# Patient Record
Sex: FEMALE | Race: WHITE | Employment: OTHER | URBAN - METROPOLITAN AREA
[De-identification: names, ages, dates, MRNs, and addresses within clinical notes are randomized per-mention and may not be internally consistent; named-entity substitution may affect disease eponyms.]

---

## 2023-01-01 ENCOUNTER — APPOINTMENT (OUTPATIENT)
Dept: GENERAL RADIOLOGY | Age: 63
End: 2023-01-01
Payer: COMMERCIAL

## 2023-01-01 ENCOUNTER — HOSPITAL ENCOUNTER (INPATIENT)
Age: 63
LOS: 6 days | Discharge: STILL ADMITTED OR ADMINISTRATIVE | End: 2023-01-20
Attending: STUDENT IN AN ORGANIZED HEALTH CARE EDUCATION/TRAINING PROGRAM | Admitting: HOSPITALIST
Payer: COMMERCIAL

## 2023-01-01 ENCOUNTER — APPOINTMENT (OUTPATIENT)
Dept: CT IMAGING | Age: 63
End: 2023-01-01
Payer: COMMERCIAL

## 2023-01-01 ENCOUNTER — HOSPITAL ENCOUNTER (INPATIENT)
Age: 63
LOS: 1 days | DRG: 951 | End: 2023-01-20
Attending: INTERNAL MEDICINE | Admitting: INTERNAL MEDICINE
Payer: COMMERCIAL

## 2023-01-01 ENCOUNTER — APPOINTMENT (OUTPATIENT)
Dept: ULTRASOUND IMAGING | Age: 63
End: 2023-01-01
Payer: COMMERCIAL

## 2023-01-01 VITALS
TEMPERATURE: 98 F | HEART RATE: 144 BPM | BODY MASS INDEX: 41.41 KG/M2 | HEIGHT: 63 IN | OXYGEN SATURATION: 92 % | DIASTOLIC BLOOD PRESSURE: 26 MMHG | RESPIRATION RATE: 34 BRPM | WEIGHT: 233.69 LBS | SYSTOLIC BLOOD PRESSURE: 82 MMHG

## 2023-01-01 VITALS
OXYGEN SATURATION: 87 % | RESPIRATION RATE: 27 BRPM | DIASTOLIC BLOOD PRESSURE: 37 MMHG | SYSTOLIC BLOOD PRESSURE: 64 MMHG | HEART RATE: 109 BPM

## 2023-01-01 DIAGNOSIS — R11.2 NAUSEA AND VOMITING, UNSPECIFIED VOMITING TYPE: Primary | ICD-10-CM

## 2023-01-01 DIAGNOSIS — C56.9 MALIGNANT NEOPLASM OF OVARY, UNSPECIFIED LATERALITY (HCC): ICD-10-CM

## 2023-01-01 LAB
ALBUMIN SERPL-MCNC: 2.2 G/DL (ref 3.4–5)
ALBUMIN SERPL-MCNC: 2.6 G/DL (ref 3.4–5)
ALP BLD-CCNC: 111 U/L (ref 40–129)
ALT SERPL-CCNC: 9 U/L (ref 10–40)
ANION GAP SERPL CALCULATED.3IONS-SCNC: 12 MMOL/L (ref 3–16)
ANION GAP SERPL CALCULATED.3IONS-SCNC: 13 MMOL/L (ref 3–16)
ANION GAP SERPL CALCULATED.3IONS-SCNC: 15 MMOL/L (ref 3–16)
ANION GAP SERPL CALCULATED.3IONS-SCNC: 15 MMOL/L (ref 3–16)
ANION GAP SERPL CALCULATED.3IONS-SCNC: 16 MMOL/L (ref 3–16)
ANISOCYTOSIS: ABNORMAL
ANISOCYTOSIS: ABNORMAL
AST SERPL-CCNC: 15 U/L (ref 15–37)
ATYPICAL LYMPHOCYTE RELATIVE PERCENT: 1 % (ref 0–6)
BACTERIA: ABNORMAL /HPF
BANDED NEUTROPHILS RELATIVE PERCENT: 10 % (ref 0–7)
BANDED NEUTROPHILS RELATIVE PERCENT: 5 % (ref 0–7)
BANDED NEUTROPHILS RELATIVE PERCENT: 7 % (ref 0–7)
BASOPHILS ABSOLUTE: 0 K/UL (ref 0–0.2)
BASOPHILS ABSOLUTE: 0.1 K/UL (ref 0–0.2)
BASOPHILS ABSOLUTE: 0.1 K/UL (ref 0–0.2)
BASOPHILS RELATIVE PERCENT: 0 %
BASOPHILS RELATIVE PERCENT: 0.1 %
BASOPHILS RELATIVE PERCENT: 0.2 %
BASOPHILS RELATIVE PERCENT: 0.3 %
BASOPHILS RELATIVE PERCENT: 0.7 %
BILIRUB SERPL-MCNC: <0.2 MG/DL (ref 0–1)
BILIRUBIN DIRECT: <0.2 MG/DL (ref 0–0.3)
BILIRUBIN URINE: ABNORMAL
BILIRUBIN, INDIRECT: ABNORMAL MG/DL (ref 0–1)
BLOOD CULTURE, ROUTINE: NORMAL
BLOOD, URINE: ABNORMAL
BUN BLDV-MCNC: 44 MG/DL (ref 7–20)
BUN BLDV-MCNC: 45 MG/DL (ref 7–20)
BUN BLDV-MCNC: 55 MG/DL (ref 7–20)
BUN BLDV-MCNC: 61 MG/DL (ref 7–20)
BUN BLDV-MCNC: 62 MG/DL (ref 7–20)
BUN BLDV-MCNC: 65 MG/DL (ref 7–20)
BUN BLDV-MCNC: 73 MG/DL (ref 7–20)
BUN BLDV-MCNC: 81 MG/DL (ref 7–20)
CALCIUM SERPL-MCNC: 6.4 MG/DL (ref 8.3–10.6)
CALCIUM SERPL-MCNC: 6.9 MG/DL (ref 8.3–10.6)
CALCIUM SERPL-MCNC: 7.3 MG/DL (ref 8.3–10.6)
CALCIUM SERPL-MCNC: 8.4 MG/DL (ref 8.3–10.6)
CALCIUM SERPL-MCNC: 8.5 MG/DL (ref 8.3–10.6)
CALCIUM SERPL-MCNC: 8.7 MG/DL (ref 8.3–10.6)
CALCIUM SERPL-MCNC: 9.2 MG/DL (ref 8.3–10.6)
CALCIUM SERPL-MCNC: 9.6 MG/DL (ref 8.3–10.6)
CHLORIDE BLD-SCNC: 108 MMOL/L (ref 99–110)
CHLORIDE BLD-SCNC: 112 MMOL/L (ref 99–110)
CHLORIDE BLD-SCNC: 113 MMOL/L (ref 99–110)
CHLORIDE BLD-SCNC: 114 MMOL/L (ref 99–110)
CHLORIDE BLD-SCNC: 91 MMOL/L (ref 99–110)
CHLORIDE BLD-SCNC: 93 MMOL/L (ref 99–110)
CHLORIDE BLD-SCNC: 99 MMOL/L (ref 99–110)
CHLORIDE BLD-SCNC: 99 MMOL/L (ref 99–110)
CLARITY: CLEAR
CO2: 16 MMOL/L (ref 21–32)
CO2: 17 MMOL/L (ref 21–32)
CO2: 19 MMOL/L (ref 21–32)
CO2: 24 MMOL/L (ref 21–32)
CO2: 27 MMOL/L (ref 21–32)
CO2: 28 MMOL/L (ref 21–32)
CO2: 29 MMOL/L (ref 21–32)
CO2: 30 MMOL/L (ref 21–32)
COLOR: YELLOW
CREAT SERPL-MCNC: 1.3 MG/DL (ref 0.6–1.2)
CREAT SERPL-MCNC: 1.4 MG/DL (ref 0.6–1.2)
CREAT SERPL-MCNC: 1.6 MG/DL (ref 0.6–1.2)
CREAT SERPL-MCNC: 2.1 MG/DL (ref 0.6–1.2)
CREAT SERPL-MCNC: 2.2 MG/DL (ref 0.6–1.2)
CREAT SERPL-MCNC: 2.2 MG/DL (ref 0.6–1.2)
CREATININE URINE: 157.2 MG/DL (ref 28–259)
CULTURE, BLOOD 2: NORMAL
EKG ATRIAL RATE: 105 BPM
EKG ATRIAL RATE: 110 BPM
EKG ATRIAL RATE: 116 BPM
EKG ATRIAL RATE: 138 BPM
EKG DIAGNOSIS: NORMAL
EKG P AXIS: 12 DEGREES
EKG P AXIS: 2 DEGREES
EKG P-R INTERVAL: 124 MS
EKG P-R INTERVAL: 126 MS
EKG Q-T INTERVAL: 284 MS
EKG Q-T INTERVAL: 288 MS
EKG Q-T INTERVAL: 334 MS
EKG Q-T INTERVAL: 362 MS
EKG QRS DURATION: 68 MS
EKG QRS DURATION: 74 MS
EKG QRS DURATION: 74 MS
EKG QRS DURATION: 84 MS
EKG QTC CALCULATION (BAZETT): 442 MS
EKG QTC CALCULATION (BAZETT): 443 MS
EKG QTC CALCULATION (BAZETT): 464 MS
EKG QTC CALCULATION (BAZETT): 478 MS
EKG R AXIS: -36 DEGREES
EKG R AXIS: -46 DEGREES
EKG R AXIS: -51 DEGREES
EKG R AXIS: 135 DEGREES
EKG T AXIS: -10 DEGREES
EKG T AXIS: 11 DEGREES
EKG T AXIS: 36 DEGREES
EKG T AXIS: 54 DEGREES
EKG VENTRICULAR RATE: 105 BPM
EKG VENTRICULAR RATE: 116 BPM
EKG VENTRICULAR RATE: 142 BPM
EKG VENTRICULAR RATE: 146 BPM
EOSINOPHILS ABSOLUTE: 0 K/UL (ref 0–0.6)
EOSINOPHILS ABSOLUTE: 0.1 K/UL (ref 0–0.6)
EOSINOPHILS ABSOLUTE: 0.2 K/UL (ref 0–0.6)
EOSINOPHILS RELATIVE PERCENT: 0 %
EOSINOPHILS RELATIVE PERCENT: 0.5 %
EOSINOPHILS RELATIVE PERCENT: 0.7 %
EOSINOPHILS RELATIVE PERCENT: 1 %
EOSINOPHILS RELATIVE PERCENT: 1.1 %
EPITHELIAL CELLS, UA: ABNORMAL /HPF (ref 0–5)
GFR SERPL CREATININE-BSD FRML MDRD: 24 ML/MIN/{1.73_M2}
GFR SERPL CREATININE-BSD FRML MDRD: 24 ML/MIN/{1.73_M2}
GFR SERPL CREATININE-BSD FRML MDRD: 26 ML/MIN/{1.73_M2}
GFR SERPL CREATININE-BSD FRML MDRD: 36 ML/MIN/{1.73_M2}
GFR SERPL CREATININE-BSD FRML MDRD: 42 ML/MIN/{1.73_M2}
GFR SERPL CREATININE-BSD FRML MDRD: 46 ML/MIN/{1.73_M2}
GLUCOSE BLD-MCNC: 100 MG/DL (ref 70–99)
GLUCOSE BLD-MCNC: 101 MG/DL (ref 70–99)
GLUCOSE BLD-MCNC: 104 MG/DL (ref 70–99)
GLUCOSE BLD-MCNC: 104 MG/DL (ref 70–99)
GLUCOSE BLD-MCNC: 111 MG/DL (ref 70–99)
GLUCOSE BLD-MCNC: 111 MG/DL (ref 70–99)
GLUCOSE BLD-MCNC: 112 MG/DL (ref 70–99)
GLUCOSE BLD-MCNC: 113 MG/DL (ref 70–99)
GLUCOSE BLD-MCNC: 122 MG/DL (ref 70–99)
GLUCOSE BLD-MCNC: 126 MG/DL (ref 70–99)
GLUCOSE BLD-MCNC: 127 MG/DL (ref 70–99)
GLUCOSE BLD-MCNC: 129 MG/DL (ref 70–99)
GLUCOSE BLD-MCNC: 142 MG/DL (ref 70–99)
GLUCOSE BLD-MCNC: 157 MG/DL (ref 70–99)
GLUCOSE BLD-MCNC: 212 MG/DL (ref 70–99)
GLUCOSE BLD-MCNC: 92 MG/DL (ref 70–99)
GLUCOSE BLD-MCNC: 93 MG/DL (ref 70–99)
GLUCOSE URINE: NEGATIVE MG/DL
HCT VFR BLD CALC: 35.4 % (ref 36–48)
HCT VFR BLD CALC: 35.7 % (ref 36–48)
HCT VFR BLD CALC: 36.1 % (ref 36–48)
HCT VFR BLD CALC: 36.6 % (ref 36–48)
HCT VFR BLD CALC: 38 % (ref 36–48)
HCT VFR BLD CALC: 38.2 % (ref 36–48)
HCT VFR BLD CALC: 40.8 % (ref 36–48)
HEMOGLOBIN: 10.2 G/DL (ref 12–16)
HEMOGLOBIN: 10.6 G/DL (ref 12–16)
HEMOGLOBIN: 10.7 G/DL (ref 12–16)
HEMOGLOBIN: 10.9 G/DL (ref 12–16)
HEMOGLOBIN: 11 G/DL (ref 12–16)
HEMOGLOBIN: 11.1 G/DL (ref 12–16)
HEMOGLOBIN: 12 G/DL (ref 12–16)
HEMOGLOBIN: 9.9 G/DL (ref 12–16)
HYALINE CASTS: ABNORMAL /LPF (ref 0–2)
INR BLD: 1.2 (ref 0.87–1.14)
KETONES, URINE: ABNORMAL MG/DL
LACTATE DEHYDROGENASE: 334 U/L (ref 100–190)
LACTIC ACID: 1.2 MMOL/L (ref 0.4–2)
LEUKOCYTE ESTERASE, URINE: ABNORMAL
LV EF: 58 %
LVEF MODALITY: NORMAL
LYMPHOCYTES ABSOLUTE: 0.6 K/UL (ref 1–5.1)
LYMPHOCYTES ABSOLUTE: 1 K/UL (ref 1–5.1)
LYMPHOCYTES ABSOLUTE: 1.1 K/UL (ref 1–5.1)
LYMPHOCYTES ABSOLUTE: 1.1 K/UL (ref 1–5.1)
LYMPHOCYTES ABSOLUTE: 1.2 K/UL (ref 1–5.1)
LYMPHOCYTES ABSOLUTE: 1.2 K/UL (ref 1–5.1)
LYMPHOCYTES ABSOLUTE: 2.1 K/UL (ref 1–5.1)
LYMPHOCYTES RELATIVE PERCENT: 12 %
LYMPHOCYTES RELATIVE PERCENT: 2.8 %
LYMPHOCYTES RELATIVE PERCENT: 4 %
LYMPHOCYTES RELATIVE PERCENT: 5 %
LYMPHOCYTES RELATIVE PERCENT: 5.5 %
LYMPHOCYTES RELATIVE PERCENT: 7.3 %
LYMPHOCYTES RELATIVE PERCENT: 9.1 %
MACROCYTES: ABNORMAL
MAGNESIUM: 1.9 MG/DL (ref 1.8–2.4)
MAGNESIUM: 2.2 MG/DL (ref 1.8–2.4)
MAGNESIUM: 2.2 MG/DL (ref 1.8–2.4)
MCH RBC QN AUTO: 20.8 PG (ref 26–34)
MCH RBC QN AUTO: 21.2 PG (ref 26–34)
MCH RBC QN AUTO: 21.2 PG (ref 26–34)
MCH RBC QN AUTO: 21.4 PG (ref 26–34)
MCH RBC QN AUTO: 21.5 PG (ref 26–34)
MCH RBC QN AUTO: 21.7 PG (ref 26–34)
MCH RBC QN AUTO: 21.8 PG (ref 26–34)
MCHC RBC AUTO-ENTMCNC: 28.1 G/DL (ref 31–36)
MCHC RBC AUTO-ENTMCNC: 28.6 G/DL (ref 31–36)
MCHC RBC AUTO-ENTMCNC: 28.9 G/DL (ref 31–36)
MCHC RBC AUTO-ENTMCNC: 29.1 G/DL (ref 31–36)
MCHC RBC AUTO-ENTMCNC: 29.5 G/DL (ref 31–36)
MCHC RBC AUTO-ENTMCNC: 29.8 G/DL (ref 31–36)
MCHC RBC AUTO-ENTMCNC: 29.8 G/DL (ref 31–36)
MCV RBC AUTO: 71.2 FL (ref 80–100)
MCV RBC AUTO: 71.8 FL (ref 80–100)
MCV RBC AUTO: 72.2 FL (ref 80–100)
MCV RBC AUTO: 72.8 FL (ref 80–100)
MCV RBC AUTO: 73.5 FL (ref 80–100)
MCV RBC AUTO: 76.3 FL (ref 80–100)
MCV RBC AUTO: 76.7 FL (ref 80–100)
METAMYELOCYTES RELATIVE PERCENT: 1 %
METAMYELOCYTES RELATIVE PERCENT: 3 %
MICROCYTES: ABNORMAL
MICROSCOPIC EXAMINATION: YES
MONOCYTES ABSOLUTE: 0.5 K/UL (ref 0–1.3)
MONOCYTES ABSOLUTE: 0.5 K/UL (ref 0–1.3)
MONOCYTES ABSOLUTE: 0.7 K/UL (ref 0–1.3)
MONOCYTES ABSOLUTE: 1.1 K/UL (ref 0–1.3)
MONOCYTES ABSOLUTE: 2.7 K/UL (ref 0–1.3)
MONOCYTES RELATIVE PERCENT: 14 %
MONOCYTES RELATIVE PERCENT: 2 %
MONOCYTES RELATIVE PERCENT: 2.4 %
MONOCYTES RELATIVE PERCENT: 4 %
MONOCYTES RELATIVE PERCENT: 5.8 %
MONOCYTES RELATIVE PERCENT: 6.5 %
MONOCYTES RELATIVE PERCENT: 8.2 %
MYELOCYTE PERCENT: 1 %
MYELOCYTE PERCENT: 2 %
NEUTROPHILS ABSOLUTE: 11.1 K/UL (ref 1.7–7.7)
NEUTROPHILS ABSOLUTE: 13.8 K/UL (ref 1.7–7.7)
NEUTROPHILS ABSOLUTE: 14.9 K/UL (ref 1.7–7.7)
NEUTROPHILS ABSOLUTE: 15 K/UL (ref 1.7–7.7)
NEUTROPHILS ABSOLUTE: 17.2 K/UL (ref 1.7–7.7)
NEUTROPHILS ABSOLUTE: 21.7 K/UL (ref 1.7–7.7)
NEUTROPHILS ABSOLUTE: 23.9 K/UL (ref 1.7–7.7)
NEUTROPHILS RELATIVE PERCENT: 70 %
NEUTROPHILS RELATIVE PERCENT: 74 %
NEUTROPHILS RELATIVE PERCENT: 81.5 %
NEUTROPHILS RELATIVE PERCENT: 84 %
NEUTROPHILS RELATIVE PERCENT: 85.2 %
NEUTROPHILS RELATIVE PERCENT: 87.5 %
NEUTROPHILS RELATIVE PERCENT: 94.6 %
NITRITE, URINE: NEGATIVE
OVALOCYTES: ABNORMAL
OVALOCYTES: ABNORMAL
PDW BLD-RTO: 28.8 % (ref 12.4–15.4)
PDW BLD-RTO: 29.3 % (ref 12.4–15.4)
PDW BLD-RTO: 30.3 % (ref 12.4–15.4)
PDW BLD-RTO: 30.3 % (ref 12.4–15.4)
PDW BLD-RTO: 30.4 % (ref 12.4–15.4)
PDW BLD-RTO: 30.6 % (ref 12.4–15.4)
PDW BLD-RTO: 31.3 % (ref 12.4–15.4)
PERFORMED ON: ABNORMAL
PERFORMED ON: NORMAL
PH UA: 5.5 (ref 5–8)
PHOSPHORUS: 3.6 MG/DL (ref 2.5–4.9)
PHOSPHORUS: 4 MG/DL (ref 2.5–4.9)
PHOSPHORUS: 4.5 MG/DL (ref 2.5–4.9)
PHOSPHORUS: 4.8 MG/DL (ref 2.5–4.9)
PLATELET # BLD: 448 K/UL (ref 135–450)
PLATELET # BLD: 452 K/UL (ref 135–450)
PLATELET # BLD: 638 K/UL (ref 135–450)
PLATELET # BLD: 666 K/UL (ref 135–450)
PLATELET # BLD: 704 K/UL (ref 135–450)
PLATELET # BLD: 739 K/UL (ref 135–450)
PLATELET # BLD: 919 K/UL (ref 135–450)
PLATELET SLIDE REVIEW: ABNORMAL
PMV BLD AUTO: 7.3 FL (ref 5–10.5)
PMV BLD AUTO: 7.6 FL (ref 5–10.5)
PMV BLD AUTO: 7.9 FL (ref 5–10.5)
PMV BLD AUTO: 8 FL (ref 5–10.5)
PMV BLD AUTO: 8.3 FL (ref 5–10.5)
POLYCHROMASIA: ABNORMAL
POTASSIUM REFLEX MAGNESIUM: 4.7 MMOL/L (ref 3.5–5.1)
POTASSIUM REFLEX MAGNESIUM: 4.8 MMOL/L (ref 3.5–5.1)
POTASSIUM SERPL-SCNC: 4.1 MMOL/L (ref 3.5–5.1)
POTASSIUM SERPL-SCNC: 4.3 MMOL/L (ref 3.5–5.1)
POTASSIUM SERPL-SCNC: 4.5 MMOL/L (ref 3.5–5.1)
POTASSIUM SERPL-SCNC: 4.6 MMOL/L (ref 3.5–5.1)
POTASSIUM SERPL-SCNC: 4.8 MMOL/L (ref 3.5–5.1)
POTASSIUM SERPL-SCNC: 6.1 MMOL/L (ref 3.5–5.1)
PRO-BNP: 238 PG/ML (ref 0–124)
PROCALCITONIN: 1.1 NG/ML (ref 0–0.15)
PROTEIN UA: 30 MG/DL
PROTHROMBIN TIME: 15.2 SEC (ref 11.7–14.5)
RBC # BLD: 4.61 M/UL (ref 4–5.2)
RBC # BLD: 4.68 M/UL (ref 4–5.2)
RBC # BLD: 4.96 M/UL (ref 4–5.2)
RBC # BLD: 5.14 M/UL (ref 4–5.2)
RBC # BLD: 5.2 M/UL (ref 4–5.2)
RBC # BLD: 5.27 M/UL (ref 4–5.2)
RBC # BLD: 5.68 M/UL (ref 4–5.2)
RBC UA: ABNORMAL /HPF (ref 0–4)
SARS-COV-2, NAAT: NOT DETECTED
SCHISTOCYTES: ABNORMAL
SLIDE REVIEW: ABNORMAL
SODIUM BLD-SCNC: 135 MMOL/L (ref 136–145)
SODIUM BLD-SCNC: 138 MMOL/L (ref 136–145)
SODIUM BLD-SCNC: 139 MMOL/L (ref 136–145)
SODIUM BLD-SCNC: 140 MMOL/L (ref 136–145)
SODIUM BLD-SCNC: 144 MMOL/L (ref 136–145)
SODIUM BLD-SCNC: 145 MMOL/L (ref 136–145)
SODIUM URINE: <20 MMOL/L
SOLUBLE TRANSFERRIN RECEPT: 3.9 MG/L (ref 1.9–4.4)
SPECIFIC GRAVITY UA: >=1.03 (ref 1–1.03)
STOMATOCYTES: ABNORMAL
TOTAL PROTEIN: 5.8 G/DL (ref 6.4–8.2)
TRIGL SERPL-MCNC: 294 MG/DL (ref 0–150)
TROPONIN: <0.01 NG/ML
URINE REFLEX TO CULTURE: ABNORMAL
URINE TYPE: ABNORMAL
UROBILINOGEN, URINE: 0.2 E.U./DL
WBC # BLD: 13.6 K/UL (ref 4–11)
WBC # BLD: 16.2 K/UL (ref 4–11)
WBC # BLD: 17.7 K/UL (ref 4–11)
WBC # BLD: 19 K/UL (ref 4–11)
WBC # BLD: 19.7 K/UL (ref 4–11)
WBC # BLD: 22.9 K/UL (ref 4–11)
WBC # BLD: 25.4 K/UL (ref 4–11)
WBC UA: ABNORMAL /HPF (ref 0–5)

## 2023-01-01 PROCEDURE — 2580000003 HC RX 258: Performed by: HOSPITALIST

## 2023-01-01 PROCEDURE — 85025 COMPLETE CBC W/AUTO DIFF WBC: CPT

## 2023-01-01 PROCEDURE — 84300 ASSAY OF URINE SODIUM: CPT

## 2023-01-01 PROCEDURE — 6360000002 HC RX W HCPCS: Performed by: NURSE PRACTITIONER

## 2023-01-01 PROCEDURE — 2580000003 HC RX 258: Performed by: STUDENT IN AN ORGANIZED HEALTH CARE EDUCATION/TRAINING PROGRAM

## 2023-01-01 PROCEDURE — 99223 1ST HOSP IP/OBS HIGH 75: CPT | Performed by: SURGERY

## 2023-01-01 PROCEDURE — 71045 X-RAY EXAM CHEST 1 VIEW: CPT

## 2023-01-01 PROCEDURE — 93010 ELECTROCARDIOGRAM REPORT: CPT | Performed by: INTERNAL MEDICINE

## 2023-01-01 PROCEDURE — 93005 ELECTROCARDIOGRAM TRACING: CPT | Performed by: INTERNAL MEDICINE

## 2023-01-01 PROCEDURE — 6360000002 HC RX W HCPCS: Performed by: HOSPITALIST

## 2023-01-01 PROCEDURE — 84484 ASSAY OF TROPONIN QUANT: CPT

## 2023-01-01 PROCEDURE — 80048 BASIC METABOLIC PNL TOTAL CA: CPT

## 2023-01-01 PROCEDURE — 83615 LACTATE (LD) (LDH) ENZYME: CPT

## 2023-01-01 PROCEDURE — 2580000003 HC RX 258

## 2023-01-01 PROCEDURE — 2500000003 HC RX 250 WO HCPCS: Performed by: INTERNAL MEDICINE

## 2023-01-01 PROCEDURE — 2580000003 HC RX 258: Performed by: INTERNAL MEDICINE

## 2023-01-01 PROCEDURE — 6360000002 HC RX W HCPCS: Performed by: INTERNAL MEDICINE

## 2023-01-01 PROCEDURE — 99223 1ST HOSP IP/OBS HIGH 75: CPT | Performed by: INTERNAL MEDICINE

## 2023-01-01 PROCEDURE — 83605 ASSAY OF LACTIC ACID: CPT

## 2023-01-01 PROCEDURE — 36415 COLL VENOUS BLD VENIPUNCTURE: CPT

## 2023-01-01 PROCEDURE — 84478 ASSAY OF TRIGLYCERIDES: CPT

## 2023-01-01 PROCEDURE — 84100 ASSAY OF PHOSPHORUS: CPT

## 2023-01-01 PROCEDURE — 2060000000 HC ICU INTERMEDIATE R&B

## 2023-01-01 PROCEDURE — 6370000000 HC RX 637 (ALT 250 FOR IP): Performed by: INTERNAL MEDICINE

## 2023-01-01 PROCEDURE — 85018 HEMOGLOBIN: CPT

## 2023-01-01 PROCEDURE — 2000000000 HC ICU R&B

## 2023-01-01 PROCEDURE — 85610 PROTHROMBIN TIME: CPT

## 2023-01-01 PROCEDURE — 74018 RADEX ABDOMEN 1 VIEW: CPT

## 2023-01-01 PROCEDURE — C9113 INJ PANTOPRAZOLE SODIUM, VIA: HCPCS | Performed by: NURSE PRACTITIONER

## 2023-01-01 PROCEDURE — 6370000000 HC RX 637 (ALT 250 FOR IP): Performed by: HOSPITALIST

## 2023-01-01 PROCEDURE — 2709999900 US GUIDED PARACENTESIS

## 2023-01-01 PROCEDURE — 96415 CHEMO IV INFUSION ADDL HR: CPT

## 2023-01-01 PROCEDURE — 74176 CT ABD & PELVIS W/O CONTRAST: CPT

## 2023-01-01 PROCEDURE — 51798 US URINE CAPACITY MEASURE: CPT

## 2023-01-01 PROCEDURE — 0W9G3ZZ DRAINAGE OF PERITONEAL CAVITY, PERCUTANEOUS APPROACH: ICD-10-PCS | Performed by: RADIOLOGY

## 2023-01-01 PROCEDURE — 6370000000 HC RX 637 (ALT 250 FOR IP): Performed by: STUDENT IN AN ORGANIZED HEALTH CARE EDUCATION/TRAINING PROGRAM

## 2023-01-01 PROCEDURE — 2500000003 HC RX 250 WO HCPCS: Performed by: STUDENT IN AN ORGANIZED HEALTH CARE EDUCATION/TRAINING PROGRAM

## 2023-01-01 PROCEDURE — 96374 THER/PROPH/DIAG INJ IV PUSH: CPT

## 2023-01-01 PROCEDURE — 96417 CHEMO IV INFUS EACH ADDL SEQ: CPT

## 2023-01-01 PROCEDURE — 6360000002 HC RX W HCPCS: Performed by: STUDENT IN AN ORGANIZED HEALTH CARE EDUCATION/TRAINING PROGRAM

## 2023-01-01 PROCEDURE — 83735 ASSAY OF MAGNESIUM: CPT

## 2023-01-01 PROCEDURE — C9113 INJ PANTOPRAZOLE SODIUM, VIA: HCPCS | Performed by: INTERNAL MEDICINE

## 2023-01-01 PROCEDURE — 84238 ASSAY NONENDOCRINE RECEPTOR: CPT

## 2023-01-01 PROCEDURE — 51702 INSERT TEMP BLADDER CATH: CPT

## 2023-01-01 PROCEDURE — 96375 TX/PRO/DX INJ NEW DRUG ADDON: CPT

## 2023-01-01 PROCEDURE — 87635 SARS-COV-2 COVID-19 AMP PRB: CPT

## 2023-01-01 PROCEDURE — 99233 SBSQ HOSP IP/OBS HIGH 50: CPT | Performed by: NURSE PRACTITIONER

## 2023-01-01 PROCEDURE — 99232 SBSQ HOSP IP/OBS MODERATE 35: CPT | Performed by: SURGERY

## 2023-01-01 PROCEDURE — 6360000004 HC RX CONTRAST MEDICATION: Performed by: HOSPITALIST

## 2023-01-01 PROCEDURE — 93306 TTE W/DOPPLER COMPLETE: CPT

## 2023-01-01 PROCEDURE — 99222 1ST HOSP IP/OBS MODERATE 55: CPT | Performed by: NURSE PRACTITIONER

## 2023-01-01 PROCEDURE — 1250000000 HC SEMI PRIVATE HOSPICE R&B

## 2023-01-01 PROCEDURE — 83880 ASSAY OF NATRIURETIC PEPTIDE: CPT

## 2023-01-01 PROCEDURE — 93005 ELECTROCARDIOGRAM TRACING: CPT

## 2023-01-01 PROCEDURE — 93005 ELECTROCARDIOGRAM TRACING: CPT | Performed by: STUDENT IN AN ORGANIZED HEALTH CARE EDUCATION/TRAINING PROGRAM

## 2023-01-01 PROCEDURE — 82570 ASSAY OF URINE CREATININE: CPT

## 2023-01-01 PROCEDURE — 87040 BLOOD CULTURE FOR BACTERIA: CPT

## 2023-01-01 PROCEDURE — 80069 RENAL FUNCTION PANEL: CPT

## 2023-01-01 PROCEDURE — 96361 HYDRATE IV INFUSION ADD-ON: CPT

## 2023-01-01 PROCEDURE — 6360000002 HC RX W HCPCS

## 2023-01-01 PROCEDURE — 84145 PROCALCITONIN (PCT): CPT

## 2023-01-01 PROCEDURE — 81001 URINALYSIS AUTO W/SCOPE: CPT

## 2023-01-01 PROCEDURE — C9113 INJ PANTOPRAZOLE SODIUM, VIA: HCPCS | Performed by: STUDENT IN AN ORGANIZED HEALTH CARE EDUCATION/TRAINING PROGRAM

## 2023-01-01 PROCEDURE — 99233 SBSQ HOSP IP/OBS HIGH 50: CPT | Performed by: INTERNAL MEDICINE

## 2023-01-01 PROCEDURE — 2580000003 HC RX 258: Performed by: NURSE PRACTITIONER

## 2023-01-01 PROCEDURE — 99285 EMERGENCY DEPT VISIT HI MDM: CPT

## 2023-01-01 PROCEDURE — 80076 HEPATIC FUNCTION PANEL: CPT

## 2023-01-01 RX ORDER — LORAZEPAM 2 MG/ML
0.5 INJECTION INTRAMUSCULAR EVERY 4 HOURS PRN
Status: DISCONTINUED | OUTPATIENT
Start: 2023-01-01 | End: 2023-01-01 | Stop reason: HOSPADM

## 2023-01-01 RX ORDER — PROMETHAZINE HYDROCHLORIDE 25 MG/ML
12.5 INJECTION, SOLUTION INTRAMUSCULAR; INTRAVENOUS EVERY 6 HOURS PRN
Status: DISCONTINUED | OUTPATIENT
Start: 2023-01-01 | End: 2023-01-01

## 2023-01-01 RX ORDER — SODIUM CHLORIDE, SODIUM LACTATE, POTASSIUM CHLORIDE, CALCIUM CHLORIDE 600; 310; 30; 20 MG/100ML; MG/100ML; MG/100ML; MG/100ML
INJECTION, SOLUTION INTRAVENOUS CONTINUOUS
Status: ACTIVE | OUTPATIENT
Start: 2023-01-01 | End: 2023-01-01

## 2023-01-01 RX ORDER — ONDANSETRON 4 MG/1
4 TABLET, ORALLY DISINTEGRATING ORAL EVERY 8 HOURS PRN
Status: ACTIVE | OUTPATIENT
Start: 2023-01-01 | End: 2023-01-01

## 2023-01-01 RX ORDER — THIAMINE HYDROCHLORIDE 100 MG/ML
100 INJECTION, SOLUTION INTRAMUSCULAR; INTRAVENOUS DAILY
Status: DISCONTINUED | OUTPATIENT
Start: 2023-01-01 | End: 2023-01-01 | Stop reason: HOSPADM

## 2023-01-01 RX ORDER — SODIUM CHLORIDE 0.9 % (FLUSH) 0.9 %
5-40 SYRINGE (ML) INJECTION EVERY 12 HOURS SCHEDULED
Status: DISCONTINUED | OUTPATIENT
Start: 2023-01-01 | End: 2023-01-21 | Stop reason: HOSPADM

## 2023-01-01 RX ORDER — SODIUM CHLORIDE, SODIUM LACTATE, POTASSIUM CHLORIDE, AND CALCIUM CHLORIDE .6; .31; .03; .02 G/100ML; G/100ML; G/100ML; G/100ML
1000 INJECTION, SOLUTION INTRAVENOUS ONCE
Status: COMPLETED | OUTPATIENT
Start: 2023-01-01 | End: 2023-01-01

## 2023-01-01 RX ORDER — POLYETHYLENE GLYCOL 3350 17 G/17G
17 POWDER, FOR SOLUTION ORAL DAILY
Status: DISCONTINUED | OUTPATIENT
Start: 2023-01-01 | End: 2023-01-01 | Stop reason: HOSPADM

## 2023-01-01 RX ORDER — 0.9 % SODIUM CHLORIDE 0.9 %
1000 INTRAVENOUS SOLUTION INTRAVENOUS ONCE
Status: COMPLETED | OUTPATIENT
Start: 2023-01-01 | End: 2023-01-01

## 2023-01-01 RX ORDER — ACETAMINOPHEN 325 MG/1
650 TABLET ORAL EVERY 4 HOURS PRN
OUTPATIENT
Start: 2023-01-01

## 2023-01-01 RX ORDER — CALCIUM CARBONATE 200(500)MG
500 TABLET,CHEWABLE ORAL ONCE
Status: DISCONTINUED | OUTPATIENT
Start: 2023-01-01 | End: 2023-01-01 | Stop reason: HOSPADM

## 2023-01-01 RX ORDER — MORPHINE SULFATE 2 MG/ML
2 INJECTION, SOLUTION INTRAMUSCULAR; INTRAVENOUS EVERY 4 HOURS PRN
Status: DISCONTINUED | OUTPATIENT
Start: 2023-01-01 | End: 2023-01-01

## 2023-01-01 RX ORDER — ACETAMINOPHEN 325 MG/1
650 TABLET ORAL EVERY 4 HOURS PRN
Status: DISCONTINUED | OUTPATIENT
Start: 2023-01-01 | End: 2023-01-21 | Stop reason: HOSPADM

## 2023-01-01 RX ORDER — LORAZEPAM 2 MG/ML
0.5 INJECTION INTRAMUSCULAR EVERY 4 HOURS PRN
OUTPATIENT
Start: 2023-01-01

## 2023-01-01 RX ORDER — MORPHINE SULFATE 20 MG/ML
7.5 SOLUTION ORAL EVERY 4 HOURS PRN
Status: DISCONTINUED | OUTPATIENT
Start: 2023-01-01 | End: 2023-01-21 | Stop reason: HOSPADM

## 2023-01-01 RX ORDER — ONDANSETRON 2 MG/ML
4 INJECTION INTRAMUSCULAR; INTRAVENOUS EVERY 6 HOURS PRN
Status: DISCONTINUED | OUTPATIENT
Start: 2023-01-01 | End: 2023-01-01

## 2023-01-01 RX ORDER — ONDANSETRON 2 MG/ML
4 INJECTION INTRAMUSCULAR; INTRAVENOUS ONCE
Status: COMPLETED | OUTPATIENT
Start: 2023-01-01 | End: 2023-01-01

## 2023-01-01 RX ORDER — MORPHINE SULFATE 20 MG/ML
15 SOLUTION ORAL EVERY 4 HOURS PRN
Status: DISCONTINUED | OUTPATIENT
Start: 2023-01-01 | End: 2023-01-21 | Stop reason: HOSPADM

## 2023-01-01 RX ORDER — SODIUM CHLORIDE 9 MG/ML
INJECTION, SOLUTION INTRAVENOUS PRN
Status: DISCONTINUED | OUTPATIENT
Start: 2023-01-01 | End: 2023-01-01 | Stop reason: HOSPADM

## 2023-01-01 RX ORDER — PROCHLORPERAZINE EDISYLATE 5 MG/ML
10 INJECTION INTRAMUSCULAR; INTRAVENOUS EVERY 6 HOURS PRN
Status: DISCONTINUED | OUTPATIENT
Start: 2023-01-01 | End: 2023-01-01 | Stop reason: HOSPADM

## 2023-01-01 RX ORDER — ONDANSETRON 4 MG/1
4 TABLET, FILM COATED ORAL EVERY 8 HOURS PRN
Status: DISCONTINUED | OUTPATIENT
Start: 2023-01-01 | End: 2023-01-01

## 2023-01-01 RX ORDER — SODIUM CHLORIDE 9 MG/ML
INJECTION, SOLUTION INTRAVENOUS CONTINUOUS
Status: DISCONTINUED | OUTPATIENT
Start: 2023-01-01 | End: 2023-01-01

## 2023-01-01 RX ORDER — METRONIDAZOLE 500 MG/100ML
500 INJECTION, SOLUTION INTRAVENOUS EVERY 8 HOURS
Status: DISCONTINUED | OUTPATIENT
Start: 2023-01-01 | End: 2023-01-01 | Stop reason: HOSPADM

## 2023-01-01 RX ORDER — ACETAMINOPHEN 500 MG
1000 TABLET ORAL ONCE
Status: COMPLETED | OUTPATIENT
Start: 2023-01-01 | End: 2023-01-01

## 2023-01-01 RX ORDER — PANTOPRAZOLE SODIUM 40 MG/10ML
40 INJECTION, POWDER, LYOPHILIZED, FOR SOLUTION INTRAVENOUS DAILY
Status: DISCONTINUED | OUTPATIENT
Start: 2023-01-01 | End: 2023-01-01

## 2023-01-01 RX ORDER — DIPHENHYDRAMINE HYDROCHLORIDE 50 MG/ML
50 INJECTION INTRAMUSCULAR; INTRAVENOUS ONCE
Status: COMPLETED | OUTPATIENT
Start: 2023-01-01 | End: 2023-01-01

## 2023-01-01 RX ORDER — SODIUM CHLORIDE 0.9 % (FLUSH) 0.9 %
5-40 SYRINGE (ML) INJECTION EVERY 12 HOURS SCHEDULED
Status: DISCONTINUED | OUTPATIENT
Start: 2023-01-01 | End: 2023-01-01 | Stop reason: HOSPADM

## 2023-01-01 RX ORDER — SODIUM CHLORIDE 9 MG/ML
INJECTION, SOLUTION INTRAVENOUS PRN
OUTPATIENT
Start: 2023-01-01

## 2023-01-01 RX ORDER — ACETAMINOPHEN 500 MG
1000 TABLET ORAL ONCE
Status: DISCONTINUED | OUTPATIENT
Start: 2023-01-01 | End: 2023-01-01 | Stop reason: HOSPADM

## 2023-01-01 RX ORDER — DEXTROSE MONOHYDRATE 100 MG/ML
INJECTION, SOLUTION INTRAVENOUS CONTINUOUS PRN
Status: DISCONTINUED | OUTPATIENT
Start: 2023-01-01 | End: 2023-01-01 | Stop reason: HOSPADM

## 2023-01-01 RX ORDER — ACETAMINOPHEN 325 MG/1
650 TABLET ORAL EVERY 6 HOURS PRN
OUTPATIENT
Start: 2023-01-01

## 2023-01-01 RX ORDER — OXYCODONE HYDROCHLORIDE 5 MG/1
5 TABLET ORAL EVERY 4 HOURS PRN
Status: DISCONTINUED | OUTPATIENT
Start: 2023-01-01 | End: 2023-01-01 | Stop reason: HOSPADM

## 2023-01-01 RX ORDER — LORAZEPAM 1 MG/1
1 TABLET ORAL EVERY 4 HOURS PRN
Status: DISCONTINUED | OUTPATIENT
Start: 2023-01-01 | End: 2023-01-21 | Stop reason: HOSPADM

## 2023-01-01 RX ORDER — ENOXAPARIN SODIUM 100 MG/ML
40 INJECTION SUBCUTANEOUS DAILY
Status: DISCONTINUED | OUTPATIENT
Start: 2023-01-01 | End: 2023-01-01 | Stop reason: HOSPADM

## 2023-01-01 RX ORDER — GLUCAGON 1 MG/ML
1 KIT INJECTION PRN
Status: DISCONTINUED | OUTPATIENT
Start: 2023-01-01 | End: 2023-01-01 | Stop reason: HOSPADM

## 2023-01-01 RX ORDER — SODIUM CHLORIDE 0.9 % (FLUSH) 0.9 %
5-40 SYRINGE (ML) INJECTION EVERY 12 HOURS SCHEDULED
OUTPATIENT
Start: 2023-01-01

## 2023-01-01 RX ORDER — SODIUM CHLORIDE 0.9 % (FLUSH) 0.9 %
5-40 SYRINGE (ML) INJECTION PRN
Status: DISCONTINUED | OUTPATIENT
Start: 2023-01-01 | End: 2023-01-01 | Stop reason: HOSPADM

## 2023-01-01 RX ORDER — ACETAMINOPHEN 650 MG/1
650 SUPPOSITORY RECTAL EVERY 6 HOURS PRN
OUTPATIENT
Start: 2023-01-01

## 2023-01-01 RX ORDER — SODIUM CHLORIDE 0.9 % (FLUSH) 0.9 %
5-40 SYRINGE (ML) INJECTION PRN
Status: DISCONTINUED | OUTPATIENT
Start: 2023-01-01 | End: 2023-01-21 | Stop reason: HOSPADM

## 2023-01-01 RX ORDER — 0.9 % SODIUM CHLORIDE 0.9 %
500 INTRAVENOUS SOLUTION INTRAVENOUS ONCE
Status: CANCELLED | OUTPATIENT
Start: 2023-01-01 | End: 2023-01-01

## 2023-01-01 RX ORDER — MORPHINE SULFATE 10 MG/5ML
4 SOLUTION ORAL
OUTPATIENT
Start: 2023-01-01

## 2023-01-01 RX ORDER — POLYVINYL ALCOHOL 14 MG/ML
1 SOLUTION/ DROPS OPHTHALMIC
Status: DISCONTINUED | OUTPATIENT
Start: 2023-01-01 | End: 2023-01-21 | Stop reason: HOSPADM

## 2023-01-01 RX ORDER — POLYETHYLENE GLYCOL 3350 17 G/17G
17 POWDER, FOR SOLUTION ORAL DAILY PRN
OUTPATIENT
Start: 2023-01-01

## 2023-01-01 RX ORDER — POLYETHYLENE GLYCOL 3350 17 G/17G
17 POWDER, FOR SOLUTION ORAL DAILY PRN
Status: DISCONTINUED | OUTPATIENT
Start: 2023-01-01 | End: 2023-01-01 | Stop reason: HOSPADM

## 2023-01-01 RX ORDER — LEVOFLOXACIN 5 MG/ML
500 INJECTION, SOLUTION INTRAVENOUS
Status: COMPLETED | OUTPATIENT
Start: 2023-01-01 | End: 2023-01-01

## 2023-01-01 RX ORDER — SCOLOPAMINE TRANSDERMAL SYSTEM 1 MG/1
1 PATCH, EXTENDED RELEASE TRANSDERMAL
OUTPATIENT
Start: 2023-01-01

## 2023-01-01 RX ORDER — SODIUM CHLORIDE 0.9 % (FLUSH) 0.9 %
5-40 SYRINGE (ML) INJECTION PRN
OUTPATIENT
Start: 2023-01-01

## 2023-01-01 RX ORDER — ACETAMINOPHEN 325 MG/1
650 TABLET ORAL EVERY 6 HOURS PRN
Status: DISCONTINUED | OUTPATIENT
Start: 2023-01-01 | End: 2023-01-01 | Stop reason: HOSPADM

## 2023-01-01 RX ORDER — PANTOPRAZOLE SODIUM 40 MG/10ML
40 INJECTION, POWDER, LYOPHILIZED, FOR SOLUTION INTRAVENOUS DAILY
Status: DISCONTINUED | OUTPATIENT
Start: 2023-01-01 | End: 2023-01-01 | Stop reason: HOSPADM

## 2023-01-01 RX ORDER — ONDANSETRON HYDROCHLORIDE 8 MG/1
24 TABLET, FILM COATED ORAL ONCE
Status: COMPLETED | OUTPATIENT
Start: 2023-01-01 | End: 2023-01-01

## 2023-01-01 RX ORDER — ONDANSETRON 2 MG/ML
4 INJECTION INTRAMUSCULAR; INTRAVENOUS EVERY 6 HOURS PRN
Status: DISPENSED | OUTPATIENT
Start: 2023-01-01 | End: 2023-01-01

## 2023-01-01 RX ORDER — ACETAMINOPHEN 650 MG/1
650 SUPPOSITORY RECTAL EVERY 6 HOURS PRN
Status: DISCONTINUED | OUTPATIENT
Start: 2023-01-01 | End: 2023-01-01 | Stop reason: HOSPADM

## 2023-01-01 RX ORDER — CALCIUM GLUCONATE 20 MG/ML
2000 INJECTION, SOLUTION INTRAVENOUS ONCE
Status: COMPLETED | OUTPATIENT
Start: 2023-01-01 | End: 2023-01-01

## 2023-01-01 RX ORDER — SODIUM CHLORIDE 9 MG/ML
INJECTION, SOLUTION INTRAVENOUS PRN
Status: DISCONTINUED | OUTPATIENT
Start: 2023-01-01 | End: 2023-01-21 | Stop reason: HOSPADM

## 2023-01-01 RX ADMIN — ONDANSETRON 4 MG: 2 INJECTION INTRAMUSCULAR; INTRAVENOUS at 12:07

## 2023-01-01 RX ADMIN — BISACODYL 5 MG: 5 TABLET, COATED ORAL at 15:18

## 2023-01-01 RX ADMIN — PANTOPRAZOLE SODIUM 40 MG: 40 INJECTION, POWDER, LYOPHILIZED, FOR SOLUTION INTRAVENOUS at 08:35

## 2023-01-01 RX ADMIN — PROMETHAZINE HYDROCHLORIDE 12.5 MG: 25 INJECTION INTRAMUSCULAR; INTRAVENOUS at 07:58

## 2023-01-01 RX ADMIN — ONDANSETRON 4 MG: 2 INJECTION INTRAMUSCULAR; INTRAVENOUS at 14:24

## 2023-01-01 RX ADMIN — SODIUM CHLORIDE, PRESERVATIVE FREE 5 ML: 5 INJECTION INTRAVENOUS at 22:05

## 2023-01-01 RX ADMIN — METRONIDAZOLE 500 MG: 500 INJECTION, SOLUTION INTRAVENOUS at 23:44

## 2023-01-01 RX ADMIN — ONDANSETRON 4 MG: 2 INJECTION INTRAMUSCULAR; INTRAVENOUS at 17:55

## 2023-01-01 RX ADMIN — SODIUM CHLORIDE: 9 INJECTION, SOLUTION INTRAVENOUS at 05:21

## 2023-01-01 RX ADMIN — SODIUM CHLORIDE, POTASSIUM CHLORIDE, SODIUM LACTATE AND CALCIUM CHLORIDE: 600; 310; 30; 20 INJECTION, SOLUTION INTRAVENOUS at 11:11

## 2023-01-01 RX ADMIN — ONDANSETRON 4 MG: 2 INJECTION INTRAMUSCULAR; INTRAVENOUS at 23:46

## 2023-01-01 RX ADMIN — SODIUM CHLORIDE 360 MG: 9 INJECTION, SOLUTION INTRAVENOUS at 16:24

## 2023-01-01 RX ADMIN — SODIUM CHLORIDE: 9 INJECTION, SOLUTION INTRAVENOUS at 16:12

## 2023-01-01 RX ADMIN — SODIUM CHLORIDE, PRESERVATIVE FREE 10 ML: 5 INJECTION INTRAVENOUS at 21:04

## 2023-01-01 RX ADMIN — SODIUM CHLORIDE: 9 INJECTION, SOLUTION INTRAVENOUS at 23:50

## 2023-01-01 RX ADMIN — NOREPINEPHRINE BITARTRATE 5 MCG/MIN: 1 SOLUTION INTRAVENOUS at 05:58

## 2023-01-01 RX ADMIN — PANTOPRAZOLE SODIUM 40 MG: 40 INJECTION, POWDER, LYOPHILIZED, FOR SOLUTION INTRAVENOUS at 17:48

## 2023-01-01 RX ADMIN — METRONIDAZOLE 500 MG: 500 INJECTION, SOLUTION INTRAVENOUS at 17:40

## 2023-01-01 RX ADMIN — PANTOPRAZOLE SODIUM 8 MG/HR: 40 INJECTION, POWDER, LYOPHILIZED, FOR SOLUTION INTRAVENOUS at 09:05

## 2023-01-01 RX ADMIN — SODIUM CHLORIDE: 9 INJECTION, SOLUTION INTRAVENOUS at 20:59

## 2023-01-01 RX ADMIN — HYDROMORPHONE HYDROCHLORIDE 1 MG: 1 INJECTION, SOLUTION INTRAMUSCULAR; INTRAVENOUS; SUBCUTANEOUS at 13:20

## 2023-01-01 RX ADMIN — ENOXAPARIN SODIUM 40 MG: 100 INJECTION SUBCUTANEOUS at 17:59

## 2023-01-01 RX ADMIN — ONDANSETRON 4 MG: 2 INJECTION INTRAMUSCULAR; INTRAVENOUS at 03:49

## 2023-01-01 RX ADMIN — SODIUM CHLORIDE, PRESERVATIVE FREE 10 ML: 5 INJECTION INTRAVENOUS at 08:40

## 2023-01-01 RX ADMIN — PANTOPRAZOLE SODIUM 40 MG: 40 INJECTION, POWDER, LYOPHILIZED, FOR SOLUTION INTRAVENOUS at 08:55

## 2023-01-01 RX ADMIN — SODIUM CHLORIDE, PRESERVATIVE FREE 10 ML: 5 INJECTION INTRAVENOUS at 09:10

## 2023-01-01 RX ADMIN — PROMETHAZINE HYDROCHLORIDE 12.5 MG: 25 INJECTION INTRAMUSCULAR; INTRAVENOUS at 00:58

## 2023-01-01 RX ADMIN — HYDROMORPHONE HYDROCHLORIDE 1 MG: 1 INJECTION, SOLUTION INTRAMUSCULAR; INTRAVENOUS; SUBCUTANEOUS at 21:41

## 2023-01-01 RX ADMIN — LEVOFLOXACIN 500 MG: 5 INJECTION, SOLUTION INTRAVENOUS at 18:00

## 2023-01-01 RX ADMIN — HYDROMORPHONE HYDROCHLORIDE 1 MG: 1 INJECTION, SOLUTION INTRAMUSCULAR; INTRAVENOUS; SUBCUTANEOUS at 06:50

## 2023-01-01 RX ADMIN — ACETAMINOPHEN 1000 MG: 500 TABLET ORAL at 21:38

## 2023-01-01 RX ADMIN — IRON SUCROSE 300 MG: 20 INJECTION, SOLUTION INTRAVENOUS at 10:00

## 2023-01-01 RX ADMIN — CARBOPLATIN 400 MG: 10 INJECTION, SOLUTION INTRAVENOUS at 20:55

## 2023-01-01 RX ADMIN — MEROPENEM 1000 MG: 1 INJECTION INTRAVENOUS at 03:12

## 2023-01-01 RX ADMIN — ONDANSETRON 4 MG: 2 INJECTION INTRAMUSCULAR; INTRAVENOUS at 21:30

## 2023-01-01 RX ADMIN — ONDANSETRON 4 MG: 2 INJECTION INTRAMUSCULAR; INTRAVENOUS at 20:24

## 2023-01-01 RX ADMIN — ENOXAPARIN SODIUM 40 MG: 100 INJECTION SUBCUTANEOUS at 17:26

## 2023-01-01 RX ADMIN — HYDROMORPHONE HYDROCHLORIDE 0.5 MG: 1 INJECTION, SOLUTION INTRAMUSCULAR; INTRAVENOUS; SUBCUTANEOUS at 12:07

## 2023-01-01 RX ADMIN — HYDROMORPHONE HYDROCHLORIDE 1 MG: 1 INJECTION, SOLUTION INTRAMUSCULAR; INTRAVENOUS; SUBCUTANEOUS at 20:04

## 2023-01-01 RX ADMIN — METRONIDAZOLE 500 MG: 500 INJECTION, SOLUTION INTRAVENOUS at 08:34

## 2023-01-01 RX ADMIN — MORPHINE SULFATE 2 MG: 2 INJECTION, SOLUTION INTRAMUSCULAR; INTRAVENOUS at 00:34

## 2023-01-01 RX ADMIN — PROMETHAZINE HYDROCHLORIDE 12.5 MG: 25 INJECTION INTRAMUSCULAR; INTRAVENOUS at 10:14

## 2023-01-01 RX ADMIN — THIAMINE HYDROCHLORIDE 100 MG: 100 INJECTION, SOLUTION INTRAMUSCULAR; INTRAVENOUS at 08:41

## 2023-01-01 RX ADMIN — SODIUM CHLORIDE: 9 INJECTION, SOLUTION INTRAVENOUS at 08:08

## 2023-01-01 RX ADMIN — HYDROMORPHONE HYDROCHLORIDE 1 MG: 1 INJECTION, SOLUTION INTRAMUSCULAR; INTRAVENOUS; SUBCUTANEOUS at 03:28

## 2023-01-01 RX ADMIN — SODIUM CHLORIDE: 9 INJECTION, SOLUTION INTRAVENOUS at 17:59

## 2023-01-01 RX ADMIN — PROMETHAZINE HYDROCHLORIDE 12.5 MG: 25 INJECTION INTRAMUSCULAR; INTRAVENOUS at 14:40

## 2023-01-01 RX ADMIN — SODIUM CHLORIDE: 9 INJECTION, SOLUTION INTRAVENOUS at 08:43

## 2023-01-01 RX ADMIN — MORPHINE SULFATE 2 MG: 2 INJECTION, SOLUTION INTRAMUSCULAR; INTRAVENOUS at 12:38

## 2023-01-01 RX ADMIN — THIAMINE HYDROCHLORIDE 100 MG: 100 INJECTION, SOLUTION INTRAMUSCULAR; INTRAVENOUS at 10:22

## 2023-01-01 RX ADMIN — ONDANSETRON 4 MG: 2 INJECTION INTRAMUSCULAR; INTRAVENOUS at 10:38

## 2023-01-01 RX ADMIN — SODIUM CHLORIDE, PRESERVATIVE FREE 10 ML: 5 INJECTION INTRAVENOUS at 20:05

## 2023-01-01 RX ADMIN — DEXTROSE MONOHYDRATE 250 ML: 100 INJECTION, SOLUTION INTRAVENOUS at 10:19

## 2023-01-01 RX ADMIN — SODIUM CHLORIDE, PRESERVATIVE FREE 20 MG: 5 INJECTION INTRAVENOUS at 15:18

## 2023-01-01 RX ADMIN — SODIUM CHLORIDE, PRESERVATIVE FREE 10 ML: 5 INJECTION INTRAVENOUS at 08:56

## 2023-01-01 RX ADMIN — SODIUM BICARBONATE: 84 INJECTION, SOLUTION INTRAVENOUS at 22:16

## 2023-01-01 RX ADMIN — THIAMINE HYDROCHLORIDE 100 MG: 100 INJECTION, SOLUTION INTRAMUSCULAR; INTRAVENOUS at 11:29

## 2023-01-01 RX ADMIN — PROMETHAZINE HYDROCHLORIDE 12.5 MG: 25 INJECTION INTRAMUSCULAR; INTRAVENOUS at 17:30

## 2023-01-01 RX ADMIN — MORPHINE SULFATE 2 MG: 2 INJECTION, SOLUTION INTRAMUSCULAR; INTRAVENOUS at 07:57

## 2023-01-01 RX ADMIN — MORPHINE SULFATE 2 MG: 2 INJECTION, SOLUTION INTRAMUSCULAR; INTRAVENOUS at 08:40

## 2023-01-01 RX ADMIN — ONDANSETRON 4 MG: 2 INJECTION INTRAMUSCULAR; INTRAVENOUS at 15:29

## 2023-01-01 RX ADMIN — SODIUM BICARBONATE: 84 INJECTION, SOLUTION INTRAVENOUS at 13:07

## 2023-01-01 RX ADMIN — POLYETHYLENE GLYCOL 3350 17 G: 17 POWDER, FOR SOLUTION ORAL at 08:55

## 2023-01-01 RX ADMIN — HYDROMORPHONE HYDROCHLORIDE 1 MG: 1 INJECTION, SOLUTION INTRAMUSCULAR; INTRAVENOUS; SUBCUTANEOUS at 09:55

## 2023-01-01 RX ADMIN — MORPHINE SULFATE 2 MG: 2 INJECTION, SOLUTION INTRAMUSCULAR; INTRAVENOUS at 17:04

## 2023-01-01 RX ADMIN — MORPHINE SULFATE 2 MG: 2 INJECTION, SOLUTION INTRAMUSCULAR; INTRAVENOUS at 03:49

## 2023-01-01 RX ADMIN — PROMETHAZINE HYDROCHLORIDE 12.5 MG: 25 INJECTION INTRAMUSCULAR; INTRAVENOUS at 12:36

## 2023-01-01 RX ADMIN — PROMETHAZINE HYDROCHLORIDE 12.5 MG: 25 INJECTION INTRAMUSCULAR; INTRAVENOUS at 21:42

## 2023-01-01 RX ADMIN — DEXAMETHASONE SODIUM PHOSPHATE 12 MG: 4 INJECTION, SOLUTION INTRAMUSCULAR; INTRAVENOUS at 15:29

## 2023-01-01 RX ADMIN — FOSAPREPITANT DIMEGLUMINE 150 MG: 150 INJECTION, POWDER, LYOPHILIZED, FOR SOLUTION INTRAVENOUS at 15:26

## 2023-01-01 RX ADMIN — ACETAMINOPHEN 650 MG: 650 SUPPOSITORY RECTAL at 00:10

## 2023-01-01 RX ADMIN — HYDROMORPHONE HYDROCHLORIDE 1 MG: 1 INJECTION, SOLUTION INTRAMUSCULAR; INTRAVENOUS; SUBCUTANEOUS at 03:09

## 2023-01-01 RX ADMIN — OXYCODONE HYDROCHLORIDE 5 MG: 5 TABLET ORAL at 11:28

## 2023-01-01 RX ADMIN — PROMETHAZINE HYDROCHLORIDE 12.5 MG: 25 INJECTION INTRAMUSCULAR; INTRAVENOUS at 16:32

## 2023-01-01 RX ADMIN — ONDANSETRON HYDROCHLORIDE 24 MG: 8 TABLET, FILM COATED ORAL at 15:18

## 2023-01-01 RX ADMIN — ENOXAPARIN SODIUM 40 MG: 100 INJECTION SUBCUTANEOUS at 17:58

## 2023-01-01 RX ADMIN — SODIUM CHLORIDE 1000 ML: 0.9 INJECTION, SOLUTION INTRAVENOUS at 04:57

## 2023-01-01 RX ADMIN — MORPHINE SULFATE 2 MG: 2 INJECTION, SOLUTION INTRAMUSCULAR; INTRAVENOUS at 08:39

## 2023-01-01 RX ADMIN — ONDANSETRON 4 MG: 2 INJECTION INTRAMUSCULAR; INTRAVENOUS at 08:33

## 2023-01-01 RX ADMIN — PANTOPRAZOLE SODIUM 8 MG/HR: 40 INJECTION, POWDER, LYOPHILIZED, FOR SOLUTION INTRAVENOUS at 23:18

## 2023-01-01 RX ADMIN — HYDROMORPHONE HYDROCHLORIDE 1 MG: 1 INJECTION, SOLUTION INTRAMUSCULAR; INTRAVENOUS; SUBCUTANEOUS at 05:54

## 2023-01-01 RX ADMIN — MORPHINE SULFATE 2 MG: 2 INJECTION, SOLUTION INTRAMUSCULAR; INTRAVENOUS at 20:24

## 2023-01-01 RX ADMIN — MORPHINE SULFATE 2 MG: 2 INJECTION, SOLUTION INTRAMUSCULAR; INTRAVENOUS at 14:01

## 2023-01-01 RX ADMIN — MEROPENEM 1000 MG: 1 INJECTION INTRAVENOUS at 17:44

## 2023-01-01 RX ADMIN — CALCIUM GLUCONATE 2000 MG: 20 INJECTION, SOLUTION INTRAVENOUS at 05:31

## 2023-01-01 RX ADMIN — ENOXAPARIN SODIUM 40 MG: 100 INJECTION SUBCUTANEOUS at 17:34

## 2023-01-01 RX ADMIN — ENOXAPARIN SODIUM 40 MG: 100 INJECTION SUBCUTANEOUS at 16:15

## 2023-01-01 RX ADMIN — SODIUM CHLORIDE: 9 INJECTION, SOLUTION INTRAVENOUS at 01:20

## 2023-01-01 RX ADMIN — SODIUM CHLORIDE, PRESERVATIVE FREE 10 ML: 5 INJECTION INTRAVENOUS at 08:04

## 2023-01-01 RX ADMIN — LORAZEPAM 0.5 MG: 2 INJECTION INTRAMUSCULAR at 15:43

## 2023-01-01 RX ADMIN — ONDANSETRON 4 MG: 2 INJECTION INTRAMUSCULAR; INTRAVENOUS at 08:30

## 2023-01-01 RX ADMIN — HYDROMORPHONE HYDROCHLORIDE 1 MG: 1 INJECTION, SOLUTION INTRAMUSCULAR; INTRAVENOUS; SUBCUTANEOUS at 18:18

## 2023-01-01 RX ADMIN — MORPHINE SULFATE 2 MG: 2 INJECTION, SOLUTION INTRAMUSCULAR; INTRAVENOUS at 03:45

## 2023-01-01 RX ADMIN — INSULIN HUMAN 10 UNITS: 100 INJECTION, SOLUTION PARENTERAL at 10:24

## 2023-01-01 RX ADMIN — MORPHINE SULFATE 2 MG: 2 INJECTION, SOLUTION INTRAMUSCULAR; INTRAVENOUS at 21:04

## 2023-01-01 RX ADMIN — SODIUM CHLORIDE: 9 INJECTION, SOLUTION INTRAVENOUS at 16:02

## 2023-01-01 RX ADMIN — IOHEXOL 50 ML: 240 INJECTION, SOLUTION INTRATHECAL; INTRAVASCULAR; INTRAVENOUS; ORAL at 16:36

## 2023-01-01 RX ADMIN — HYDROMORPHONE HYDROCHLORIDE 1 MG: 1 INJECTION, SOLUTION INTRAMUSCULAR; INTRAVENOUS; SUBCUTANEOUS at 13:46

## 2023-01-01 RX ADMIN — SODIUM CHLORIDE, PRESERVATIVE FREE 10 ML: 5 INJECTION INTRAVENOUS at 08:42

## 2023-01-01 RX ADMIN — SODIUM CHLORIDE, PRESERVATIVE FREE 10 ML: 5 INJECTION INTRAVENOUS at 08:44

## 2023-01-01 RX ADMIN — ACETAMINOPHEN 650 MG: 650 SUPPOSITORY RECTAL at 08:43

## 2023-01-01 RX ADMIN — MORPHINE SULFATE 2 MG: 2 INJECTION, SOLUTION INTRAMUSCULAR; INTRAVENOUS at 21:42

## 2023-01-01 RX ADMIN — PROCHLORPERAZINE EDISYLATE 10 MG: 5 INJECTION, SOLUTION INTRAMUSCULAR; INTRAVENOUS at 10:21

## 2023-01-01 RX ADMIN — LEVOFLOXACIN 500 MG: 5 INJECTION, SOLUTION INTRAVENOUS at 16:14

## 2023-01-01 RX ADMIN — FILGRASTIM-AAFI 480 MCG: 480 INJECTION, SOLUTION SUBCUTANEOUS at 08:44

## 2023-01-01 RX ADMIN — SODIUM BICARBONATE: 84 INJECTION, SOLUTION INTRAVENOUS at 06:54

## 2023-01-01 RX ADMIN — HYDROMORPHONE HYDROCHLORIDE 1 MG: 1 INJECTION, SOLUTION INTRAMUSCULAR; INTRAVENOUS; SUBCUTANEOUS at 12:43

## 2023-01-01 RX ADMIN — PANTOPRAZOLE SODIUM 40 MG: 40 INJECTION, POWDER, LYOPHILIZED, FOR SOLUTION INTRAVENOUS at 10:42

## 2023-01-01 RX ADMIN — LEVOFLOXACIN 500 MG: 5 INJECTION, SOLUTION INTRAVENOUS at 18:05

## 2023-01-01 RX ADMIN — SODIUM CHLORIDE: 9 INJECTION, SOLUTION INTRAVENOUS at 01:00

## 2023-01-01 RX ADMIN — SODIUM CHLORIDE, PRESERVATIVE FREE 10 ML: 5 INJECTION INTRAVENOUS at 20:25

## 2023-01-01 RX ADMIN — DIPHENHYDRAMINE HYDROCHLORIDE 50 MG: 50 INJECTION, SOLUTION INTRAMUSCULAR; INTRAVENOUS at 15:18

## 2023-01-01 RX ADMIN — HYDROMORPHONE HYDROCHLORIDE 1 MG: 1 INJECTION, SOLUTION INTRAMUSCULAR; INTRAVENOUS; SUBCUTANEOUS at 23:35

## 2023-01-01 RX ADMIN — SODIUM CHLORIDE: 9 INJECTION, SOLUTION INTRAVENOUS at 10:14

## 2023-01-01 RX ADMIN — SODIUM CHLORIDE, PRESERVATIVE FREE 10 ML: 5 INJECTION INTRAVENOUS at 21:44

## 2023-01-01 RX ADMIN — SODIUM CHLORIDE, POTASSIUM CHLORIDE, SODIUM LACTATE AND CALCIUM CHLORIDE 1000 ML: 600; 310; 30; 20 INJECTION, SOLUTION INTRAVENOUS at 12:08

## 2023-01-01 ASSESSMENT — PAIN DESCRIPTION - ORIENTATION
ORIENTATION: ANTERIOR
ORIENTATION: INNER
ORIENTATION: INNER
ORIENTATION: MID;INNER
ORIENTATION: ANTERIOR
ORIENTATION: MID
ORIENTATION: INNER
ORIENTATION: MID
ORIENTATION: INNER
ORIENTATION: INNER
ORIENTATION: ANTERIOR
ORIENTATION: MID
ORIENTATION: INNER
ORIENTATION: INNER
ORIENTATION: MID;INNER
ORIENTATION: MID
ORIENTATION: LOWER
ORIENTATION: MID
ORIENTATION: INNER
ORIENTATION: MID

## 2023-01-01 ASSESSMENT — PAIN DESCRIPTION - LOCATION
LOCATION: ABDOMEN
LOCATION: GENERALIZED;ABDOMEN
LOCATION: ABDOMEN;CHEST
LOCATION: ABDOMEN
LOCATION: ABDOMEN

## 2023-01-01 ASSESSMENT — PAIN DESCRIPTION - ONSET
ONSET: ON-GOING
ONSET: PROGRESSIVE
ONSET: ON-GOING
ONSET: ON-GOING
ONSET: PROGRESSIVE
ONSET: ON-GOING

## 2023-01-01 ASSESSMENT — PAIN DESCRIPTION - DESCRIPTORS
DESCRIPTORS: ACHING;SORE;SHARP
DESCRIPTORS: ACHING;SORE
DESCRIPTORS: SORE
DESCRIPTORS: ACHING
DESCRIPTORS: ACHING;SORE;TIGHTNESS
DESCRIPTORS: ACHING
DESCRIPTORS: ACHING;OTHER (COMMENT)
DESCRIPTORS: TIGHTNESS
DESCRIPTORS: PRESSURE;BURNING;CRAMPING
DESCRIPTORS: PRESSURE;OTHER (COMMENT)
DESCRIPTORS: ACHING;SORE
DESCRIPTORS: ACHING;SORE
DESCRIPTORS: ACHING;CRAMPING
DESCRIPTORS: ACHING
DESCRIPTORS: ACHING;DISCOMFORT;TIGHTNESS
DESCRIPTORS: ACHING;SORE
DESCRIPTORS: ACHING;SORE
DESCRIPTORS: ACHING
DESCRIPTORS: ACHING;SORE
DESCRIPTORS: TIGHTNESS
DESCRIPTORS: CRAMPING;DISCOMFORT;TIGHTNESS
DESCRIPTORS: TIGHTNESS
DESCRIPTORS: ACHING;SORE
DESCRIPTORS: ACHING
DESCRIPTORS: PRESSURE;SQUEEZING
DESCRIPTORS: TIGHTNESS
DESCRIPTORS: ACHING;CRAMPING
DESCRIPTORS: DISCOMFORT;TIGHTNESS
DESCRIPTORS: TIGHTNESS
DESCRIPTORS: ACHING
DESCRIPTORS: ACHING;SORE;TIGHTNESS
DESCRIPTORS: ACHING;BURNING
DESCRIPTORS: TIGHTNESS

## 2023-01-01 ASSESSMENT — PAIN SCALES - GENERAL
PAINLEVEL_OUTOF10: 4
PAINLEVEL_OUTOF10: 5
PAINLEVEL_OUTOF10: 3
PAINLEVEL_OUTOF10: 7
PAINLEVEL_OUTOF10: 7
PAINLEVEL_OUTOF10: 6
PAINLEVEL_OUTOF10: 10
PAINLEVEL_OUTOF10: 4
PAINLEVEL_OUTOF10: 8
PAINLEVEL_OUTOF10: 5
PAINLEVEL_OUTOF10: 7
PAINLEVEL_OUTOF10: 4
PAINLEVEL_OUTOF10: 6
PAINLEVEL_OUTOF10: 9
PAINLEVEL_OUTOF10: 8
PAINLEVEL_OUTOF10: 7
PAINLEVEL_OUTOF10: 2
PAINLEVEL_OUTOF10: 8
PAINLEVEL_OUTOF10: 6
PAINLEVEL_OUTOF10: 2
PAINLEVEL_OUTOF10: 2
PAINLEVEL_OUTOF10: 7
PAINLEVEL_OUTOF10: 7
PAINLEVEL_OUTOF10: 2
PAINLEVEL_OUTOF10: 9
PAINLEVEL_OUTOF10: 7
PAINLEVEL_OUTOF10: 8
PAINLEVEL_OUTOF10: 1
PAINLEVEL_OUTOF10: 8
PAINLEVEL_OUTOF10: 7
PAINLEVEL_OUTOF10: 10
PAINLEVEL_OUTOF10: 6
PAINLEVEL_OUTOF10: 7
PAINLEVEL_OUTOF10: 2
PAINLEVEL_OUTOF10: 6
PAINLEVEL_OUTOF10: 3
PAINLEVEL_OUTOF10: 2
PAINLEVEL_OUTOF10: 8
PAINLEVEL_OUTOF10: 7
PAINLEVEL_OUTOF10: 0
PAINLEVEL_OUTOF10: 7
PAINLEVEL_OUTOF10: 0
PAINLEVEL_OUTOF10: 2
PAINLEVEL_OUTOF10: 8
PAINLEVEL_OUTOF10: 6
PAINLEVEL_OUTOF10: 6

## 2023-01-01 ASSESSMENT — PAIN DESCRIPTION - FREQUENCY
FREQUENCY: CONTINUOUS
FREQUENCY: INTERMITTENT
FREQUENCY: CONTINUOUS

## 2023-01-01 ASSESSMENT — PAIN - FUNCTIONAL ASSESSMENT
PAIN_FUNCTIONAL_ASSESSMENT: ACTIVITIES ARE NOT PREVENTED
PAIN_FUNCTIONAL_ASSESSMENT: PREVENTS OR INTERFERES SOME ACTIVE ACTIVITIES AND ADLS
PAIN_FUNCTIONAL_ASSESSMENT: ACTIVITIES ARE NOT PREVENTED
PAIN_FUNCTIONAL_ASSESSMENT: ACTIVITIES ARE NOT PREVENTED
PAIN_FUNCTIONAL_ASSESSMENT: PREVENTS OR INTERFERES SOME ACTIVE ACTIVITIES AND ADLS
PAIN_FUNCTIONAL_ASSESSMENT: ACTIVITIES ARE NOT PREVENTED
PAIN_FUNCTIONAL_ASSESSMENT: PREVENTS OR INTERFERES SOME ACTIVE ACTIVITIES AND ADLS
PAIN_FUNCTIONAL_ASSESSMENT: ACTIVITIES ARE NOT PREVENTED
PAIN_FUNCTIONAL_ASSESSMENT: PREVENTS OR INTERFERES SOME ACTIVE ACTIVITIES AND ADLS
PAIN_FUNCTIONAL_ASSESSMENT: ACTIVITIES ARE NOT PREVENTED
PAIN_FUNCTIONAL_ASSESSMENT: PREVENTS OR INTERFERES SOME ACTIVE ACTIVITIES AND ADLS
PAIN_FUNCTIONAL_ASSESSMENT: 0-10
PAIN_FUNCTIONAL_ASSESSMENT: PREVENTS OR INTERFERES WITH MANY ACTIVE NOT PASSIVE ACTIVITIES
PAIN_FUNCTIONAL_ASSESSMENT: PREVENTS OR INTERFERES SOME ACTIVE ACTIVITIES AND ADLS

## 2023-01-01 ASSESSMENT — PAIN DESCRIPTION - PAIN TYPE
TYPE: ACUTE PAIN

## 2023-01-01 ASSESSMENT — ENCOUNTER SYMPTOMS
RESPIRATORY NEGATIVE: 1
NAUSEA: 1
ABDOMINAL PAIN: 1

## 2023-01-01 ASSESSMENT — PAIN SCALES - WONG BAKER
WONGBAKER_NUMERICALRESPONSE: 2
WONGBAKER_NUMERICALRESPONSE: 2
WONGBAKER_NUMERICALRESPONSE: 0
WONGBAKER_NUMERICALRESPONSE: 2
WONGBAKER_NUMERICALRESPONSE: 0

## 2023-01-10 ENCOUNTER — HOSPITAL ENCOUNTER (INPATIENT)
Age: 63
LOS: 2 days | Discharge: HOME | DRG: 357 | End: 2023-01-12
Attending: INTERNAL MEDICINE | Admitting: INTERNAL MEDICINE
Payer: COMMERCIAL

## 2023-01-10 DIAGNOSIS — C78.6 PERITONEAL CARCINOMATOSIS (HCC): Primary | ICD-10-CM

## 2023-01-10 PROBLEM — R62.7 FAILURE TO THRIVE IN ADULT: Status: ACTIVE | Noted: 2023-01-01

## 2023-01-10 PROBLEM — R10.9 ABDOMINAL PAIN: Status: ACTIVE | Noted: 2023-01-01

## 2023-01-10 LAB
A/G RATIO: 0.8 (ref 1.1–2.2)
ALBUMIN SERPL-MCNC: 3 G/DL (ref 3.4–5)
ALP BLD-CCNC: 153 U/L (ref 40–129)
ALT SERPL-CCNC: 6 U/L (ref 10–40)
ANION GAP SERPL CALCULATED.3IONS-SCNC: 13 MMOL/L (ref 3–16)
AST SERPL-CCNC: 12 U/L (ref 15–37)
BILIRUB SERPL-MCNC: <0.2 MG/DL (ref 0–1)
BUN BLDV-MCNC: 31 MG/DL (ref 7–20)
CALCIUM SERPL-MCNC: 9.2 MG/DL (ref 8.3–10.6)
CHLORIDE BLD-SCNC: 100 MMOL/L (ref 99–110)
CO2: 27 MMOL/L (ref 21–32)
CREAT SERPL-MCNC: 1.2 MG/DL (ref 0.6–1.2)
GFR SERPL CREATININE-BSD FRML MDRD: 50 ML/MIN/{1.73_M2}
GLUCOSE BLD-MCNC: 130 MG/DL (ref 70–99)
POTASSIUM REFLEX MAGNESIUM: 4.7 MMOL/L (ref 3.5–5.1)
SODIUM BLD-SCNC: 140 MMOL/L (ref 136–145)
TOTAL PROTEIN: 6.6 G/DL (ref 6.4–8.2)

## 2023-01-10 PROCEDURE — 85025 COMPLETE CBC W/AUTO DIFF WBC: CPT

## 2023-01-10 PROCEDURE — 6370000000 HC RX 637 (ALT 250 FOR IP): Performed by: INTERNAL MEDICINE

## 2023-01-10 PROCEDURE — 2580000003 HC RX 258: Performed by: INTERNAL MEDICINE

## 2023-01-10 PROCEDURE — 80053 COMPREHEN METABOLIC PANEL: CPT

## 2023-01-10 PROCEDURE — 2060000000 HC ICU INTERMEDIATE R&B

## 2023-01-10 RX ORDER — SODIUM CHLORIDE 0.9 % (FLUSH) 0.9 %
5-40 SYRINGE (ML) INJECTION PRN
Status: DISCONTINUED | OUTPATIENT
Start: 2023-01-10 | End: 2023-01-12 | Stop reason: HOSPADM

## 2023-01-10 RX ORDER — POLYETHYLENE GLYCOL 3350 17 G/17G
17 POWDER, FOR SOLUTION ORAL DAILY PRN
Status: DISCONTINUED | OUTPATIENT
Start: 2023-01-10 | End: 2023-01-12 | Stop reason: HOSPADM

## 2023-01-10 RX ORDER — SODIUM CHLORIDE 0.9 % (FLUSH) 0.9 %
5-40 SYRINGE (ML) INJECTION EVERY 12 HOURS SCHEDULED
Status: DISCONTINUED | OUTPATIENT
Start: 2023-01-10 | End: 2023-01-12 | Stop reason: HOSPADM

## 2023-01-10 RX ORDER — TRAMADOL HYDROCHLORIDE 50 MG/1
100 TABLET ORAL EVERY 6 HOURS PRN
Status: DISCONTINUED | OUTPATIENT
Start: 2023-01-10 | End: 2023-01-12 | Stop reason: HOSPADM

## 2023-01-10 RX ORDER — ACETAMINOPHEN 325 MG/1
650 TABLET ORAL EVERY 6 HOURS PRN
Status: DISCONTINUED | OUTPATIENT
Start: 2023-01-10 | End: 2023-01-12 | Stop reason: HOSPADM

## 2023-01-10 RX ORDER — TRAMADOL HYDROCHLORIDE 50 MG/1
50 TABLET ORAL EVERY 6 HOURS PRN
Status: DISCONTINUED | OUTPATIENT
Start: 2023-01-10 | End: 2023-01-12 | Stop reason: HOSPADM

## 2023-01-10 RX ORDER — SODIUM CHLORIDE 9 MG/ML
INJECTION, SOLUTION INTRAVENOUS PRN
Status: DISCONTINUED | OUTPATIENT
Start: 2023-01-10 | End: 2023-01-12 | Stop reason: HOSPADM

## 2023-01-10 RX ORDER — POTASSIUM CHLORIDE 20 MEQ/1
40 TABLET, EXTENDED RELEASE ORAL PRN
Status: DISCONTINUED | OUTPATIENT
Start: 2023-01-10 | End: 2023-01-12 | Stop reason: HOSPADM

## 2023-01-10 RX ORDER — POTASSIUM CHLORIDE 7.45 MG/ML
10 INJECTION INTRAVENOUS PRN
Status: DISCONTINUED | OUTPATIENT
Start: 2023-01-10 | End: 2023-01-12 | Stop reason: HOSPADM

## 2023-01-10 RX ORDER — ONDANSETRON 2 MG/ML
4 INJECTION INTRAMUSCULAR; INTRAVENOUS EVERY 6 HOURS PRN
Status: DISCONTINUED | OUTPATIENT
Start: 2023-01-10 | End: 2023-01-12 | Stop reason: HOSPADM

## 2023-01-10 RX ORDER — MAGNESIUM SULFATE IN WATER 40 MG/ML
2000 INJECTION, SOLUTION INTRAVENOUS PRN
Status: DISCONTINUED | OUTPATIENT
Start: 2023-01-10 | End: 2023-01-12 | Stop reason: HOSPADM

## 2023-01-10 RX ORDER — ACETAMINOPHEN 325 MG/1
650 TABLET ORAL EVERY 6 HOURS
Status: DISCONTINUED | OUTPATIENT
Start: 2023-01-10 | End: 2023-01-12 | Stop reason: HOSPADM

## 2023-01-10 RX ORDER — ACETAMINOPHEN 650 MG/1
650 SUPPOSITORY RECTAL EVERY 6 HOURS PRN
Status: DISCONTINUED | OUTPATIENT
Start: 2023-01-10 | End: 2023-01-12 | Stop reason: HOSPADM

## 2023-01-10 RX ORDER — KETOROLAC TROMETHAMINE 15 MG/ML
15 INJECTION, SOLUTION INTRAMUSCULAR; INTRAVENOUS EVERY 6 HOURS PRN
Status: DISCONTINUED | OUTPATIENT
Start: 2023-01-10 | End: 2023-01-12 | Stop reason: HOSPADM

## 2023-01-10 RX ORDER — ONDANSETRON 4 MG/1
4 TABLET, ORALLY DISINTEGRATING ORAL EVERY 8 HOURS PRN
Status: DISCONTINUED | OUTPATIENT
Start: 2023-01-10 | End: 2023-01-12 | Stop reason: HOSPADM

## 2023-01-10 RX ORDER — ENOXAPARIN SODIUM 100 MG/ML
40 INJECTION SUBCUTANEOUS DAILY
Status: DISCONTINUED | OUTPATIENT
Start: 2023-01-11 | End: 2023-01-12 | Stop reason: HOSPADM

## 2023-01-10 RX ORDER — MAGNESIUM HYDROXIDE/ALUMINUM HYDROXICE/SIMETHICONE 120; 1200; 1200 MG/30ML; MG/30ML; MG/30ML
30 SUSPENSION ORAL EVERY 6 HOURS PRN
Status: DISCONTINUED | OUTPATIENT
Start: 2023-01-10 | End: 2023-01-12 | Stop reason: HOSPADM

## 2023-01-10 RX ADMIN — ACETAMINOPHEN 650 MG: 325 TABLET ORAL at 23:55

## 2023-01-10 RX ADMIN — SODIUM CHLORIDE, PRESERVATIVE FREE 10 ML: 5 INJECTION INTRAVENOUS at 23:56

## 2023-01-11 ENCOUNTER — APPOINTMENT (OUTPATIENT)
Dept: CT IMAGING | Age: 63
DRG: 357 | End: 2023-01-11
Attending: INTERNAL MEDICINE
Payer: COMMERCIAL

## 2023-01-11 PROBLEM — D50.9 MICROCYTIC ANEMIA: Status: ACTIVE | Noted: 2023-01-11

## 2023-01-11 PROBLEM — R18.8 ASCITES: Status: ACTIVE | Noted: 2023-01-01

## 2023-01-11 LAB
ANISOCYTOSIS: ABNORMAL
ATYPICAL LYMPHOCYTE RELATIVE PERCENT: 1 % (ref 0–6)
BASOPHILS ABSOLUTE: 0 K/UL (ref 0–0.2)
BASOPHILS RELATIVE PERCENT: 0 %
EOSINOPHILS ABSOLUTE: 0.2 K/UL (ref 0–0.6)
EOSINOPHILS RELATIVE PERCENT: 1 %
HCT VFR BLD CALC: 37 % (ref 36–48)
HEMOGLOBIN: 10.9 G/DL (ref 12–16)
HYPERCHROMASIA: ABNORMAL
HYPOCHROMIA: ABNORMAL
LYMPHOCYTES ABSOLUTE: 1.7 K/UL (ref 1–5.1)
LYMPHOCYTES RELATIVE PERCENT: 8 %
MACROCYTES: ABNORMAL
MCH RBC QN AUTO: 20.6 PG (ref 26–34)
MCHC RBC AUTO-ENTMCNC: 29.4 G/DL (ref 31–36)
MCV RBC AUTO: 70.1 FL (ref 80–100)
MICROCYTES: ABNORMAL
MONOCYTES ABSOLUTE: 0.6 K/UL (ref 0–1.3)
MONOCYTES RELATIVE PERCENT: 3 %
MYELOCYTE PERCENT: 1 %
NEUTROPHILS ABSOLUTE: 16.2 K/UL (ref 1.7–7.7)
NEUTROPHILS RELATIVE PERCENT: 86 %
OVALOCYTES: ABNORMAL
PDW BLD-RTO: 31.7 % (ref 12.4–15.4)
PLATELET # BLD: 657 K/UL (ref 135–450)
PMV BLD AUTO: 7.8 FL (ref 5–10.5)
POIKILOCYTES: ABNORMAL
POLYCHROMASIA: ABNORMAL
RBC # BLD: 5.29 M/UL (ref 4–5.2)
SCHISTOCYTES: ABNORMAL
SPHEROCYTES: ABNORMAL
STOMATOCYTES: ABNORMAL
WBC # BLD: 18.6 K/UL (ref 4–11)

## 2023-01-11 PROCEDURE — 70450 CT HEAD/BRAIN W/O DYE: CPT

## 2023-01-11 PROCEDURE — 97116 GAIT TRAINING THERAPY: CPT

## 2023-01-11 PROCEDURE — 97162 PT EVAL MOD COMPLEX 30 MIN: CPT

## 2023-01-11 PROCEDURE — 6360000002 HC RX W HCPCS: Performed by: INTERNAL MEDICINE

## 2023-01-11 PROCEDURE — 97535 SELF CARE MNGMENT TRAINING: CPT

## 2023-01-11 PROCEDURE — 2580000003 HC RX 258: Performed by: INTERNAL MEDICINE

## 2023-01-11 PROCEDURE — 07BC3ZX EXCISION OF PELVIS LYMPHATIC, PERCUTANEOUS APPROACH, DIAGNOSTIC: ICD-10-PCS | Performed by: RADIOLOGY

## 2023-01-11 PROCEDURE — 97166 OT EVAL MOD COMPLEX 45 MIN: CPT

## 2023-01-11 PROCEDURE — 6370000000 HC RX 637 (ALT 250 FOR IP): Performed by: INTERNAL MEDICINE

## 2023-01-11 PROCEDURE — 2060000000 HC ICU INTERMEDIATE R&B

## 2023-01-11 RX ORDER — PROMETHAZINE HYDROCHLORIDE 25 MG/ML
6.25 INJECTION, SOLUTION INTRAMUSCULAR; INTRAVENOUS EVERY 6 HOURS PRN
Status: DISCONTINUED | OUTPATIENT
Start: 2023-01-11 | End: 2023-01-11

## 2023-01-11 RX ORDER — SODIUM CHLORIDE, SODIUM LACTATE, POTASSIUM CHLORIDE, CALCIUM CHLORIDE 600; 310; 30; 20 MG/100ML; MG/100ML; MG/100ML; MG/100ML
INJECTION, SOLUTION INTRAVENOUS CONTINUOUS
Status: DISCONTINUED | OUTPATIENT
Start: 2023-01-11 | End: 2023-01-12 | Stop reason: HOSPADM

## 2023-01-11 RX ORDER — PROCHLORPERAZINE EDISYLATE 5 MG/ML
10 INJECTION INTRAMUSCULAR; INTRAVENOUS EVERY 6 HOURS PRN
Status: DISCONTINUED | OUTPATIENT
Start: 2023-01-11 | End: 2023-01-12 | Stop reason: HOSPADM

## 2023-01-11 RX ADMIN — ONDANSETRON 4 MG: 2 INJECTION INTRAMUSCULAR; INTRAVENOUS at 18:03

## 2023-01-11 RX ADMIN — HYDROMORPHONE HYDROCHLORIDE 0.25 MG: 1 INJECTION, SOLUTION INTRAMUSCULAR; INTRAVENOUS; SUBCUTANEOUS at 12:23

## 2023-01-11 RX ADMIN — SODIUM CHLORIDE, POTASSIUM CHLORIDE, SODIUM LACTATE AND CALCIUM CHLORIDE: 600; 310; 30; 20 INJECTION, SOLUTION INTRAVENOUS at 22:58

## 2023-01-11 RX ADMIN — SODIUM CHLORIDE, PRESERVATIVE FREE 10 ML: 5 INJECTION INTRAVENOUS at 19:50

## 2023-01-11 RX ADMIN — HYDROMORPHONE HYDROCHLORIDE 0.25 MG: 1 INJECTION, SOLUTION INTRAMUSCULAR; INTRAVENOUS; SUBCUTANEOUS at 22:17

## 2023-01-11 RX ADMIN — ACETAMINOPHEN 650 MG: 325 TABLET ORAL at 12:22

## 2023-01-11 RX ADMIN — SODIUM CHLORIDE, PRESERVATIVE FREE 10 ML: 5 INJECTION INTRAVENOUS at 09:05

## 2023-01-11 RX ADMIN — ACETAMINOPHEN 650 MG: 325 TABLET ORAL at 06:19

## 2023-01-11 RX ADMIN — ALUMINUM HYDROXIDE, MAGNESIUM HYDROXIDE, AND SIMETHICONE 30 ML: 200; 200; 20 SUSPENSION ORAL at 15:45

## 2023-01-11 RX ADMIN — ENOXAPARIN SODIUM 40 MG: 100 INJECTION SUBCUTANEOUS at 09:06

## 2023-01-11 RX ADMIN — HYDROMORPHONE HYDROCHLORIDE 0.25 MG: 1 INJECTION, SOLUTION INTRAMUSCULAR; INTRAVENOUS; SUBCUTANEOUS at 15:45

## 2023-01-11 RX ADMIN — PROCHLORPERAZINE EDISYLATE 10 MG: 5 INJECTION, SOLUTION INTRAMUSCULAR; INTRAVENOUS at 23:48

## 2023-01-11 ASSESSMENT — PAIN - FUNCTIONAL ASSESSMENT
PAIN_FUNCTIONAL_ASSESSMENT: PREVENTS OR INTERFERES SOME ACTIVE ACTIVITIES AND ADLS
PAIN_FUNCTIONAL_ASSESSMENT: ACTIVITIES ARE NOT PREVENTED
PAIN_FUNCTIONAL_ASSESSMENT: ACTIVITIES ARE NOT PREVENTED

## 2023-01-11 ASSESSMENT — PAIN DESCRIPTION - FREQUENCY
FREQUENCY: CONTINUOUS

## 2023-01-11 ASSESSMENT — ENCOUNTER SYMPTOMS
RECTAL PAIN: 0
ABDOMINAL DISTENTION: 1
SHORTNESS OF BREATH: 1
EYES NEGATIVE: 1
ABDOMINAL PAIN: 1
VOMITING: 0

## 2023-01-11 ASSESSMENT — PAIN DESCRIPTION - PAIN TYPE
TYPE: ACUTE PAIN

## 2023-01-11 ASSESSMENT — PAIN SCALES - GENERAL
PAINLEVEL_OUTOF10: 0
PAINLEVEL_OUTOF10: 6
PAINLEVEL_OUTOF10: 6
PAINLEVEL_OUTOF10: 7
PAINLEVEL_OUTOF10: 0

## 2023-01-11 ASSESSMENT — PAIN DESCRIPTION - ORIENTATION
ORIENTATION: MID

## 2023-01-11 ASSESSMENT — PAIN DESCRIPTION - DESCRIPTORS
DESCRIPTORS: ACHING;PRESSURE

## 2023-01-11 ASSESSMENT — PAIN DESCRIPTION - LOCATION
LOCATION: ABDOMEN

## 2023-01-11 ASSESSMENT — PAIN DESCRIPTION - ONSET
ONSET: ON-GOING

## 2023-01-11 NOTE — H&P
Hospital Medicine History & Physical      PCP: Pcp No    Date of Admission: 1/10/2023    Date of Service: Pt seen/examined on 1/10/2023 and Admitted to Inpatient with expected LOS greater than two midnights due to medical therapy. Chief Complaint: Abdominal pain after paracentesis      History Of Present Illness:    57 y.o. female with newly diagnosed ovarian neoplasm and associated ascites status post paracentesis the day before presentation who presented to [facility] with increased generalized abdominal pain. Patient describes the pain is crampy, constant worse with movement, without any fevers, chills, nausea, vomiting, diarrhea, melena, hematochezia or hematemesis, dysuria, or frequency, or urgency or hematuria. She also reports right lower extremity weakness for the past week. Denies any tingling or numbness. Past Medical History:          Diagnosis Date    Ascites     Ovarian neoplasm        Past Surgical History:      No past surgical history on file. Medications Prior to Admission:      Prior to Admission medications    Not on File       Allergies:  Azithromycin and Amoxicillin    Social History:      The patient currently lives alone. TOBACCO:   reports that she has never smoked. She has never used smokeless tobacco.  ETOH:   reports current alcohol use. E-cigarette/Vaping       Questions Responses    E-cigarette/Vaping Use     Start Date     Passive Exposure     Quit Date     Counseling Given     Comments               Family History:    Reviewed and negative in regards to presenting illness/complaint. Adopted: Yes   Family history unknown: Yes       REVIEW OF SYSTEMS COMPLETED:   Pertinent positives as noted in the HPI. All other systems reviewed and negative.     PHYSICAL EXAM PERFORMED:    BP (!) 146/103   Pulse 99   Temp 97.7 °F (36.5 °C) (Oral)   Resp 17   SpO2 96%     General appearance: Well-developed/obese, nontoxic appearing in no apparent distress, appears stated age and cooperative. HEENT:  Normal cephalic, atraumatic without obvious deformity. Pupils equal, round, and reactive to light. Extra ocular muscles intact. Conjunctivae/corneas clear. Neck: Short/thick, with full range of motion. Lymphadenopathy, thyromegaly or nodules. . Trachea midline. Respiratory:  Normal respiratory effort. Clear to auscultation, bilaterally without Rales/Wheezes/Rhonchi. Cardiovascular:  Regular rate and rhythm with normal S1/S2 without murmurs, rubs or gallops. Abdomen: Soft, diffusely tender to palpation without peritoneal signs, non-distended with normal bowel sounds. Musculoskeletal:  No clubbing, cyanosis or edema bilaterally. Full range of motion without deformity. Skin: Skin color, texture, turgor normal.  No rashes or lesions. Neurologic:  AAO x 4 , CN II-XII grossly intact, soft touch intact, right lower extremity muscle strength 3/5, rest normal, no ataxia or tremors, fluent speech. Psychiatric: Normal mood, affect, thought content and insight  Capillary Refill: Brisk,3 seconds, normal  Peripheral Pulses: +2 palpable, equal bilaterally       Labs:     Recent Labs     01/10/23  2337   WBC 18.6*   HGB 10.9*   HCT 37.0   *     Recent Labs     01/10/23  2337      K 4.7      CO2 27   BUN 31*   CREATININE 1.2   CALCIUM 9.2     Recent Labs     01/10/23  2337   AST 12*   ALT 6*   BILITOT <0.2   ALKPHOS 153*     No results for input(s): INR in the last 72 hours. No results for input(s): Vita Earnest in the last 72 hours. Urinalysis:    No results found for: Rachael Divine, BACTERIA, RBCUA, BLOODU, [address], [address]    Radiology:     CXR: I have reviewed the CXR with the following interpretation: Not done. EKG:  I have reviewed the EKG with the following interpretation: Not done.     CT HEAD WO CONTRAST    (Results Pending)       Consults:    IP CONSULT TO DIETITIAN    ASSESSMENT:    Active Hospital Problems    Diagnosis Date Noted    Ascites [R18.8] 01/11/2023     Priority: Medium    Failure to thrive in adult [R62.7] 01/10/2023     Priority: Medium    Abdominal pain [R10.9] 01/10/2023     Priority: Medium    Microcytic anemia [D50.9] 01/11/2023     Priority: Low   Right lower extremity      PLAN:  -Pain management  -Dietary consult  -Check iron studies  -CT head without contrast and CTA head and neck    DVT Prophylaxis: Lovenox  Diet: ADULT DIET; Regular  Code Status: Full Code    PT/OT Eval Status: Pending  Dispo -to be determined       Arlet Smiley MD    Thank you Pcp No for the opportunity to be involved in this patient's care. If you have any questions or concerns please feel free to contact me at [phone].

## 2023-01-11 NOTE — CONSULTS
[facility]    Consult Note      Requesting Physician: Jaclyn Hartmann MD    CHIEF COMPLAINT:  Abdominal pain, distention, loss of appetite, generalized weakness and fatigue. HISTORY OF PRESENT ILLNESS:    Ms. Rita Kilgore  is a 57 y.o. female we are seeing in consultation for Abdominal pain, distention with recurrent ascites, recent paracentesis. Oncology history--Patient is well-known to the oncology practice. She had presented to Dr. Allegra Abraham with lower abdominal pain and pressure. Last summer, she had noted postmenopausal bleeding. Upon GYN evaluation, a large polypoid mass was noted protruding through the cervical os which was biopsied on 12/30/2022 and returned negative for malignancy. The CT CAP done on 12/29/2022 revealed large complex cystic mass in the lower abdomen and pelvis suspicious for malignant neoplasm of ovarian origin. There was evidence of moderate abdominal pelvic ascites with mesenteric and omental metastasis, right iliac lymphadenopathy and small hiatal hernia. The complex cystic mass measured approximately 10.1 x 12.4 x 13.6 cm in size and it was difficult to delineate the site of origin. The largest iliac lymph node noted was approximately 3 x 1.8 cm. CT scan of the chest was positive for moderate left-sided pleural effusion and small right-sided pleural effusion with no evidence of intrapulmonary or hilar/mediastinal lymphadenopathy. serum  on 1/6/2023 is significantly elevated -1141.2, serum ferritin of 171.5. On 1/10/2023, patient was reevaluated for intractable abdominal pain and underwent US guided paracentesis and approximately 3 L of fluid was removed. Patient complains of generalized weakness, fatigue, abdominal distention.     The clinical, radiological and biochemical findings are highly suggestive of Ca ovary, at least stage III. The oncology consult has been requested for evaluation of the same. Past Medical History:  Past Medical History:   Diagnosis Date    Ascites     Ovarian neoplasm        Past Surgical History:  No past surgical history on file.     Current Medications:  Current Facility-Administered Medications   Medication Dose Route Frequency Provider Last Rate Last Admin    sodium chloride flush 0.9 % injection 5-40 mL  5-40 mL IntraVENous 2 times per day Melissa Arce MD   10 mL at 01/11/23 0905    sodium chloride flush 0.9 % injection 5-40 mL  5-40 mL IntraVENous PRN Melissa Arce MD        0.9 % sodium chloride infusion   IntraVENous PRN Melissa Arce MD        [facility] by provider] enoxaparin (LOVENOX) injection 40 mg  40 mg SubCUTAneous Daily Melissa Arce MD   40 mg at 01/11/23 0906    ondansetron (ZOFRAN-ODT) disintegrating tablet 4 mg  4 mg Oral Q8H PRN Melissa Arce MD        Or    ondansetron [facility]) injection 4 mg  4 mg IntraVENous Q6H PRN Melissa Arce MD        polyethylene glycol (GLYCOLAX) packet 17 g  17 g Oral Daily PRN Melissa Arce MD        acetaminophen (TYLENOL) tablet 650 mg  650 mg Oral Q6H PRN John Garcia MD   650 mg at 01/11/23 9895    Or    acetaminophen (TYLENOL) suppository 650 mg  650 mg Rectal Q6H PRN Melissa Arce MD        magnesium sulfate 2000 mg in 50 mL IVPB premix  2,000 mg IntraVENous PRN Melissa Arce MD        potassium chloride (KLOR-CON M) extended release tablet 40 mEq  40 mEq Oral PRN Melissa Arce MD        Or    potassium bicarb-citric acid (EFFER-K) effervescent tablet 40 mEq  40 mEq Oral PRN Melissa Arce MD        Or    potassium chloride 10 mEq/100 mL IVPB (Peripheral Line)  10 mEq IntraVENous PRN Melissa Arce MD        aluminum & magnesium hydroxide-simethicone (MAALOX) 036-397-84 MG/5ML suspension 30 mL  30 mL Oral Q6H PRN John Garcia MD   30 mL at 01/11/23 [phone] acetaminophen (TYLENOL) tablet 650 mg  650 mg Oral Q6H Ruby Garcia MD   650 mg at 01/11/23 1222    ketorolac (TORADOL) injection 15 mg  15 mg IntraVENous Q6H PRN Dusty Galdamez MD        traMADol Chante Single) tablet 50 mg  50 mg Oral Q6H PRN Dusty Galdamez MD        Or    traMADol Chante Single) tablet 100 mg  100 mg Oral Q6H PRN Dusty Galdamez MD        HYDROmorphone (DILAUDID) injection 0.25 mg  0.25 mg IntraVENous Q3H PRN Dusty Gladamez MD   0.25 mg at 01/11/23 1545    Or    HYDROmorphone (DILAUDID) injection 0.5 mg  0.5 mg IntraVENous Q3H PRN Dusty Galdamez MD           Allergies: Allergies   Allergen Reactions    Azithromycin     Amoxicillin Rash       Social History:  Social History     Socioeconomic History    Marital status: Single     Spouse name: Not on file    Number of children: Not on file    Years of education: Not on file    Highest education level: Not on file   Occupational History    Not on file   Tobacco Use    Smoking status: Never    Smokeless tobacco: Never   Substance and Sexual Activity    Alcohol use: Yes     Comment: rarely    Drug use: Never    Sexual activity: Not on file   Other Topics Concern    Not on file   Social History Narrative    Not on file     Social Determinants of Health     Financial Resource Strain: Not on file   Food Insecurity: Not on file   Transportation Needs: Not on file   Physical Activity: Not on file   Stress: Not on file   Social Connections: Not on file   Intimate Partner Violence: Not on file   Housing Stability: Not on file          Family History:  Family History   Adopted: Yes   Family history unknown: Yes       REVIEW OF SYSTEMS:    Review of Systems   Constitutional:  Positive for appetite change and fatigue. Negative for fever. HENT:  Negative. Eyes: Negative. Respiratory:  Positive for shortness of breath. Cardiovascular: Negative. Gastrointestinal:  Positive for abdominal distention and abdominal pain.  Negative for rectal pain and vomiting. Endocrine: Negative. Genitourinary:  Positive for pelvic pain. Musculoskeletal: Negative. Skin: Negative. Neurological: Negative. Hematological: Negative. Psychiatric/Behavioral: Negative. PHYSICAL EXAM:      Vitals:  Patient Vitals for the past 24 hrs:   BP Temp Temp src Pulse Resp SpO2 Height Weight   01/11/23 1545 -- -- -- -- 18 -- -- --   01/11/23 1538 (!) 164/99 -- -- -- -- -- -- --   01/11/23 1531 (!) 158/102 97.9 °F (36.6 °C) Oral 93 -- 94 % -- --   01/11/23 1253 -- -- -- -- 16 -- -- --   01/11/23 1215 136/86 98.2 °F (36.8 °C) Oral 100 16 95 % -- --   01/11/23 0851 (!) 151/104 97.9 °F (36.6 °C) Oral 95 18 -- 5' 3\" (1.6 m) 196 lb (88.9 kg)   01/11/23 0503 (!) 141/100 97.6 °F (36.4 °C) Oral 96 16 96 % -- --   01/10/23 2352 (!) 146/103 97.7 °F (36.5 °C) Oral 99 17 96 % -- --   01/10/23 2116 (!) 142/86 97.5 °F (36.4 °C) Oral (!) 120 18 95 % -- --       Date 01/11/23 0000 - 01/11/23 2359   Shift 2994-3463 5858-0595 6225-3398 24 Hour Total   INTAKE   P.O. 120 360(0.5)  480   Shift Total(mL/kg) 120 360(4)  480(5.4)   OUTPUT   Urine  50(0.1)  50   Shift Total(mL/kg)  50(0.6)  50(0.6)   Weight (kg)  88.9 88.9 88.9       Physical Exam  Constitutional:       Appearance: Normal appearance. She is obese. HENT:      Head: Normocephalic and atraumatic. Nose: Nose normal.      Mouth/Throat:      Mouth: Mucous membranes are moist.   Eyes:      Extraocular Movements: Extraocular movements intact. Conjunctiva/sclera: Conjunctivae normal.   Cardiovascular:      Rate and Rhythm: Normal rate and regular rhythm. Pulmonary:      Effort: Pulmonary effort is normal.      Breath sounds: Normal breath sounds. Abdominal:      General: There is distension. Musculoskeletal:      Right lower leg: Edema present. Left lower leg: Edema present. Skin:     General: Skin is warm. Neurological:      General: No focal deficit present. Mental Status: She is alert. Psychiatric:         Mood and Affect: Mood normal.         Behavior: Behavior normal.          DATA:    PT/INR:    Recent Labs     01/10/23  2337   PROT 6.6     PTT:  No results for input(s): APTT in the last 720 hours. CMP:    Recent Labs     01/10/23  2337      K 4.7      CO2 27   BUN 31*   PROT 6.6     Mg:  No results for input(s): MG in the last 720 hours. Lab Results   Component Value Date    CALCIUM 9.2 01/10/2023       CBC:    Recent Labs     01/10/23  2337   WBC 18.6*   NEUTROABS 16.2*   LYMPHOPCT 8.0   RBC 5.29*   HGB 10.9*   HCT 37.0   MCV 70.1*   MCH 20.6*   MCHC 29.4*   RDW 31.7*   *        LDH:No results for input(s): LDH in the last 720 hours. Radiology Review:  CT HEAD WO CONTRAST  Narrative: CT HEAD WO CONTRAST    INDICATION: Right lower extremity weakness    COMPARISON: None. TECHNIQUE:  CT head was performed without intravenous contrast. Coronal and sagittal reformations were created. Up-to-date CT equipment and radiation dose reduction techniques were employed. FINDINGS:    No acute intracranial hemorrhage or extra-axial fluid collection. No mass effect or midline shift. Gray-white matter differentiation is maintained. Ventricles and sulci are appropriate for the patient's age. The basal cisterns are patent. The calvarium is intact. Visualized paranasal sinuses and mastoid air cells are clear. Impression: No acute intracranial hemorrhage or mass effect. Problem List  Patient Active Problem List   Diagnosis    Failure to thrive in adult    Abdominal pain    Ascites    Microcytic anemia       IMPRESSION/RECOMMENDATIONS:    1. Complex cystic mass measuring approximately 10.1 x 12.4 x 13.6 cm in the pelvis,likely ovarian.    -Reviewed the imaging studies which revealed multiple omental lesions, right pelvic lymphadenopathy. The largest iliac lymph node noted was approximately 3 x 1.8 cm.   CT scan of the chest was positive for moderate left-sided pleural effusion and small right-sided pleural effusion with no evidence of intrapulmonary or hilar/mediastinal lymphadenopathy.   -Serum  on 1/6/2023 was elevated to  -1141.2, serum ferritin of 171.5.  -The above clinical and neurological findings, biochemical findings are highly suspicious of ovarian malignancy.  -Tissue diagnosis is awaited. -IR consult placed for the biopsy of the omental mass. 2. Hypertension  The blood pressure has been noted to be elevated. -Management per primary team.    Patient can be discharged after the omental biopsy if okay with the primary team.  To follow-up as an outpatient at the [facility] office approximately 1 week after the biopsy to discuss the test results and further management plan. Thank you for asking me to see the patient.        Iglesia Ellington MD  Please Contact Through [employer]

## 2023-01-11 NOTE — PROGRESS NOTES
Occupational Therapy  Facility/Department: [facility]  Occupational Therapy Initial Assessment/Treatment    Name: Evens Root  : 1960  MRN: [id number]  Date of Service: 2023    Discharge Recommendations:   Evens Root scored a 20/24 on the AM-PAC ADL Inpatient form. Current research shows that an AM-PAC score of 18 or greater is typically associated with a discharge to the patient's home setting. Based on the patient's AM-PAC score, and their current ADL deficits, it is recommended that the patient have 2-3 sessions per week of Occupational Therapy at d/c to increase the patient's independence. At this time, this patient demonstrates the endurance and safety to discharge  home with home services and a follow up treatment frequency of 2-3x/wk. Please see assessment section for further patient specific details. If patient discharges prior to next session this note will serve as a discharge summary. Please see below for the latest assessment towards goals. OT Equipment Recommendations  Equipment Needed: No       Patient Diagnosis(es): There were no encounter diagnoses. Past Medical History:  has a past medical history of Ascites and Ovarian neoplasm. Past Surgical History:  has no past surgical history on file. Treatment Diagnosis: Impaired functional activity tolerance, ADL, functional mobility      Assessment   Performance deficits / Impairments: Decreased functional mobility ; Decreased ADL status; Decreased endurance  Assessment: Pt presents with a decline in functional independence. Pt is from home alone and independent. Pt with new diagnosis of ovarian neoplasm and associated ascites. Currently, pt req CG-SBA for mobility and ADL. Pt c/o fatigue which is limiting her functional independence. Feel pt will benefit from further OT services.   Treatment Diagnosis: Impaired functional activity tolerance, ADL, functional mobility  Prognosis: Good  Decision Making: Medium Complexity  REQUIRES OT FOLLOW-UP: Yes  Activity Tolerance  Activity Tolerance: Patient limited by fatigue  Activity Tolerance Comments: Pt required seated rests        Plan   Occupational Therapy Plan  Times Per Week: 2-5  Current Treatment Recommendations: Strengthening, Balance training, Functional mobility training, Endurance training, Self-Care / ADL     Restrictions  Position Activity Restriction  Other position/activity restrictions: up with assistance    Subjective   General  Chart Reviewed: Yes  Patient assessed for rehabilitation services?: Yes  Additional Pertinent Hx: Pt admitted 1/10/23 with generalized abdominal pain. Pt with newly diagnosed ovarian neoplasm and associated ascites status post paracentesis. CT Head=   No acute intracranial hemorrhage or mass effect  Family / Caregiver Present: No  Referring Practitioner: Dr. Ernestina Pollard  Diagnosis: Failure to thrive     Social/Functional History  Social/Functional History  Lives With: Alone  Type of Home: House  Home Layout: One level  Home Access: Stairs to enter with rails  Entrance Stairs - Number of Steps: 2  Bathroom Shower/Tub: Walk-in shower  Bathroom Toilet: Handicap height  Bathroom Equipment: Hand-held shower, Shower chair  Has the patient had two or more falls in the past year or any fall with injury in the past year?: No  ADL Assistance: Independent  Homemaking Assistance: Independent  Ambulation Assistance: Independent  Transfer Assistance: Independent  Active : No  Patient's  Info: friends bring groceries  Occupation: Retired  Type of Occupation: Teacher, 1st grade  [address]: Knitting, reading, book club, go to lunch with friends       Objective      Safety Devices  Type of Devices: Left in chair;Call light within reach; Chair alarm in place;Nurse notified  Balance  Sitting: Intact  Standing:  (CGA without AD)  Toilet Transfers  Toilet - Technique: Ambulating  Equipment Used: Standard toilet (grab bar)  Toilet Transfer: Contact guard assistance  AROM: Within functional limits  Strength: Within functional limits  Coordination: Within functional limits  ADL  Grooming: Stand by assistance (to wash hands and face, standing at sink)  LE Dressing: Stand by assistance  Toileting: Stand by assistance        Bed mobility  Supine to Sit: Stand by assistance (HOB elevated)  Transfers  Stand Step Transfers: Contact guard assistance  Sit to stand: Contact guard assistance  Stand to sit: Contact guard assistance  Vision  Vision: Impaired  Vision Exceptions: Wears glasses at all times  Hearing  Hearing: Within functional limits  Cognition  Overall Cognitive Status: WNL  Orientation  Overall Orientation Status: Within Normal Limits                  Education Given To: Patient  Education Provided: Role of Therapy;Plan of Care  Education Method: Verbal  Barriers to Learning: None  Education Outcome: Verbalized understanding           Hand Dominance  Hand Dominance: Right        Treatment included ADL and transfer training.       AM-PAC Score        AM-[facility] Inpatient Daily Activity Raw Score: 20 (01/11/23 1056)  AM-PAC Inpatient ADL T-Scale Score : 42.03 (01/11/23 1056)  ADL Inpatient CMS 0-100% Score: 38.32 (01/11/23 1056)  ADL Inpatient CMS G-Code Modifier : CJ (01/11/23 1056)      Goals                        No goals met  Short Term Goals  Time Frame for Short Term Goals: Discharge  Short Term Goal 1: Transfer to/from toilet with supervision  Short Term Goal 2: Stance with supervision x6 min while engaging in ADL/functional mobility  Short Term Goal 3: Lower body dressing with supervision  Patient Goals   Patient goals : Go home       Therapy Time   Individual Concurrent Group Co-treatment   Time In 1005         Time Out 1037         Minutes 32         Timed Code Treatment Minutes: 22 Minutes   Total Treatment [address], OTR/L [id number]

## 2023-01-11 NOTE — PROGRESS NOTES
Patient admitted to [address] via wheelchair from home}. Patient oriented to patient room including call light and bed controls. Admission assessment completed - see admission flowsheet documentation. Patient is a high fall risk. Safety measures instituted per policy. Patient oriented to unit policies and procedures including: pain management practices, unit safety precautions, family rapid response, q4h vital signs and assessments, daily 4am lab draws, weekly chest x-rays, daily chlorhexidine bathing, standing transfusion orders, and routine central line care. Also discussed use of call light and how to get in touch with nursing staff. Stressed the importance of calling out immediately for any changes in condition including but not limited to: pain, chills, fever, nausea, vomiting, diarrhea, chest pain, sob/polk, assistance with toileting, bleeding, or any other symptoms that are out of the ordinary for the patient. Patient verbalizes understanding of all instructions and will call for assistance as needed.

## 2023-01-11 NOTE — PLAN OF CARE
Problem: Safety - Adult  Goal: Free from fall injury  Outcome: Progressing  Note: Orthostatic vital signs obtained at start of shift - see flowsheet for details. Pt does not meet criteria for orthostasis. Pt is a Med fall risk. See Lucalexusia Samantha Fall Score and ABCDS Injury Risk assessments. - Screening for Orthostasis AND not a [place] Risk per JONES/ABCDS: Pt bed is in low position, side rails up, call light and belongings are in reach. Fall risk light is on outside pts room. Pt encouraged to call for assistance as needed. Will continue with hourly rounds for PO intake, pain needs, toileting and repositioning as needed. Problem: Skin/Tissue Integrity - Adult  Goal: Skin integrity remains intact  Outcome: Progressing  Note: Pt with no skin breakdown noted this shift. Problem: Hematologic - Adult  Goal: Maintains hematologic stability  Outcome: Progressing  Note: Patient's hemoglobin this AM:   Recent Labs     01/10/23  2337   HGB 10.9*     Patient's platelet count this AM:   Recent Labs     01/10/23  2337   *    Thrombocytopenia not present at this time. Patient showing no signs or symptoms of active bleeding. Transfusion not indicated at this time. Patient verbalizes understanding of all instructions. Will continue to assess and implement POC. Call light within reach and hourly rounding in place.

## 2023-01-11 NOTE — PROGRESS NOTES
Physical Therapy  Facility/Department: [address]  Physical Therapy Initial Assessment/Treatment    Name: Katey Lynn  : 1960  MRN: [id number]  Date of Service: 2023    Discharge Recommendations:    Katey Lynn scored a 20/24 on the AM-PAC short mobility form. Current research shows that an AM-PAC score of 18 or greater is typically associated with a discharge to the patient's home setting. Based on the patient's AM-PAC score and their current functional mobility deficits, it is recommended that the patient have 2-3 sessions per week of Physical Therapy at d/c to increase the patient's independence. At this time, this patient demonstrates the endurance and safety to discharge home with home services and a follow up treatment frequency of 2-3x/wk. Please see assessment section for further patient specific details. If patient discharges prior to next session this note will serve as a discharge summary. Please see below for the latest assessment towards goals. PT Equipment Recommendations  Equipment Needed: No  Other: continue to assess      Patient Diagnosis(es): There were no encounter diagnoses. Past Medical History:  has a past medical history of Ascites and Ovarian neoplasm. Past Surgical History:  has no past surgical history on file. Assessment   Body Structures, Functions, Activity Limitations Requiring Skilled Therapeutic Intervention: Decreased functional mobility   Assessment: pt presents from home with abdominal pain. She lives alone and is normally IND with mobility without AD. She has friends who have been assisting her for the last few weeks with grocery shopping and errands as needed. She is currently limited by abdominal pain and fatigue with mobility, requiring CGA for transfers without AD, CGA to ambulate up 60ft without AD. No SOB or dizziness noted. She does have lateral sway but no LOB.  PT rec return home with HHPT and will continue to follow in acute setting to progress mobility toward independence. Treatment Diagnosis: gait difficulty  Therapy Prognosis: Good  Decision Making: Medium Complexity  Requires PT Follow-Up: Yes  Activity Tolerance  Activity Tolerance: Patient tolerated evaluation without incident;Patient limited by endurance; Patient limited by fatigue     Plan   Physcial Therapy Plan  General Plan:  (2-5x/week)  Current Treatment Recommendations: Strengthening, Balance training, Endurance training, Therapeutic activities, Gait training, Stair training, Patient/Caregiver education & training, Transfer training  Safety Devices  Type of Devices: Left in chair, Call light within reach, Chair alarm in place, Nurse notified     Restrictions  Position Activity Restriction  Other position/activity restrictions: up with assistance     Subjective   General  Chart Reviewed: Yes  Patient assessed for rehabilitation services?: Yes  Additional Pertinent Hx: 57 y.o. female with newly diagnosed ovarian neoplasm and associated ascites status post paracentesis the day before presentation who presented to [facility] with increased generalized abdominal pain. Patient describes the pain is crampy, constant worse with movement, without any fevers, chills, nausea, vomiting, diarrhea, melena, hematochezia or hematemesis, dysuria, or frequency, or urgency or hematuria. She also reports right lower extremity weakness for the past week. Family / Caregiver Present: No  Referring Practitioner: Mk Medina MD  Referral Date : 01/10/23  Diagnosis: abdominal pain  General Comment  Comments: pt cleared to see for therapy by RN  Subjective  Subjective: pt semisupine in bed on arrival, agreeable to therapy. Pain 4/10 in abdomen, positioned for comfort at end session.          Social/Functional History  Social/Functional History  Lives With: Alone  Type of Home: House  Home Layout: One level  Home Access: Stairs to enter with rails  Entrance Stairs - Number of Steps: 2  Bathroom Shower/Tub: Walk-in shower  Bathroom Toilet: Handicap height  Bathroom Equipment: Hand-held shower, Shower chair  Has the patient had two or more falls in the past year or any fall with injury in the past year?: No  ADL Assistance: Independent  Homemaking Assistance: Independent  Ambulation Assistance: Independent  Transfer Assistance: Independent  Active : No  Patient's  Info: friends bring groceries  Occupation: Retired  Type of Occupation: Teacher, 1st grade  [address]: Knitting, reading, book club, go to lunch with friends  Vision/Hearing  Vision  Vision: Impaired  Vision Exceptions: Wears glasses at all times  Hearing  Hearing: Within functional limits    Cognition   Orientation  Overall Orientation Status: Within Normal Limits     Objective   Heart Rate: 95  Heart Rate Source: Monitor  BP: (!) 151/104  BP Location: Right upper arm  BP Method: Automatic  Patient Position: Semi fowlers  MAP (Calculated): 120  Resp: 18  SpO2: 96 %  O2 Device: None (Room air)        Gross Assessment  AROM: Within functional limits  Strength: Generally decreased, functional (BLE hip flexion 3/5, knee extension 4/4 BLE)  Coordination: Within functional limits  Tone: Normal  Sensation: Intact     Balance  Sitting: Intact  Standing:  (CGA without AD)  Bed mobility  Supine to Sit: Stand by assistance (HOB elevated)  Transfers  Sit to Stand: Contact guard assistance (no AD from bed and recliner)  Stand to Sit: Contact guard assistance  Ambulation  Surface: Level tile  Device: No Device  Assistance: Contact guard assistance  Quality of Gait: Lateral sway noted with WBOS, no LOB noted. Gait Deviations: Staggers; Slow Yandy; Increased FARA  Distance: 10ftx2, 60ft  Comments: pt fatigues quickly and requested to sit after above distances, denies CUMMINS or dizziness. \"Just tired. \"           AM-PAC Score  AM-PAC Inpatient Mobility Raw Score : 20 (01/11/23 1105)  AM-PAC Inpatient T-Scale Score : 47.67 (01/11/23 1105)  Mobility Inpatient CMS 0-100% Score: 35.83 (01/11/23 1105)  Mobility Inpatient CMS G-Code Modifier : CJ (01/11/23 1105)        Goals  Short Term Goals  Time Frame for Short Term Goals: discharge  Short Term Goal 1: pt will perform STS to no AD IND  Short Term Goal 2: pt will amb 150ft with no AD IND  Short Term Goal 3: pt will negotiate 2 stairs with rail mod I       Education  Patient Education  Education Given To: Patient  Education Provided: Role of Therapy;Plan of Care;Energy Conservation  Education Method: Verbal  Barriers to Learning: None  Education Outcome: Verbalized understanding      Therapy Time   Individual Concurrent Group Co-treatment   Time In 1005         Time Out 1035         Minutes 30             Timed Code Treatment Minutes:30    Total Treatment Minutes:30    If patient is discharged prior to next treatment, this note will serve as the discharge summary.     Fuentes Isbell PT, DPT, NCS, CSRS

## 2023-01-11 NOTE — PROGRESS NOTES
Pt's BP was 151/104. I rechecked and BP was 148/103. Dr. Meagan Jacob notified. No new orders at this time. Per MD, continue to observe. Will continue to monitor.

## 2023-01-11 NOTE — PROGRESS NOTES
4 Eyes Admission Assessment     I agree as the admission nurse that 2 RN's have performed a thorough Head to Toe Skin Assessment on the patient. ALL assessment sites listed below have been assessed on admission. Areas assessed by both nurses: Gregory November  [x]   Head, Face, and Ears   [x]   Shoulders, Back, and Chest  [x]   Arms, Elbows, and Hands   [x]   Coccyx, Sacrum, and Ischium  [x]   Legs, Feet, and Heels        Does the Patient have Skin Breakdown?   N/A        Jl Prevention initiated:  N/A  Wound Care Orders initiated:  N/A      WOC nurse consulted for Pressure Injury (Stage 3,4, Unstageable, DTI, NWPT, and Complex wounds) or Jl score 18 or lower:  N/A    Nurse 1 eSignature: Electronically signed by Terrell Myles RN on 1/10/23 at 9:57 PM EST    **SHARE this note so that the co-signing nurse is able to place an eSignature**    Nurse 2 eSignature: Electronically signed by Martine Howard RN on 1/11/23 at 2:00 AM EST

## 2023-01-11 NOTE — CARE COORDINATION
Patient with PT/OT evals today -both 20. PT/OT suggesting HHC. Patient from home alone and independent PTA. Came in with abdominal pain and swelling. Newly diagnosed with Ovarian CA and had paracentesis with relief. Oncology to see and plan to biopsy tomorrow. CM will continue to follow patient until discharge.  Electronically signed by Red Perez RN on 1/11/2023 at 5:14 PM

## 2023-01-11 NOTE — PROGRESS NOTES
Comprehensive Nutrition Assessment    RECOMMENDATIONS:  PO Diet: Continue regular diet  ONS: Start Ensure MAX  Nutrition Education: Education not indicated     NUTRITION ASSESSMENT:   Nutritional summary & status: Consult for poor appetite/intake x5d PTA. Pt w/ abd pain r/t ascites. Pt s/p paracentesis 1/10. No wt hx available. Pt reports improved abd pain following paracentesis and able to tolerate more food. Pt reports focusing on protein intake. RD rec'd smaller, more frequent meals as able. Pt agreeable. Will order ONS. Admission/PMH: Abdominal pain after paracentesis // ovarian neoplasm    MALNUTRITION ASSESSMENT  Context of Malnutrition: Acute Illness   Malnutrition Status: No malnutrition  Findings of the 6 clinical characteristics of malnutrition (Minimum of 2 out of 6 clinical characteristics is required to make the diagnosis of moderate or severe Protein Calorie Malnutrition based on AND/ASPEN Guidelines):  Energy Intake:  Mild decrease in energy intake (Comment)  Weight Loss:  No significant weight loss     Body Fat Loss:  No significant body fat loss     Muscle Mass Loss:  No significant muscle mass loss        NUTRITION DIAGNOSIS   Inadequate oral intake related to early satiety as evidenced by GI abnormality    Nutrition Monitoring and Evaluation:   Food/Nutrient Intake Outcomes:  Food and Nutrient Intake, Supplement Intake  Physical Signs/Symptoms Outcomes:  Biochemical Data, Nutrition Focused Physical Findings, Weight     OBJECTIVE DATA: Significant to nutrition assessment  Nutrition Related Findings: ; +1 BLE edema  Wounds: None  Nutrition Goals: PO intake 75% or greater, by next RD assessment     CURRENT NUTRITION THERAPIES  ADULT DIET;  Regular  Diet NPO  PO Intake: %, 26-50%   PO Supplement Intake:None Ordered  Additional Sources of Calories/IVF:n/a     ANTHROPOMETRICS  Current Height: 5' 3\" (160 cm)  Current Weight: 196 lb (88.9 kg)    Admission weight: 196 lb (88.9 kg)  Ideal Body Weight (IBW): 115 lbs  (52 kg)    BMI: 34.8    COMPARATIVE STANDARDS  Energy (kcal):  6073-4668 (15-18kcal/kg CBW)     Protein (g):  62-73 (1.2-1.4g/kg IBW)       Fluid (mL/day):  1ml/kcal or defer to MD    The patient will be monitored per nutrition standards of care. Consult dietitian if additional nutrition interventions are needed prior to RD reassessment.      Chaz Kruse [address], [address]  [place]:  [phone]  Office:  [phone]

## 2023-01-11 NOTE — PROGRESS NOTES
Hospitalist Progress Note      PCP: Pcp No    Date of Admission: 1/10/2023    Chief Complaint:     Hospital Course:     Subjective:   Abdominal pain following paracentesis has improved to her baseline discomfort. .  No nausea or vomiting. .  Has generalized fatigue and weakness but no focal deficits      Medications:  Reviewed    Infusion Medications    sodium chloride       Scheduled Medications    sodium chloride flush  5-40 mL IntraVENous 2 times per day    enoxaparin  40 mg SubCUTAneous Daily    acetaminophen  650 mg Oral Q6H     PRN Meds: sodium chloride flush, sodium chloride, ondansetron **OR** ondansetron, polyethylene glycol, acetaminophen **OR** acetaminophen, magnesium sulfate, potassium chloride **OR** potassium alternative oral replacement **OR** potassium chloride, aluminum & magnesium hydroxide-simethicone, ketorolac, traMADol **OR** traMADol, HYDROmorphone **OR** HYDROmorphone      Intake/Output Summary (Last 24 hours) at 1/11/2023 1322  Last data filed at 1/11/2023 1215  Gross per 24 hour   Intake 720 ml   Output 50 ml   Net 670 ml       Physical Exam Performed:    /86   Pulse 100   Temp 98.2 °F (36.8 °C) (Oral)   Resp 16   Ht 5' 3\" (1.6 m)   Wt 196 lb (88.9 kg)   SpO2 95%   BMI 34.72 kg/m²     General appearance: No apparent distress, appears stated age and cooperative. HEENT: Pupils equal, round, and reactive to light. Conjunctivae/corneas clear. Neck: Supple, with full range of motion. No jugular venous distention. Trachea midline. Respiratory:  Normal respiratory effort. Clear to auscultation, bilaterally without Rales/Wheezes/Rhonchi. Cardiovascular: Regular rate and rhythm with normal S1/S2 without murmurs, rubs or gallops. Abdomen: Soft, non-tender, non-distended with normal bowel sounds. Musculoskeletal: No clubbing, cyanosis or edema bilaterally. Full range of motion without deformity. Skin: Skin color, texture, turgor normal.  No rashes or lesions.   Neurologic: Neurovascularly intact without any focal sensory/motor deficits. Cranial nerves: II-XII intact, grossly non-focal.  Psychiatric: Alert and oriented, thought content appropriate, normal insight  Capillary Refill: Brisk, 3 seconds, normal   Peripheral Pulses: +2 palpable, equal bilaterally       Labs:   Recent Labs     01/10/23  2337   WBC 18.6*   HGB 10.9*   HCT 37.0   *     Recent Labs     01/10/23  2337      K 4.7      CO2 27   BUN 31*   CREATININE 1.2   CALCIUM 9.2     Recent Labs     01/10/23  2337   AST 12*   ALT 6*   BILITOT <0.2   ALKPHOS 153*     No results for input(s): INR in the last 72 hours. No results for input(s): Estle Carrow in the last 72 hours. Urinalysis:    No results found for: [place], BACTERIA, RBCUA, BLOODU, SPECGRAV, [address]    Radiology:  CT HEAD WO CONTRAST   Final Result      No acute intracranial hemorrhage or mass effect. IP CONSULT TO DIETITIAN    Assessment/Plan:      -Peritoneal carcinomatosis with ascites, likely metastatic ovarian cancer. .  Still needing a tissue diagnosis  Await cytology from paracentesis. .  Discussed with oncology, planning on biopsy tomorrow    -Acute on chronic abdominal pain/discomfort related to peritoneal carcinomatosis and paracentesis--currently improved to baseline--continue pain regimen    -Reactive leukocytosis and thrombocytosis--appears to be chronic without evidence of infection--follow-up with oncology    -Generalized weakness/fatigue due to malignancy--reportedly had mild right-sided weakness yesterday after paracentesis but resolved--no concern for stroke. .  CT head was negative  PT and OT recommending home health    -Obesity with BMI 34      DVT Prophylaxis: Lovenox  Diet: ADULT DIET;  Regular  Code Status: Full Code  PT/OT Eval Status:     Dispo -Home after oncology eval..  Planning on biopsy tomorrow          Ricardo Shipley MD

## 2023-01-12 ENCOUNTER — APPOINTMENT (OUTPATIENT)
Dept: CT IMAGING | Age: 63
DRG: 357 | End: 2023-01-12
Attending: INTERNAL MEDICINE
Payer: COMMERCIAL

## 2023-01-12 VITALS
DIASTOLIC BLOOD PRESSURE: 102 MMHG | SYSTOLIC BLOOD PRESSURE: 142 MMHG | TEMPERATURE: 96.4 F | HEART RATE: 88 BPM | WEIGHT: 196 LBS | RESPIRATION RATE: 18 BRPM | BODY MASS INDEX: 34.73 KG/M2 | HEIGHT: 63 IN | OXYGEN SATURATION: 91 %

## 2023-01-12 LAB
BACTERIA: ABNORMAL /HPF
BASOPHILS ABSOLUTE: 0.2 K/UL (ref 0–0.2)
BASOPHILS RELATIVE PERCENT: 0.7 %
BILIRUBIN URINE: ABNORMAL
BLOOD, URINE: ABNORMAL
CLARITY: CLEAR
COLOR: YELLOW
EOSINOPHILS ABSOLUTE: 0 K/UL (ref 0–0.6)
EOSINOPHILS RELATIVE PERCENT: 0.2 %
EPITHELIAL CELLS, UA: ABNORMAL /HPF (ref 0–5)
FERRITIN: 291.9 NG/ML (ref 15–150)
GLUCOSE URINE: NEGATIVE MG/DL
HCT VFR BLD CALC: 38.3 % (ref 36–48)
HEMOGLOBIN: 11.1 G/DL (ref 12–16)
HYALINE CASTS: >40 /LPF (ref 0–2)
KETONES, URINE: NEGATIVE MG/DL
LEUKOCYTE ESTERASE, URINE: ABNORMAL
LYMPHOCYTES ABSOLUTE: 1.4 K/UL (ref 1–5.1)
LYMPHOCYTES RELATIVE PERCENT: 5.9 %
MCH RBC QN AUTO: 20.8 PG (ref 26–34)
MCHC RBC AUTO-ENTMCNC: 28.9 G/DL (ref 31–36)
MCV RBC AUTO: 71.7 FL (ref 80–100)
MICROSCOPIC EXAMINATION: YES
MONOCYTES ABSOLUTE: 1.2 K/UL (ref 0–1.3)
MONOCYTES RELATIVE PERCENT: 5 %
MUCUS: ABNORMAL /LPF
NEUTROPHILS ABSOLUTE: 20.9 K/UL (ref 1.7–7.7)
NEUTROPHILS RELATIVE PERCENT: 88.2 %
NITRITE, URINE: NEGATIVE
PDW BLD-RTO: 31 % (ref 12.4–15.4)
PH UA: 5.5 (ref 5–8)
PLATELET # BLD: 787 K/UL (ref 135–450)
PMV BLD AUTO: 7.6 FL (ref 5–10.5)
PROTEIN UA: 30 MG/DL
RBC # BLD: 5.34 M/UL (ref 4–5.2)
RBC UA: ABNORMAL /HPF (ref 0–4)
SPECIFIC GRAVITY UA: >=1.03 (ref 1–1.03)
URINE REFLEX TO CULTURE: YES
URINE TYPE: ABNORMAL
UROBILINOGEN, URINE: 0.2 E.U./DL
WBC # BLD: 23.7 K/UL (ref 4–11)
WBC UA: ABNORMAL /HPF (ref 0–5)

## 2023-01-12 PROCEDURE — 83540 ASSAY OF IRON: CPT

## 2023-01-12 PROCEDURE — 87077 CULTURE AEROBIC IDENTIFY: CPT

## 2023-01-12 PROCEDURE — 87186 SC STD MICRODIL/AGAR DIL: CPT

## 2023-01-12 PROCEDURE — 38505 NEEDLE BIOPSY LYMPH NODES: CPT | Performed by: NURSE PRACTITIONER

## 2023-01-12 PROCEDURE — 88305 TISSUE EXAM BY PATHOLOGIST: CPT

## 2023-01-12 PROCEDURE — 83550 IRON BINDING TEST: CPT

## 2023-01-12 PROCEDURE — 81001 URINALYSIS AUTO W/SCOPE: CPT

## 2023-01-12 PROCEDURE — 2580000003 HC RX 258: Performed by: INTERNAL MEDICINE

## 2023-01-12 PROCEDURE — 6360000002 HC RX W HCPCS: Performed by: RADIOLOGY

## 2023-01-12 PROCEDURE — 2500000003 HC RX 250 WO HCPCS: Performed by: RADIOLOGY

## 2023-01-12 PROCEDURE — 6370000000 HC RX 637 (ALT 250 FOR IP): Performed by: INTERNAL MEDICINE

## 2023-01-12 PROCEDURE — 82728 ASSAY OF FERRITIN: CPT

## 2023-01-12 PROCEDURE — 2709999900 CT BIOPSY LYMPH NODES SUPERFICIAL

## 2023-01-12 PROCEDURE — 6360000002 HC RX W HCPCS: Performed by: INTERNAL MEDICINE

## 2023-01-12 PROCEDURE — 88342 IMHCHEM/IMCYTCHM 1ST ANTB: CPT

## 2023-01-12 PROCEDURE — 6360000002 HC RX W HCPCS: Performed by: HOSPITALIST

## 2023-01-12 PROCEDURE — 88341 IMHCHEM/IMCYTCHM EA ADD ANTB: CPT

## 2023-01-12 PROCEDURE — 87086 URINE CULTURE/COLONY COUNT: CPT

## 2023-01-12 PROCEDURE — 85025 COMPLETE CBC W/AUTO DIFF WBC: CPT

## 2023-01-12 PROCEDURE — 85610 PROTHROMBIN TIME: CPT

## 2023-01-12 RX ORDER — FENTANYL CITRATE 50 UG/ML
INJECTION, SOLUTION INTRAMUSCULAR; INTRAVENOUS
Status: COMPLETED | OUTPATIENT
Start: 2023-01-12 | End: 2023-01-12

## 2023-01-12 RX ORDER — MIDAZOLAM HYDROCHLORIDE 1 MG/ML
INJECTION INTRAMUSCULAR; INTRAVENOUS
Status: COMPLETED | OUTPATIENT
Start: 2023-01-12 | End: 2023-01-12

## 2023-01-12 RX ORDER — ONDANSETRON 4 MG/1
4 TABLET, ORALLY DISINTEGRATING ORAL 3 TIMES DAILY PRN
Qty: 21 TABLET | Refills: 0 | Status: SHIPPED | OUTPATIENT
Start: 2023-01-12

## 2023-01-12 RX ORDER — DOCUSATE SODIUM 100 MG/1
100 CAPSULE, LIQUID FILLED ORAL 2 TIMES DAILY PRN
Qty: 60 CAPSULE | Refills: 0 | Status: SHIPPED | OUTPATIENT
Start: 2023-01-12

## 2023-01-12 RX ORDER — HEPARIN SODIUM (PORCINE) LOCK FLUSH IV SOLN 100 UNIT/ML 100 UNIT/ML
100 SOLUTION INTRAVENOUS PRN
Status: DISCONTINUED | OUTPATIENT
Start: 2023-01-12 | End: 2023-01-12 | Stop reason: HOSPADM

## 2023-01-12 RX ORDER — LEVOFLOXACIN 500 MG/1
500 TABLET, FILM COATED ORAL DAILY
Qty: 7 TABLET | Refills: 0 | Status: SHIPPED | OUTPATIENT
Start: 2023-01-12 | End: 2023-01-19

## 2023-01-12 RX ORDER — LIDOCAINE HYDROCHLORIDE 10 MG/ML
INJECTION, SOLUTION EPIDURAL; INFILTRATION; INTRACAUDAL; PERINEURAL
Status: COMPLETED | OUTPATIENT
Start: 2023-01-12 | End: 2023-01-12

## 2023-01-12 RX ORDER — OXYCODONE HYDROCHLORIDE AND ACETAMINOPHEN 5; 325 MG/1; MG/1
1 TABLET ORAL EVERY 6 HOURS PRN
Qty: 28 TABLET | Refills: 0 | Status: SHIPPED | OUTPATIENT
Start: 2023-01-12 | End: 2023-01-19

## 2023-01-12 RX ADMIN — ONDANSETRON 4 MG: 2 INJECTION INTRAMUSCULAR; INTRAVENOUS at 03:15

## 2023-01-12 RX ADMIN — SODIUM CHLORIDE, POTASSIUM CHLORIDE, SODIUM LACTATE AND CALCIUM CHLORIDE: 600; 310; 30; 20 INJECTION, SOLUTION INTRAVENOUS at 11:48

## 2023-01-12 RX ADMIN — MIDAZOLAM HYDROCHLORIDE 0.5 MG: 2 INJECTION, SOLUTION INTRAMUSCULAR; INTRAVENOUS at 10:14

## 2023-01-12 RX ADMIN — SODIUM CHLORIDE, PRESERVATIVE FREE 10 ML: 5 INJECTION INTRAVENOUS at 08:54

## 2023-01-12 RX ADMIN — MIDAZOLAM HYDROCHLORIDE 0.5 MG: 2 INJECTION, SOLUTION INTRAMUSCULAR; INTRAVENOUS at 10:06

## 2023-01-12 RX ADMIN — HYDROMORPHONE HYDROCHLORIDE 0.5 MG: 1 INJECTION, SOLUTION INTRAMUSCULAR; INTRAVENOUS; SUBCUTANEOUS at 03:09

## 2023-01-12 RX ADMIN — FENTANYL CITRATE 25 MCG: 50 INJECTION, SOLUTION INTRAMUSCULAR; INTRAVENOUS at 10:06

## 2023-01-12 RX ADMIN — HYDROMORPHONE HYDROCHLORIDE 0.5 MG: 1 INJECTION, SOLUTION INTRAMUSCULAR; INTRAVENOUS; SUBCUTANEOUS at 08:48

## 2023-01-12 RX ADMIN — HYDROMORPHONE HYDROCHLORIDE 0.5 MG: 1 INJECTION, SOLUTION INTRAMUSCULAR; INTRAVENOUS; SUBCUTANEOUS at 11:53

## 2023-01-12 RX ADMIN — ALUMINUM HYDROXIDE, MAGNESIUM HYDROXIDE, AND SIMETHICONE 30 ML: 200; 200; 20 SUSPENSION ORAL at 11:59

## 2023-01-12 RX ADMIN — Medication 100 UNITS: at 16:44

## 2023-01-12 RX ADMIN — FENTANYL CITRATE 25 MCG: 50 INJECTION, SOLUTION INTRAMUSCULAR; INTRAVENOUS at 10:14

## 2023-01-12 RX ADMIN — LIDOCAINE HYDROCHLORIDE 10 ML: 10 INJECTION, SOLUTION EPIDURAL; INFILTRATION; INTRACAUDAL; PERINEURAL at 10:10

## 2023-01-12 RX ADMIN — KETOROLAC TROMETHAMINE 15 MG: 15 INJECTION, SOLUTION INTRAMUSCULAR; INTRAVENOUS at 13:21

## 2023-01-12 ASSESSMENT — PAIN DESCRIPTION - DESCRIPTORS
DESCRIPTORS: ACHING;PRESSURE
DESCRIPTORS: CRUSHING;DISCOMFORT

## 2023-01-12 ASSESSMENT — PAIN SCALES - GENERAL
PAINLEVEL_OUTOF10: 0
PAINLEVEL_OUTOF10: 7

## 2023-01-12 ASSESSMENT — PAIN DESCRIPTION - PAIN TYPE: TYPE: ACUTE PAIN

## 2023-01-12 ASSESSMENT — PAIN - FUNCTIONAL ASSESSMENT
PAIN_FUNCTIONAL_ASSESSMENT: NONE - DENIES PAIN
PAIN_FUNCTIONAL_ASSESSMENT: PREVENTS OR INTERFERES SOME ACTIVE ACTIVITIES AND ADLS

## 2023-01-12 ASSESSMENT — PAIN DESCRIPTION - LOCATION
LOCATION: ABDOMEN

## 2023-01-12 ASSESSMENT — PAIN DESCRIPTION - FREQUENCY
FREQUENCY: CONTINUOUS
FREQUENCY: CONTINUOUS

## 2023-01-12 ASSESSMENT — PAIN DESCRIPTION - ONSET: ONSET: ON-GOING

## 2023-01-12 ASSESSMENT — PAIN DESCRIPTION - ORIENTATION
ORIENTATION: MID
ORIENTATION: RIGHT;MID
ORIENTATION: MID

## 2023-01-12 NOTE — CARE COORDINATION
CM following:   CM met with pt at bedside. CM attempted to discuss discharge plans concerning [address] services. Pt reported being in too much pain currently to think about discharging and [address] services. Pt is open to [address], but would like to wait to revisit this conversation until closer to discharge. Critical access hospital has accepted the pt for [address] services per documentation. CM to follow up with pt at discharge.   Electronically signed by CHARLI Wilson on 1/12/2023 at 12:22 PM  [phone]

## 2023-01-12 NOTE — DISCHARGE SUMMARY
Hospital Discharge Summary    Patient's PCP: Pcp No  Admit Date: 1/10/2023   Discharge Date: 1/12/2023    Admitting Physician: Dr. Camille Olszewski, MD  Discharge Physician: Dr. Mima Benson MD   Consults: hematology/oncology    Brief HPI:   57 y.o. female with newly diagnosed ovarian neoplasm and associated ascites status post paracentesis the day before presentation who presented to [facility] with increased generalized abdominal pain. Patient describes the pain is crampy, constant worse with movement, without any fevers, chills, nausea, vomiting, diarrhea, melena, hematochezia or hematemesis, dysuria, or frequency, or urgency or hematuria. She also reports right lower extremity weakness for the past week  Brief hospital course:     Evaluated and treated for the following :    -large ovarian mass, Peritoneal carcinomatosis with ascites, pleural effusions likely metastatic ovarian cancer. Юлия Min Await cytology from paracentesis done at [facility]  S/p CT-guided biopsy of ovarian mass 1/12--follow-up with nephrology outpatient  . Юлия Min Patient to follow-up with oncology outpatient     -Acute on chronic abdominal pain/discomfort related to peritoneal carcinomatosis and paracentesis--currently improved to baseline--continue pain regimen     -Reactive leukocytosis and thrombocytosis/microcytic anemia--appears to be chronic without evidence of infection-iron studies [id number] with oncology    -suspected UTI-abnormal UA-culture pending-dc'd on levaquin     -Generalized weakness/fatigue due to malignancy--  PT and OT recommending home health at discharge     -Obesity with BMI 34       Invasive procedures:  See above    Discharge Diagnoses: Active Problems:    Failure to thrive in adult    Abdominal pain    Ascites    Microcytic anemia  Resolved Problems:    * No resolved hospital problems.  *      Physical Exam: BP (!) 142/102   Pulse 88   Temp (!) 96.4 °F (35.8 °C) (Oral)   Resp 18   Ht 5' 3\" (1.6 m)   Wt 196 lb (88.9 kg)   SpO2 91%   BMI 34.72 kg/m²   Gen/overall appearance: Not in acute distress. Alert. Head: Normocephalic, atraumatic  Eyes: EOMI, good acuity  ENT:- Oral mucosa moist  Neck: No JVD, thyromegaly  CVS: Nml S1S2, no MRG, RRR  Pulm: Clear bilaterally. No crackles/wheezes  Gastrointestinal: Soft, NT/ND, +BS  Musculoskeletal: No edema. Warm  Neuro: No focal deficit. Moves extremity spontaneously. Psychiatry: Appropriate affect. Not agitated. Skin: Warm, dry with normal turgor. No rash        Significant Diagnostic Studies:    See above      Treatments: As above. Discharge Medications:     Medication List        START taking these medications      docusate sodium 100 MG capsule  Commonly known as: COLACE  Take 1 capsule by mouth 2 times daily as needed for Constipation     levoFLOXacin 500 MG tablet  Commonly known as: LEVAQUIN  Take 1 tablet by mouth daily for 7 days     ondansetron 4 MG disintegrating tablet  Commonly known as: ZOFRAN-ODT  Take 1 tablet by mouth 3 times daily as needed for Nausea or Vomiting     oxyCODONE-acetaminophen 5-325 MG per tablet  Commonly known as: Percocet  Take 1 tablet by mouth every 6 hours as needed for Pain for up to 7 days. Intended supply: 3 days. Take lowest dose possible to manage pain Max Daily Amount: 4 tablets               Where to Get Your Medications        These medications were sent to [address], [address] [facility]  [address] [facility] [address]      Phone: [phone]   levoFLOXacin 500 MG tablet       These medications were sent to [place], [facility] [address] [facility] [address]. Clarissa Victoria [phone] - F [phone]  [address] [facility] [address]      Phone: [phone]   docusate sodium 100 MG capsule  ondansetron 4 MG disintegrating tablet  oxyCODONE-acetaminophen 5-325 MG per tablet         Activity: activity as tolerated  Diet: ADULT DIET;  Regular Disposition: home  Discharged Condition: Stable  Follow Up:   Nighat Parr MD  [address] [facility], [address] [place] [address]  [phone]    Schedule an appointment as soon as possible for a visit in 1 week(s)        Code status:  Full Code         Total time spent on discharge, finalizing medications, referrals and arranging outpatient follow up was more than 45 minutes      Thank you Dr. Ryan Traylor for the opportunity to be involved in this patients care.

## 2023-01-12 NOTE — PROGRESS NOTES
Pt with ongoing nausea this evening, and NPO at 0000. MD Kang notified, orders for IV phenergan and LR placed. See new orders, see eMAR.

## 2023-01-12 NOTE — PLAN OF CARE
Problem: Safety - Adult  Goal: Free from fall injury  Outcome: Progressing  Note: Pt is a High fall risk. See Terenceald Median Fall Score and ABCDS Injury Risk assessments.   + Screening for Orthostasis and/or + High Fall Risk per JONES/ABCDS: Explained fall risk precautions to pt and family and rationale behind their use to keep the patient safe. Pt bed is in low position, side rails up, call light and belongings are in reach. Fall wristband applied and present on pts wrist.  Bed alarm on. Pt encouraged to call for assistance. Will continue with hourly rounds for PO intake, pain needs, toileting and repositioning as needed. Problem: Skin/Tissue Integrity - Adult  Goal: Skin integrity remains intact  Outcome: Progressing  Note: Pt with no skin breakdown noted this shift. Problem: Pain  Goal: Verbalizes/displays adequate comfort level or baseline comfort level  Outcome: Progressing  Note: Pt with ongoing pain. PRN medications, pt satisfied. Pt continues with ongoing nausea, PRN medications given, see eMAR.

## 2023-01-12 NOTE — CARE COORDINATION
Case Management Assessment            Discharge Note                    Date / Time of Note: 1/12/2023 3:51 PM                  Discharge Note Completed by: CHARLI Vaughan    Patient Name: Akua Stephenson   YOB: 1960  Diagnosis: Failure to thrive in adult [R62.7]  Abdominal pain [R10.9]   Date / Time: 1/10/2023  9:13 PM    Current PCP: Pcp No  Clinic patient: No    Hospitalization in the last 30 days: No    Advance Directives:  Code Status: Full Code  [place] DNR form completed and on chart: Not Indicated    Financial:  Payor: [employer] / Plan: [employer] LEE - EXCHANGE / Product Type: *No Product type* /      Pharmacy:  No Pharmacies [address] medications?:    Assistance provided by Case Management: None at this time    Does patient want to participate in local refill/ meds to beds program?:      Meds To Beds General Rules:  1. Can ONLY be done Monday- Friday between 8:30am-5pm  2. Prescription(s) must be in pharmacy by 3pm to be filled same day  3. Copy of patient's insurance/ prescription drug card and patient face sheet must be sent along with the prescription(s)  4. Cost of Rx cannot be added to hospital bill. If financial assistance is needed, please contact unit  or ;  or  CANNOT provide pharmacy voucher for patients co-pays  5.  Patients can then  the prescription on their way out of the hospital at discharge, or pharmacy can deliver to the bedside if staff is available. (payment due at time of pick-up or delivery - cash, check, or card accepted)     Able to afford home medications/ co-pay costs: Yes    ADLS:  Current PT AM-PAC Score: 20 /24  Current OT AM-PAC Score: 20 /24      DISCHARGE Disposition: Home- No Services Needed    LOC at discharge: Not Applicable  BARON Completed: Not Indicated    Notification completed in HENS/PAS?:  Not Applicable    IMM Completed:   Not Indicated    Transportation:  Transportation PLAN for discharge: friend   Mode of Transport: Private Car  Reason for medical transport: Not Applicable  Name of [address] [place] [address],[address]: Not Applicable  Time of Transport: n/a    Transport form completed: Not Indicated    Home Care:  [address] ordered at discharge: No - pt declined  [address]: Not Applicable  Orders faxed: No    Referrals made at [facility] for outpatient continued care:  Not Applicable    Additional CM Notes: CM met with pt at bedside. Pt is from home independent pta and will return home at discharge. Pt has declined João  services at this time. Pt plans to follow up with a primary care provider in [facility]. Pt reports this provider is with [facility] through [address] notes that [facility]) has a primary provider office in [facility] and not [facility]. Pt reports her friend Olga Lentz will provide transportation home and she will follow up with appropriate providers outpatient. Pt reports no HHC, DME or CM needs at discharge. The Plan for Transition of Care is related to the following treatment goals of Failure to thrive in adult [R62.7]  Abdominal pain [R10.9]    The Patient and/or patient representative Judith and her family were provided with a choice of provider and agrees with the discharge plan Yes    Freedom of choice list was provided with basic dialogue that supports the patient's individualized plan of care/goals and shares the quality data associated with the providers.  Yes    Care Transitions patient: No    CHARLI Sanchez  [facility] [employer]  Case Management Department  Ph: [phone]  Fax: [phone]

## 2023-01-12 NOTE — PROGRESS NOTES
Physical Therapy    Attempted to see pt for PT. Pt found supine. Declines OOB at this time. Reports \"I'm trying to get my pain to settle down. \"  Declined any activity despite encouragement. Encouraged pt to get up and ambulate/sit up in chair later today if able. Pt verbalized understanding.   Will cont per [address], PT 4745

## 2023-01-12 NOTE — DISCHARGE INSTR - COC
Continuity of Care Form    Patient Name: Keith Saravia   :  1960  MRN:  [id number]    Admit date:  1/10/2023  Discharge date:  ***    Code Status Order: Full Code   Advance Directives:     Admitting Physician:  Sid Min MD  PCP: Pcp No    Discharging Nurse: [facility] Unit/Room#: 7528/1695-26  Discharging Unit Phone Number: ***    Emergency Contact:   Extended Emergency Contact Information  Primary Emergency Contact: paul martines  Home Phone: [phone]  Relation: Girlfriend    Past Surgical History:  No past surgical history on file. Immunization History:   Immunization History   Administered Date(s) Administered    Influenza Virus Vaccine 2011       Active Problems:  Patient Active Problem List   Diagnosis Code    Failure to thrive in adult R62.7    Abdominal pain R10.9    Ascites R18.8    Microcytic anemia D50.9       Isolation/Infection:   Isolation            No Isolation          Patient Infection Status       None to display            Nurse Assessment:  Last Vital Signs: BP (!) 155/94   Pulse (!) 103   Temp 98.2 °F (36.8 °C) (Oral)   Resp 16   Ht 5' 3\" (1.6 m)   Wt 196 lb (88.9 kg)   SpO2 95%   BMI 34.72 kg/m²     Last documented pain score (0-10 scale): Pain Level:  (Eyes closed, RR>10)  Last Weight:   Wt Readings from Last 1 Encounters:   23 196 lb (88.9 kg)     Mental Status:  {IP PT MENTAL STATUS:83274}    IV Access:  { BARON IV ACCESS:037366210}    Nursing Mobility/ADLs:  Walking   {CHP DME JDKP:026197984}  Transfer  {CHP DME PQGD:860929731}  Bathing  {CHP DME BTRH:768917025}  Dressing  {CHP DME ZXZD:931086123}  Toileting  {CHP DME DBTZ:780460638}  Feeding  {CHP DME NLRA:287808070}  Med Admin  {CHP DME MVYL:368242734}  Med Delivery   { BARON MED Delivery:129548718}    Wound Care Documentation and Therapy:        Elimination:  Continence:    Bowel: {YES / KH:64303}  Bladder: {YES / MT:77878}  Urinary Catheter: {Urinary Catheter:124233857} Colostomy/Ileostomy/Ileal Conduit: {YES / VK:20861}       Date of Last BM: ***    Intake/Output Summary (Last 24 hours) at 2023 1457  Last data filed at 2023 1332  Gross per 24 hour   Intake 600 ml   Output 650 ml   Net -50 ml     I/O last 3 completed shifts: In: 1080 [P.O.:1080]  Out: 700 [Urine:350; Emesis/NG output:350]    Safety Concerns:     [employer] Safety Concerns:793077581}    Impairments/Disabilities:      [address] [employer] Impairments/Disabilities:011032868}    Nutrition Therapy:  Current Nutrition Therapy:   [employer] Diet List:111261940}    Routes of Feeding: {CHP DME Other Feedings:152407340}  Liquids: {Slp liquid thickness:47202}  Daily Fluid Restriction: {CHP DME Yes amt example:908586473}  Last Modified Barium Swallow with Video (Video Swallowing Test): {Done Not Done JWK:810106019}    Treatments at the Time of Hospital Discharge:   Respiratory Treatments: ***  Oxygen Therapy:  {Therapy; copd oxygen:20619}  Ventilator:    { CC Vent KLXM:514537995}    Rehab Therapies: {THERAPEUTIC INTERVENTION:2297815119}  Weight Bearing Status/Restrictions: [employer]  Weight Bearin}  Other Medical Equipment (for information only, NOT a DME order):  {EQUIPMENT:886767209}  Other Treatments: ***    Patient's personal belongings (please select all that are sent with patient):  {[facility] DME Belongings:297053122}    RN SIGNATURE:  {Esignature:648369517}    CASE MANAGEMENT/SOCIAL WORK SECTION    Inpatient Status Date: ***    Readmission Risk Assessment Score:  Readmission Risk              Risk of Unplanned Readmission:  8           Discharging to Facility/ Agency   Name:   Address:  Phone:  Fax:    Dialysis Facility (if applicable)   Name:  Address:  Dialysis Schedule:  Phone:  Fax:    / signature: {Esignature:521730179}    PHYSICIAN SECTION    Prognosis: Guarded    Condition at Discharge: Stable    Rehab Potential (if transferring to Rehab):  Fair    Recommended Labs or Other Treatments After Discharge:     Physician Certification: I certify the above information and transfer of Rossy Redman  is necessary for the continuing treatment of the diagnosis listed and that she requires [address] for less 30 days.      Update Admission H&P: No change in H&P    PHYSICIAN SIGNATURE:  Electronically signed by Tata Conway MD on 1/12/23 at 2:58 PM EST

## 2023-01-12 NOTE — PLAN OF CARE
Problem: Safety - Adult  Goal: Free from fall injury  Outcome: Progressing  Flowsheets (Taken 1/11/2023 1955)  Free From Fall Injury:   Instruct family/caregiver on patient safety   Based on caregiver fall risk screen, instruct family/caregiver to ask for assistance with transferring infant if caregiver noted to have fall risk factors     Problem: ABCDS Injury Assessment  Goal: Absence of physical injury  Flowsheets (Taken 1/11/2023 1955)  Absence of Physical Injury: Implement safety measures based on patient assessment     Problem: Hematologic - Adult  Goal: Maintains hematologic stability  Flowsheets (Taken 1/11/2023 1955)  Maintains hematologic stability:   Assess for signs and symptoms of bleeding or hemorrhage   Monitor labs for bleeding or clotting disorders   Administer blood products/factors as ordered     Problem: Pain  Goal: Verbalizes/displays adequate comfort level or baseline comfort level  Flowsheets (Taken 1/11/2023 1955)  Verbalizes/displays adequate comfort level or baseline comfort level:   Encourage patient to monitor pain and request assistance   Administer analgesics based on type and severity of pain and evaluate response   Consider cultural and social influences on pain and pain management   Assess pain using appropriate pain scale   Implement non-pharmacological measures as appropriate and evaluate response   Notify Licensed Independent Practitioner if interventions unsuccessful or patient reports new pain

## 2023-01-12 NOTE — PROGRESS NOTES
ONCOLOGY HEMATOLOGY CARE PROGRESS NOTE      SUBJECTIVE:    Judith off the floor and to IR during my visit today         OBJECTIVE        Physical    VITALS:  Patient Vitals for the past 24 hrs:   BP Temp Temp src Pulse Resp SpO2 Height Weight   01/12/23 0309 (!) 149/95 97.6 °F (36.4 °C) Oral (!) 101 18 93 % -- --   01/11/23 2302 (!) 151/101 97.5 °F (36.4 °C) Oral 98 17 94 % -- --   01/11/23 1947 (!) 141/94 97.8 °F (36.6 °C) Oral 97 16 94 % -- --   01/11/23 1615 -- -- -- -- 16 -- -- --   01/11/23 1545 -- -- -- -- 18 -- -- --   01/11/23 1538 (!) 164/99 -- -- -- -- -- -- --   01/11/23 1531 (!) 158/102 97.9 °F (36.6 °C) Oral 93 -- 94 % -- --   01/11/23 1253 -- -- -- -- 16 -- -- --   01/11/23 1215 136/86 98.2 °F (36.8 °C) Oral 100 16 95 % -- --   01/11/23 0851 (!) 151/104 97.9 °F (36.6 °C) Oral 95 18 -- 5' 3\" (1.6 m) 196 lb (88.9 kg)       24HR INTAKE/OUTPUT:    Intake/Output Summary (Last 24 hours) at 1/12/2023 0715  Last data filed at 1/12/2023 0511  Gross per 24 hour   Intake 720 ml   Output 700 ml   Net 20 ml         DATA:  CBC:    Recent Labs     01/10/23  2337   WBC 18.6*   NEUTROABS 16.2*   LYMPHOPCT 8.0   RBC 5.29*   HGB 10.9*   HCT 37.0   MCV 70.1*   MCH 20.6*   MCHC 29.4*   RDW 31.7*   *       PT/INR:    Recent Labs     01/10/23  2337   PROT 6.6     PTT:  No results for input(s): APTT in the last 720 hours. CMP:    Recent Labs     01/10/23  2337      K 4.7      CO2 27   GLUCOSE 130*   BUN 31*   CREATININE 1.2   LABGLOM 50*   CALCIUM 9.2   PROT 6.6   LABALBU 3.0*   AGRATIO 0.8*   BILITOT <0.2   ALKPHOS 153*   ALT 6*   AST 12*       Lab Results   Component Value Date    CALCIUM 9.2 01/10/2023       LDH:No results for input(s): LDH in the last 720 hours. Radiology Review:  CT HEAD WO CONTRAST  Narrative: CT HEAD WO CONTRAST    INDICATION: Right lower extremity weakness    COMPARISON: None.     TECHNIQUE:  CT head was performed without intravenous contrast. Coronal and sagittal reformations were created. Up-to-date CT equipment and radiation dose reduction techniques were employed. FINDINGS:    No acute intracranial hemorrhage or extra-axial fluid collection. No mass effect or midline shift. Gray-white matter differentiation is maintained. Ventricles and sulci are appropriate for the patient's age. The basal cisterns are patent. The calvarium is intact. Visualized paranasal sinuses and mastoid air cells are clear. Impression: No acute intracranial hemorrhage or mass effect. Problem List  Patient Active Problem List   Diagnosis    Failure to thrive in adult    Abdominal pain    Ascites    Microcytic anemia       ASSESSMENT AND PLAN:    1. Complex cystic mass measuring approximately 10.1 x 12.4 x 13.6 cm in the pelvis,likely ovarian.     -Reviewed the imaging studies which revealed multiple omental lesions, right pelvic lymphadenopathy. The largest iliac lymph node noted was approximately 3 x 1.8 cm. CT scan of the chest was positive for moderate left-sided pleural effusion and small right-sided pleural effusion with no evidence of intrapulmonary or hilar/mediastinal lymphadenopathy.   -Serum  on 1/6/2023 was elevated to  -1141.2, serum ferritin of 171.5.  -The above clinical and neurological findings, biochemical findings are highly suspicious of ovarian malignancy. - IR biopsy  LN today 1/12/23  - we await tissue diagnosis      2. Hypertension  The blood pressure has been noted to be elevated. -Management per primary team.     3. Microcytic anemia  Repeat CBC today  Check iron studies     ONCOLOGIC DISPOSITION:    Patient can be discharged after the biopsy if okay with the primary team.  To follow-up as an outpatient at the [facility] office approximately 1 week after the biopsy to discuss the test results and further management plan.     EMILI Ramsay - CNP  Please contact through [facility]

## 2023-01-12 NOTE — SEDATION DOCUMENTATION
IMAGING SERVICES NURSING PROGRESS NOTE    Procedure:  Lymph node bx  January 12, 2023  Dylan Pineda      Allergies: Allergies   Allergen Reactions    Azithromycin     Amoxicillin Rash       Vitals:    01/12/23 1022   BP: 133/80   Pulse: (!) 103   Resp: 15   Temp:    SpO2: 95%       Recent lab work reviewed with MD: yes   Procedure explained to patient by MD: yes   Informed consent obtained:yes  Family with patient: Yes    Mental Status:  Normal  Readiness to learn:  Yes  Barriers to learning: No    Pain Assessment Pre-Procedure:  Pain Present:  no  Pain Score:  0  Pain Quality/Description:  none    Time out Procedure Verification with:  [x] RN  [x] Physician  [x] Patient  [x] Other: CT Technologist  Procedure site marked, if applicable:  Yes    Note: Patient arrived A & O x 4, denies pain, breathing easily on room air, Spoke to Dr. Patricia Robles prior to procedure. Procedural sedation:  Fentanyl:  50 mcg  Versed:    1 mg  Post Procedureal Note:  Patient tolerated procedure well. Breathing easily on room air. Report given to [address] RN. Patient transported in stable conditon to room 3520.     Pain Assessment Post-Procedure:  Pain Present:  no  Pain Score:  0  Pain Quality/Description:  none    Plan of Care Goals:  Safety measures met:  Yes  Patient understands explanation of procedure:  Yes    Time in:  1006  Time out:  [facility]  RKrisNKris 1/12/2023

## 2023-01-13 LAB
INR BLD: 1.6 (ref 0.88–1.12)
IRON SATURATION: 14 % (ref 15–50)
IRON: 24 UG/DL (ref 37–145)
TOTAL IRON BINDING CAPACITY: 174 UG/DL (ref 260–445)

## 2023-01-14 PROBLEM — R11.2 NAUSEA AND VOMITING: Status: ACTIVE | Noted: 2023-01-14

## 2023-01-14 LAB
ORGANISM: ABNORMAL
URINE CULTURE, ROUTINE: ABNORMAL

## 2023-01-14 NOTE — ED PROVIDER NOTES
[address] [facility]          EM RESIDENT NOTE       Date of evaluation: 1/14/2023    Chief Complaint     Emesis (Since Thursday night, has mass in abdomen that needs to be bx, admitted last week, c/o weakness)      History of Present Illness     Judith Conrad is a 57 y.o. female with recently diagnosed ovarian mass and peritoneal carcinomatosis, pending pathology, presenting for evaluation of nausea and vomiting. The patient was admitted to this hospital on 1/10 with increased generalized abdominal pain. Her pain improved with Percocet, but she subsequently struggled with nausea and vomiting. This finally improved enough that she was felt safe for discharge, and she was sent home with Zofran and Percocet, as well as Levaquin for UTI, on 1/12. Today, the patient reports that since discharge, she has been experiencing continued severe nausea and vomiting. She has been unable to keep any medications down, with resultant worsening of her abdominal pain. She is still passing gas, and reports passing stool two days ago, but feels that she \"has nothing in there to pass\" now. She denies hematemesis, fevers, chills, shortness of breath, and chest pain. MEDICAL DECISION MAKING / ASSESSMENT / PLAN     INITIAL VITALS: BP: (!) 141/97, Temp: 97.5 °F (36.4 °C), Heart Rate: (!) 120, Resp: 18, SpO2: 91 %    Judith Conrad is a 57 y.o. female with recently diagnosed ovarian mass and peritoneal carcinomatosis, now with persistent nausea and vomiting. She was admitted with these symptoms several days ago, and was discharged home with Zofran, but has failed outpatient management. I suspect that her nausea, vomiting, and abdominal pain are all secondary to her carcinomatosis. I did consider infectious cause, but she has no fevers or other systemic symptoms to suggest this. I also considered obstruction.  Fortunately, she is still passing gas, and her KUB does not appear consistent with this diagnosis. At this time, having failed outpatient management, I believe she would benefit from admission for IV fluids and pain control, and hopefully for initiation of some oncologic treatment, as I suspect these symptoms will persist until her cancer burden decreases. I spoke with the hospitalist, who graciously accepted her to his service. Medical Decision Making  Amount and/or Complexity of Data Reviewed  Labs: ordered. Radiology: ordered. Risk  Prescription drug management. Decision regarding hospitalization. This patient was also evaluated by the attending physician. All care plans werediscussed and agreed upon. Clinical Impression     1. Nausea and vomiting, unspecified vomiting type        Disposition     PATIENT REFERRED TO:  No follow-up provider specified. DISCHARGE MEDICATIONS:  New Prescriptions    No medications on file       DISPOSITION Admitted 01/14/2023 12:47:47 PM        Diagnostic Results and Other Data     RADIOLOGY:  XR ABDOMEN (KUB) (SINGLE AP VIEW)   Final Result   1. Nonspecific bowel gas pattern suggestive for mild degree of ileus   2.  At least moderate ostial arthritis right hip          LABS:   Results for orders placed or performed during the hospital encounter of 01/14/23   COVID-19, Rapid    Specimen: Nasopharyngeal Swab   Result Value Ref Range    SARS-CoV-2, NAAT Not Detected Not Detected   BMP w/ Reflex to MG   Result Value Ref Range    Sodium 135 (L) 136 - 145 mmol/L    Potassium reflex Magnesium 4.8 3.5 - 5.1 mmol/L    Chloride 91 (L) 99 - 110 mmol/L    CO2 29 21 - 32 mmol/L    Anion Gap 15 3 - 16    Glucose 129 (H) 70 - 99 mg/dL    BUN 61 (H) 7 - 20 mg/dL    Creatinine 1.4 (H) 0.6 - 1.2 mg/dL    Est, Glom Filt Rate 42 (A) >60    Calcium 9.6 8.3 - 10.6 mg/dL   CBC with Auto Differential   Result Value Ref Range    WBC 17.7 (H) 4.0 - 11.0 K/uL    RBC 5.68 (H) 4.00 - 5.20 M/uL    Hemoglobin 12.0 12.0 - 16.0 g/dL    Hematocrit 40.8 36.0 - 48.0 %    MCV 71.8 (L) 80.0 - 100.0 fL    MCH 21.2 (L) 26.0 - 34.0 pg    MCHC 29.5 (L) 31.0 - 36.0 g/dL    RDW 31.3 (H) 12.4 - 15.4 %    Platelets 919 (H) 934 - 450 K/uL    MPV 7.9 5.0 - 10.5 fL    Neutrophils % 74.0 %    Lymphocytes % 12.0 %    Monocytes % 4.0 %    Eosinophils % 0.0 %    Basophils % 0.0 %    Neutrophils Absolute 14.9 (H) 1.7 - 7.7 K/uL    Lymphocytes Absolute 2.1 1.0 - 5.1 K/uL    Monocytes Absolute 0.7 0.0 - 1.3 K/uL    Eosinophils Absolute 0.0 0.0 - 0.6 K/uL    Basophils Absolute 0.0 0.0 - 0.2 K/uL    Bands Relative 10 (H) 0 - 7 %    Macrocytes 1+ (A)     Microcytes 1+ (A)    Procalcitonin   Result Value Ref Range    Procalcitonin 1.10 (H) 0.00 - 0.15 ng/mL     ED BEDSIDE ULTRASOUND:  No results found. RECENT VITALS:  BP: 127/83, Temp: 97.5 °F (36.4 °C), Heart Rate: (!) 101,Resp: 18, SpO2: 91 %     ED Course     Nursing Notes, Past Medical Hx, Past Surgical Hx, Social Hx, Allergies, and Family Hx were reviewed. The patient was given the following medications:  Orders Placed This Encounter   Medications    lactated ringers bolus    ondansetron (ZOFRAN) injection 4 mg    HYDROmorphone (DILAUDID) injection 0.5 mg       CONSULTS:  IP CONSULT TO HOSPITALIST  IP CONSULT TO ONCOLOGY    Review of Systems     A 10-point Review of Systems was completed, and, unless otherwise noted in HPI, was negative. Past Medical, Surgical, Family, and Social History     She has a past medical history of Ascites and Ovarian neoplasm. She has a past surgical history that includes CT BIOPSY LYMPH NODES SUPERFICIAL (1/12/2023). Her She was adopted. Family history is unknown by patient. She reports that she has never smoked. She has never used smokeless tobacco. She reports current alcohol use. She reports that she does not use drugs.     Medications     Previous Medications    DOCUSATE SODIUM (COLACE) 100 MG CAPSULE    Take 1 capsule by mouth 2 times daily as needed for Constipation    LEVOFLOXACIN (LEVAQUIN) 500 MG TABLET Take 1 tablet by mouth daily for 7 days    ONDANSETRON (ZOFRAN-ODT) 4 MG DISINTEGRATING TABLET    Take 1 tablet by mouth 3 times daily as needed for Nausea or Vomiting    OXYCODONE-ACETAMINOPHEN (PERCOCET) 5-325 MG PER TABLET    Take 1 tablet by mouth every 6 hours as needed for Pain for up to 7 days. Intended supply: 3 days. Take lowest dose possible to manage pain Max Daily Amount: 4 tablets       Allergies     She is allergic to azithromycin and amoxicillin. Physical Exam     INITIAL VITALS: BP: (!) 141/97, Temp: 97.5 °F (36.4 °C), Heart Rate: (!) 120, Resp: 18, SpO2: 91 %   General: Appears unwell, but in no acute distress  Eyes: Pupils reactive. No eye discharge. HENT: Normocephalic and atraumatic. External ears normal. External nose normal. OP clear. MM tacky. Neck: Supple. Cardiac: Regular rate and rhythm. No murmurs, rubs, or gallops. Pulmonary: Non-labored respiration. Clear to auscultation bilaterally. Abdomen: Distended but soft. No tenderness to palpation. Musculoskeletal: No long bone deformity. Vascular: Normal pulses in all extremities. Skin: Warm, dry. No rashes. Extremities: No peripheral edema. Neuro: Alert. Moves all four extremities to command. No focal deficits.         Liu Tang MD  01/14/23 [address] Colette HENRIQUEZ MD  01/14/23 4199

## 2023-01-14 NOTE — CONSULTS
[facility]    Consult Note      Requesting Physician:  Dr Melva Quezada:  78-year-old female with abdominoperitoneal carcinomatosis      HISTORY OF PRESENT ILLNESS:    Ms. Ciro Pallas  is a 57 y.o. female we are seeing in consultation for abdominoperitoneal carcinomatosis. This patient is cared for by my partner, Dr. Porsche Vega. This patient presented abdominal pain. A CT of the abdomen/pelvis on 12/29/2022 showed a large cystic mass in the pelvis with abdominoperitoneal carcinomatosis and moderate ascites. A CT of the chest on 12/31/2022 showed a small to moderate left pleural effusion and a small right pleural effusion. A CA-125 was 1141.2 U/mL on 1/06/2023. The patient underwent a CT-guided biopsy of a left pelvic lymph node 1/12/2023. Pathology is pending. The patient was recently discharged from [place] [id number] on 1/12/2023. Following discharge, she contacted the GYN oncologist on-call on 1/14/2023 with complaints of ongoing abdominal fullness, nausea, vomiting, and abdominal pain. As a consequence, she has been readmitted. Past Medical History:  Past Medical History:   Diagnosis Date    Ascites     Ovarian neoplasm        Past Surgical History:  Past Surgical History:   Procedure Laterality Date    CT BIOPSY LYMPH NODES SUPERFICIAL  1/12/2023    CT BIOPSY LYMPH NODES SUPERFICIAL 1/12/2023 [facility] CT SCAN       Current Medications:  No current facility-administered medications for this encounter. Current Outpatient Medications   Medication Sig Dispense Refill    oxyCODONE-acetaminophen (PERCOCET) 5-325 MG per tablet Take 1 tablet by mouth every 6 hours as needed for Pain for up to 7 days. Intended supply: 3 days.  Take lowest dose possible to manage pain Max Daily Amount: 4 tablets 28 tablet 0    docusate sodium (COLACE) 100 MG capsule Take 1 capsule by mouth 2 times daily as needed for Constipation 60 capsule 0    ondansetron (ZOFRAN-ODT) 4 MG disintegrating tablet Take 1 tablet by mouth 3 times daily as needed for Nausea or Vomiting 21 tablet 0    levoFLOXacin (LEVAQUIN) 500 MG tablet Take 1 tablet by mouth daily for 7 days 7 tablet 0       Allergies: Allergies   Allergen Reactions    Azithromycin     Amoxicillin Rash       Social History:  Social History     Socioeconomic History    Marital status: Single     Spouse name: Not on file    Number of children: Not on file    Years of education: Not on file    Highest education level: Not on file   Occupational History    Not on file   Tobacco Use    Smoking status: Never    Smokeless tobacco: Never   Substance and Sexual Activity    Alcohol use: Yes     Comment: rarely    Drug use: Never    Sexual activity: Not on file   Other Topics Concern    Not on file   Social History Narrative    Not on file     Social Determinants of Health     Financial Resource Strain: Not on file   Food Insecurity: Not on file   Transportation Needs: Not on file   Physical Activity: Not on file   Stress: Not on file   Social Connections: Not on file   Intimate Partner Violence: Not on file   Housing Stability: Not on file          Family History:  Family History   Adopted: Yes   Family history unknown: Yes       REVIEW OF SYSTEMS:      Constitutional: Denies fever, sweats, weight loss  Eyes: No visual changes or diplopia. No scleral icterus. Ent: No headaches, hearing loss or vertigo. No mouth sores or sore throat. Cardiovascular: No chest pain, dyspnea on exertion, palpitations or loss of consciousness. Respiratory: No cough or wheezing, no sputum production. No hemoptysis. Gastrointestinal: No abdominal pain, appetite loss, blood in stools. No change in bowel habits. Genitourinary: No dysuria, trouble voiding, or hematuria. Musculoskeletal: No generalized weakness. No joint complaints.   Integumentary: No rash or pruritus. Neurological: No headache, diplopia. No change in gait, balance, or coordination. No paresthesias. Endocrine: No temperature intolerance. No excessive thirst, fluid intake, urination. Hematologic/lymphatic: No abnormal bruising or ecchymosis, blood clots or swollen lymph nodes. Allergic/immunologic: No nasal congestion or hives. PHYSICAL EXAM:      Vitals:  Patient Vitals for the past 24 hrs:   BP Temp Temp src Pulse Resp SpO2 Height Weight   01/14/23 1309 127/83 -- -- (!) 101 -- 91 % -- --   01/14/23 1308 (!) 143/104 -- -- -- -- 92 % -- --   01/14/23 1120 (!) 141/97 97.5 °F (36.4 °C) Oral (!) 120 18 91 % 5' 3\" (1.6 m) 195 lb (88.5 kg)           CONSTITUTIONAL: awake, alert, cooperative, no apparent distress   EYES: pupils equal, round and reactive to light, sclera clear and conjunctiva normal  ENT: Normocephalic, without obvious abnormality, atraumatic  NECK: supple, symmetrical, no jugular venous distension and no carotid bruits   HEMATOLOGIC/LYMPHATIC: no cervical, supraclavicular or axillary lymphadenopathy   LUNGS: no increased work of breathing and clear to auscultation   CARDIOVASCULAR: regular rate and rhythm, normal S1 and S2, no murmur noted  ABDOMEN: normal bowel sounds x 4, soft, non-distended, non-tender, no masses palpated, no hepatosplenomegaly   MUSCULOSKELETAL: full range of motion noted, tone is normal  NEUROLOGIC: awake, alert, oriented to name, place and time. Motor skills grossly intact. SKIN: Normal skin color, texture, turgor and no jaundice. appears intact   EXTREMITIES: no LE edema       DATA:    PT/INR:    Recent Labs     01/12/23  0945 01/10/23  2337   PROT  --  6.6   INR 1.60*  --      PTT:  No results for input(s): APTT in the last 720 hours. CMP:    Recent Labs     01/14/23  1142 01/10/23  2337   * 140   K 4.8 4.7   CL 91* 100   CO2 29 27   BUN 61* 31*   PROT  --  6.6     Mg:  No results for input(s): MG in the last 720 hours.     Lab Results Component Value Date    CALCIUM 9.6 01/14/2023       CBC:    Recent Labs     01/14/23  1142 01/12/23  1056 01/10/23  2337   WBC 17.7* 23.7* 18.6*   NEUTROABS 14.9* 20.9* 16.2*   LYMPHOPCT 12.0 5.9 8.0   RBC 5.68* 5.34* 5.29*   HGB 12.0 11.1* 10.9*   HCT 40.8 38.3 37.0   MCV 71.8* 71.7* 70.1*   MCH 21.2* 20.8* 20.6*   MCHC 29.5* 28.9* 29.4*   RDW 31.3* 31.0* 31.7*   * 787* 657*        LDH:No results for input(s): LDH in the last 720 hours. Radiology Review:  XR ABDOMEN (KUB) (SINGLE AP VIEW)  Narrative: Reason: Abdominal pain possible obstruction    Supine KUB    Findings:    Air is noted throughout a mildly distended colon. Air is noted also within small bowel loops that are not particularly distended. Joint space narrowing identified involving the superolateral aspect of the right hip. Impression: 1. Nonspecific bowel gas pattern suggestive for mild degree of ileus  2. At least moderate ostial arthritis right hip      Problem List  Patient Active Problem List   Diagnosis    Failure to thrive in adult    Abdominal pain    Ascites    Microcytic anemia    Nausea and vomiting       IMPRESSION/RECOMMENDATIONS:    1.) Abdominoperitoneal carcinomatosis  - Likely ovarian cancer.  - Status post a CT-guided biopsy of a left pelvic lymph node on 1/12/2023. Pathology is pending.  - This disease burden is unlikely to be optimally debulked. She will likely require neoadjuvant chemotherapy. - Already has a port. - Will likely require chemotherapy as an inpatient after pathology is available. 2.) Nausea/vomiting  - Antiemetics for now.    3.) NICO  - IVF. 4.) Nutrition  - Would consider discharge after chemo with home TPN. Thank you for asking me to see the patient.        Mya Armijo MD  Please Contact Through [employer]

## 2023-01-14 NOTE — PROGRESS NOTES
4 Eyes Admission Assessment     I agree as the admission nurse that 2 RN's have performed a thorough Head to Toe Skin Assessment on the patient. ALL assessment sites listed below have been assessed on admission. Areas assessed by both nurses:   [x]   Head, Face, and Ears   [x]   Shoulders, Back, and Chest  [x]   Arms, Elbows, and Hands   [x]   Coccyx, Sacrum, and Ischium  [x]   Legs, Feet, and Heels        Does the Patient have Skin Breakdown?   No         Jl Prevention initiated:  No   Wound Care Orders initiated:  No      [facility] nurse consulted for Pressure Injury (Stage 3,4, Unstageable, DTI, NWPT, and Complex wounds) or Jl score 18 or lower:  No      Nurse 1 eSignature: Electronically signed by Jared Blanco RN on 1/14/23 at 6:03 PM EST    **SHARE this note so that the co-signing nurse is able to place an eSignature**    Nurse 2 eSignature: Electronically signed by Flash Maldonado RN on 1/14/23 at 6:16 PM EST

## 2023-01-14 NOTE — ED PROVIDER NOTES
ED Attending Attestation Note     Date of evaluation: 1/14/2023    This patient was seen by the resident. I have seen and examined the patient, agree with the workup, evaluation, management and diagnosis. The care plan has been discussed. My assessment reveals a 56-year-old female presenting with persistent nausea and vomiting in the setting of a known ovarian mass with peritoneal carcinomatosis. She was recently admitted for abdominal pain, nausea, and vomiting and was discharged on 1/12. Since discharge, she has had ongoing nausea and vomiting that have prevented her from taking her medications. Not had a bowel movement in the last 2 days, but is continuing to pass flatus. On exam, her abdomen was mildly distended but soft and nontender to palpation. Notable for a downtrending leukocytosis, worsening thrombocytopenia, and increase in her creatinine to 1.4 from 1.2.  KUB showed a nonspecific bowel gas pattern suggestive of a mild ileus. She was treated symptomatically in the emergency department with IV fluids, Zofran, and Dilaudid. Given her inability to tolerate PO, we will plan to admit her for IV fluids and pain management.      Cecilia Turk MD  01/14/23 2010       Cecilia Turk MD  01/14/23 2010

## 2023-01-14 NOTE — H&P
Hospital Medicine History & Physical      PCP: Pcp No    Date of Admission: 1/14/2023    Date of Service: Pt seen/examined on 1/14/23 and Admitted to Inpatient with expected LOS greater than two midnights due to medical therapy. Chief Complaint: Intractable nausea and vomiting      History Of Present Illness:      57 y.o. female-year-old female with recently diagnosed ovarian neoplasm with peritoneal, omental metastases with ascites presenting with intractable nausea and vomiting. .. She was recently admitted to this hospital from 1/10-1/12 with abdominal pain following an outpatient paracentesis. .  She underwent a CT-guided biopsy of the ovarian mass on 1/12 and was discharged home. .  She had an abnormal UA and was given levofloxacin empirically at discharge, cultures grew Klebsiella 25K CFUS     Patient has however had chronic abdominal pain with nausea. .  Since her discharge, she is continue to have persistent nausea and vomiting and not able to eat much or take her medications. .  She has had bowel movements, no diarrhea, fevers or chills. .     She had an outpatient PET/CT scan done yesterday at another facility, no results available. .  In the ED, BP was 141/97, pulse 120, respirations 18, temperature 97.5, oxygen saturation 99% on room air. KUB showed evidence of mild ileus    Past Medical History:          Diagnosis Date    Ascites     Ovarian neoplasm        Past Surgical History:          Procedure Laterality Date    CT BIOPSY LYMPH NODES SUPERFICIAL  1/12/2023    CT BIOPSY LYMPH NODES SUPERFICIAL 1/12/2023 [facility] CT SCAN       Medications Prior to Admission:      Prior to Admission medications    Medication Sig Start Date End Date Taking? Authorizing Provider   oxyCODONE-acetaminophen (PERCOCET) 5-325 MG per tablet Take 1 tablet by mouth every 6 hours as needed for Pain for up to 7 days. Intended supply: 3 days.  Take lowest dose possible to manage pain Max Daily Amount: 4 tablets 1/12/23 1/19/23 Robbie Rock MD   docusate sodium (COLACE) 100 MG capsule Take 1 capsule by mouth 2 times daily as needed for Constipation 1/12/23   Robbie Rock MD   ondansetron (ZOFRAN-ODT) 4 MG disintegrating tablet Take 1 tablet by mouth 3 times daily as needed for Nausea or Vomiting 1/12/23   Robbie Rock MD   levoFLOXacin [facility]) 500 MG tablet Take 1 tablet by mouth daily for 7 days 1/12/23 1/19/23  Robbie Rock MD       Allergies:  Azithromycin and Amoxicillin    Social History:      The patient currently lives     TOBACCO:   reports that she has never smoked. She has never used smokeless tobacco.  ETOH:   reports current alcohol use. Family History:      Reviewed in detail and negative for DM, CAD, Cancer, CVA. Positive as follows: Adopted: Yes   Family history unknown: Yes       REVIEW OF SYSTEMS:   Pertinent positives as noted in the HPI. All other systems reviewed and negative. PHYSICAL EXAM:    BP (!) 141/97   Pulse (!) 120   Temp 97.5 °F (36.4 °C) (Oral)   Resp 18   Ht 5' 3\" (1.6 m)   Wt 195 lb (88.5 kg)   SpO2 91%   BMI 34.54 kg/m²     General appearance:  No apparent distress, appears stated age and cooperative. HEENT:  Normal cephalic, atraumatic without obvious deformity. Pupils equal, round, and reactive to light. Extra ocular muscles intact. Conjunctivae/corneas clear. Neck: Supple, with full range of motion. No jugular venous distention. Trachea midline. Respiratory:  Normal respiratory effort. Clear to auscultation, bilaterally without Rales/Wheezes/Rhonchi. Cardiovascular:  Regular rate and rhythm with normal S1/S2 without murmurs, rubs or gallops. Abdomen: Soft, non-tender, non-distended with normal bowel sounds. Musculoskeletal:  No clubbing, cyanosis or edema bilaterally. Full range of motion without deformity. Skin: Skin color, texture, turgor normal.  No rashes or lesions. Neurologic:  Neurovascularly intact without any focal sensory/motor deficits. Cranial nerves: II-XII intact, grossly non-focal.  Psychiatric:  Alert and oriented, thought content appropriate, normal insight  Capillary Refill: Brisk,< 3 seconds   Peripheral Pulses: +2 palpable, equal bilaterally       CXR:  I have reviewed the CXR with the following interpretation:   EKG:  I have reviewed the EKG with the following interpretation:     Labs:     Recent Labs     01/12/23  1056 01/14/23  1142   WBC 23.7* 17.7*   HGB 11.1* 12.0   HCT 38.3 40.8   * 919*     Recent Labs     01/14/23  1142   *   K 4.8   CL 91*   CO2 29   BUN 61*   CREATININE 1.4*   CALCIUM 9.6     No results for input(s): AST, ALT, BILIDIR, BILITOT, ALKPHOS in the last 72 hours. Recent Labs     01/12/23  0945   INR 1.60*     No results for input(s): Antione Oiler in the last 72 hours. Urinalysis:      Lab Results   Component Value Date/Time    NITRU Negative 01/12/2023 03:28 PM    WBCUA 21-50 01/12/2023 03:28 PM    BACTERIA 4+ 01/12/2023 03:28 PM    RBCUA 5-10 01/12/2023 03:28 PM    BLOODU LARGE 01/12/2023 03:28 PM    SPECGRAV >=1.030 01/12/2023 03:28 PM    GLUCOSEU Negative 01/12/2023 03:28 PM         ASSESSMENT:      Intractable nausea and vomiting, due to peritoneal carcinomatosis, mild ileus on KUB  IV fluids, antiemetics, clear liquid diet  Consult oncology    -large ovarian mass, Peritoneal carcinomatosis with ascites, small pleural effusions likely metastatic ovarian cancer. .    S/p CT-guided biopsy of ovarian mass 1/12--pathology pending  Status post recent outpatient PET/CT--no records available  . Evangelina Lundberg   Consult oncology    -Acute kidney injury, prerenal--creatinine elevated to 1.4, baseline creatinine 0.6-0.8--continue IV fluids monitor creatinine     -Acute on chronic abdominal pain/discomfort related to peritoneal carcinomatosis -currently improved to baseline--continue pain regimen     -Reactive leukocytosis and thrombocytosis/microcytic anemia related to malignancy--chronic without evidence of infection-continue to monitor     -Recent UTI/bacteriuria-culture positive for Klebsiella 25 K CFUS--treated with levofloxacin. [facility] Billing UA pending        -Obesity with BMI 34            DVT Prophylaxis: Lovenox  Diet: Clear liquid diet  Code Status: Full code         Amira Meza MD    Thank you Pcp No for the opportunity to be involved in this patient's care. If you have any questions or concerns please feel free to contact me at [phone].

## 2023-01-15 NOTE — PROGRESS NOTES
Patient pale looking, sats were 83-85% on room air. Nasal Canula placed and patient on 2 L O2 satting at 93-94%. Patient stated she doesn't want to take deep breaths bc it brings on her hiccups and nausea. MD notified. Chest xray ordered. Patient informed.

## 2023-01-15 NOTE — PROGRESS NOTES
Vitals:    01/15/23 0830   BP: (!) 148/100   Pulse: (!) 101   Resp: 18   Temp: 98.4 °F (36.9 °C)   SpO2: 91%     Patient in bed and complains of ab pain 6/10-given morphine, and mild nausea- given zofran. Patient able to take a couple bites of lemon ice. Bowels hypoactive, belly soft. Patient resting and has no needs at this time. Will continue to monitor.

## 2023-01-15 NOTE — PROGRESS NOTES
Hospitalist Progress Note      PCP: Pcp No    Date of Admission: 1/14/2023    Chief Complaint:     Hospital Course:     Subjective:     Continues to have nausea, vomiting and abdominal pain. .. No BMs in 2 days      Medications:  Reviewed    Infusion Medications    sodium chloride 125 mL/hr at 01/15/23 1014    sodium chloride 5 mL/hr at 01/14/23 1759     Scheduled Medications    sodium chloride flush  5-40 mL IntraVENous 2 times per day    enoxaparin  40 mg SubCUTAneous Daily    levofloxacin  500 mg IntraVENous Q48H     PRN Meds: sodium chloride flush, sodium chloride, ondansetron **OR** ondansetron, polyethylene glycol, acetaminophen **OR** acetaminophen, promethazine, morphine      Intake/Output Summary (Last 24 hours) at 1/15/2023 1350  Last data filed at 1/14/2023 1740  Gross per 24 hour   Intake --   Output 30 ml   Net -30 ml       Physical Exam Performed:    BP (!) 135/100   Pulse (!) 101   Temp 98.4 °F (36.9 °C) (Axillary)   Resp 18   Ht 5' 3\" (1.6 m)   Wt 199 lb 1.2 oz (90.3 kg)   SpO2 91%   BMI 35.26 kg/m²     General appearance: No apparent distress, appears stated age and cooperative. HEENT: Pupils equal, round, and reactive to light. Conjunctivae/corneas clear. Neck: Supple, with full range of motion. No jugular venous distention. Trachea midline. Respiratory:  Normal respiratory effort. Clear to auscultation, bilaterally without Rales/Wheezes/Rhonchi. Cardiovascular: Regular rate and rhythm with normal S1/S2 without murmurs, rubs or gallops. Abdomen: Soft, non-tender, non-distended with normal bowel sounds. Musculoskeletal: No clubbing, cyanosis or edema bilaterally. Full range of motion without deformity. Skin: Skin color, texture, turgor normal.  No rashes or lesions. Neurologic:  Neurovascularly intact without any focal sensory/motor deficits.  Cranial nerves: II-XII intact, grossly non-focal.  Psychiatric: Alert and oriented, thought content appropriate, normal insight  Capillary Refill: Brisk, 3 seconds, normal   Peripheral Pulses: +2 palpable, equal bilaterally       Labs:   Recent Labs     01/14/23  1142 01/15/23  0445   WBC 17.7* 13.6*   HGB 12.0 11.0*   HCT 40.8 38.0   * 739*     Recent Labs     01/14/23  1142 01/15/23  0445   * 138   K 4.8 4.7   CL 91* 93*   CO2 29 30   BUN 61* 65*   CREATININE 1.4* 1.6*   CALCIUM 9.6 9.2     No results for input(s): AST, ALT, BILIDIR, BILITOT, ALKPHOS in the last 72 hours. No results for input(s): INR in the last 72 hours. No results for input(s): Evonne Belts in the last 72 hours. Urinalysis:      Lab Results   Component Value Date/Time    NITRU Negative 01/14/2023 01:55 PM    WBCUA 0-2 01/14/2023 01:55 PM    BACTERIA 1+ 01/14/2023 01:55 PM    RBCUA 11-20 01/14/2023 01:55 PM    BLOODU LARGE 01/14/2023 01:55 PM    SPECGRAV >=1.030 01/14/2023 01:55 PM    GLUCOSEU Negative 01/14/2023 01:55 PM       Radiology:  XR ABDOMEN (KUB) (SINGLE AP VIEW)   Final Result   1. Nonspecific bowel gas pattern suggestive for mild degree of ileus   2. At least moderate ostial arthritis right hip      US RENAL COMPLETE    (Results Pending)       IP CONSULT TO HOSPITALIST  IP CONSULT TO ONCOLOGY    Assessment/Plan:    Intractable nausea and vomiting, due to peritoneal carcinomatosis, mild ileus on KUB  Continue IV fluids, antiemetics, clear liquid diet. .  Repeat KUB in 24 hours       -large ovarian mass, Peritoneal carcinomatosis with ascites, small pleural effusions likely metastatic ovarian cancer. .    S/p CT-guided biopsy of ovarian mass 1/12--pathology pending  Status post recent outpatient PET/CT--no records available  . Codie Blair Follow-up with oncology     -Acute kidney injury, prerenal--creatinine elevated to 1.4-1.6, baseline creatinine 0.6-0.8--continue IV fluids monitor creatinine. .  Obtain renal ultrasound to rule out     -chronic abdominal pain/discomfort related to peritoneal carcinomatosis ---continue pain regimen     -Reactive leukocytosis and thrombocytosis/microcytic anemia related to malignancy--chronic without evidence of infection-continue to monitor     -Recent UTI/bacteriuria-culture positive for Klebsiella 25 K CFUS--complete course of  levofloxacin. .          -Obesity with BMI 34         DVT Prophylaxis: lovenox  Diet: ADULT DIET;  Clear Liquid  Code Status: Full Code  PT/OT Eval Status:     Dispo -           Hans Robles MD

## 2023-01-15 NOTE — PLAN OF CARE
Problem: Gastrointestinal - Adult  Goal: Minimal or absence of nausea and vomiting  Outcome: Progressing       Problem: Safety - Adult  Goal: Free from fall injury  Outcome: Progressing     Problem: Discharge Planning  Goal: Discharge to home or other facility with appropriate resources  Outcome: Progressing     Problem: Pain  Goal: Verbalizes/displays adequate comfort level or baseline comfort level  Outcome: Progressing

## 2023-01-15 NOTE — PROGRESS NOTES
Patient alert and oriented x4. Oriented to room. Call light within reach. Denies pain at this time but reports nausea at this time. Port is accessed, cap applied.

## 2023-01-16 NOTE — PROGRESS NOTES
ONCOLOGY HEMATOLOGY CARE PROGRESS NOTE      SUBJECTIVE:  Complains of abdominal distention, pain, nausea. Patient is currently preparing to undergo CT scan of the abdomen and is able to retain the order contrast.  Passing flatus. ROS:     12 point system reviewed. Negative except for what is stated in the HPI. OBJECTIVE        Physical    VITALS:  Patient Vitals for the past 24 hrs:   BP Temp Temp src Pulse Resp SpO2 Weight   01/16/23 1308 -- -- -- -- 18 -- --   01/16/23 1238 -- -- -- -- 18 -- --   01/16/23 1157 134/76 98.1 °F (36.7 °C) Axillary (!) 103 14 93 % --   01/16/23 0909 -- -- -- -- 16 -- --   01/16/23 0830 (!) 154/94 98.2 °F (36.8 °C) Axillary 98 18 92 % --   01/16/23 0718 -- -- -- -- -- -- 204 lb 2.3 oz (92.6 kg)   01/16/23 0415 -- -- -- -- 20 -- --   01/16/23 0334 (!) 146/96 97.9 °F (36.6 °C) Axillary (!) 101 20 94 % --   01/16/23 0017 (!) 149/94 97.2 °F (36.2 °C) Temporal 100 18 94 % --   01/15/23 2134 -- -- -- -- 18 -- --   01/15/23 2104 -- -- -- -- -- 94 % --   01/15/23 2058 (!) 162/115 97.5 °F (36.4 °C) Oral (!) 106 20 93 % --   01/15/23 1630 123/75 97.6 °F (36.4 °C) Oral (!) 104 15 93 % --   01/15/23 1625 -- -- -- -- -- (!) 85 % --       24HR INTAKE/OUTPUT:    Intake/Output Summary (Last 24 hours) at 1/16/2023 1402  Last data filed at 1/16/2023 1203  Gross per 24 hour   Intake 2707.63 ml   Output 700 ml   Net 2007.63 ml       CONSTITUTIONAL: awake, alert, cooperative, in mild distress. EYES: pupils equal, round and reactive to light, sclera clear and conjunctiva normal  ENT: Normocephalic, without obvious abnormality, atraumatic  NECK: supple, symmetrical, no jugular venous distension and no carotid bruits   HEMATOLOGIC/LYMPHATIC: no cervical, supraclavicular or axillary lymphadenopathy   LUNGS: no increased work of breathing and clear to auscultation   CARDIOVASCULAR: regular rate and rhythm, normal S1 and S2, no murmur noted.     ABDOMEN: Distended, feeble bowel sounds. Ascites versus paralytic ileus. MUSCULOSKELETAL: full range of motion noted, tone is normal  NEUROLOGIC: awake, alert, oriented to name, place and time. Motor skills grossly intact. SKIN: Normal skin color, texture, turgor and no jaundice. appears intact   EXTREMITIES: no LE edema     DATA:  CBC:    Recent Labs     01/16/23  0355 01/15/23  0445 01/14/23  1142 01/12/23  1056   WBC 16.2* 13.6* 17.7* 23.7*   NEUTROABS 13.8* 11.1* 14.9* 20.9*   LYMPHOPCT 7.3 9.1 12.0 5.9   RBC 5.14 5.27* 5.68* 5.34*   HGB 10.9* 11.0* 12.0 11.1*   HCT 36.6 38.0 40.8 38.3   MCV 71.2* 72.2* 71.8* 71.7*   MCH 21.2* 20.8* 21.2* 20.8*   MCHC 29.8* 28.9* 29.5* 28.9*   RDW 30.6* 30.3* 31.3* 31.0*   * 739* 919* 787*       PT/INR:    Recent Labs     01/12/23  0945 01/10/23  2337   PROT  --  6.6   INR 1.60*  --      PTT:  No results for input(s): APTT in the last 720 hours. CMP:    Recent Labs     01/16/23  0355 01/15/23  0445 01/14/23  1142 01/10/23  2337    138 135* 140   K 4.1 4.7 4.8 4.7   CL 99 93* 91* 100   CO2 28 30 29 27   GLUCOSE 104* 100* 129* 130*   BUN 55* 65* 61* 31*   CREATININE 1.3* 1.6* 1.4* 1.2   LABGLOM 46* 36* 42* 50*   CALCIUM 8.5 9.2 9.6 9.2   PROT  --   --   --  6.6   LABALBU  --   --   --  3.0*   AGRATIO  --   --   --  0.8*   BILITOT  --   --   --  <0.2   ALKPHOS  --   --   --  153*   ALT  --   --   --  6*   AST  --   --   --  12*       Lab Results   Component Value Date    CALCIUM 8.5 01/16/2023       LDH:No results for input(s): LDH in the last 720 hours. Radiology Review:  XR CHEST PORTABLE  Narrative: History: Difficulty breathing. AP chest.    FINDINGS: Blunting of the costophrenic angles suggesting small effusions. Right upper chest power injectable port with tip in the midsuperior vena cava. Bilateral lower lung airspace disease suggesting pulmonary edema and atelectasis. Superimposed   pneumonia be difficult to exclude. Impression: 1.  Cardiomegaly with probable effusions and basilar airspace disease suggesting pulmonary edema and atelectasis. Superimposed pneumonia is not excluded. Problem List  Patient Active Problem List   Diagnosis    Failure to thrive in adult    Abdominal pain    Ascites    Microcytic anemia    Nausea and vomiting       ASSESSMENT AND PLAN:    1. Peritoneal carcinomatosis, ovarian malignancy.    -Biopsy of the right iliac lymph node returned positive for poorly differentiated adenocarcinoma. IHC studies suggestive of GYN origin, GATA3 focally positive.  -imaging studies  revealed multiple omental lesions, right pelvic lymphadenopathy. The largest iliac lymph node noted was approximately 3 x 1.8 cm. CT scan of the chest was positive for moderate left-sided pleural effusion and small right-sided pleural effusion with no evidence of intrapulmonary or hilar/mediastinal lymphadenopathy.   -Serum  on 1/6/2023 was elevated to  -1141.2, serum ferritin of 171.5.  -Patient is planned for CT scan of the abdomen today.  -Patient will need neoadjuvant chemotherapy.  -We will try to arrange as an inpatient if possible. 2.  Nausea/vomiting.  -Symptomatic management for now. 3.  Abdominal distention.  -If the imaging studies revealed recurrent ascites, may need paracentesis for symptom relief. 4.  Symptomatic management as per the primary team.    Discussed with the primary team-appreciate input.           ONCOLOGIC DISPOSITION:      Mattie Wilder MD  Please contact through [employer]

## 2023-01-16 NOTE — PLAN OF CARE
Problem: Safety - Adult  Goal: Free from fall injury  1/16/2023 0941 by Cayden Hill RN  Note: Pt is a high fall risk (see Damion Bernal Fall Risk assessment). Oriented pt to room and call light. Instructed pt to call for help when needed. Call light and personal items within reach. Bed in lowest position, brakes on, and 2/4 side rails up. Non-skid footwear on. Falls risk stop sign on door; hourly visual checks implemented. Falls risk arm band on pt. Bed alarm is on. Pt using call light appropriately. Will continue to monitor. Problem: Pain  Goal: Verbalizes/displays adequate comfort level or baseline comfort level  Note: Pt with complaints of 7/10 ABD pain during shift. Medicated with  morphine PRN  per MAR with good response per pt. Pt denies further needs at this time. Will continue to monitor and implement plan of care.

## 2023-01-16 NOTE — CARE COORDINATION
Case Management Assessment           Initial Evaluation                Date / Time of Evaluation: 1/16/2023 11:25 AM                 Assessment Completed by: SRINIVASA Hollingsworth    Patient Name: Mickie Parker     YOB: 1960  Diagnosis: Nausea and vomiting [R11.2]  Nausea and vomiting, unspecified vomiting type [R11.2]     Date / Time: 1/14/2023 11:12 AM    Patient Admission Status: Inpatient    If patient is discharged prior to next notation, then this note serves as note for discharge by case management. Current PCP: Pcp No  Clinic Patient: No    Chart Reviewed: Yes  Patient/ Family Interviewed: Yes    Initial assessment completed at bedside with: patient    Hospitalization in the last 30 days: Yes    Emergency Contacts:  Extended Emergency Contact Information  Primary Emergency Contact: paul martines  Home Phone: [phone]  Relation: Girlfriend    Advance Directives:   Code Status: Full [address]: No      Financial  Payor: [employer] / Plan: [address] [place] [address] / Product Type: *No Product type* /     Pre-cert required for SNF: Yes    Pharmacy    [id number] [address] [phone] - F [phone]  [address] [place] [address]  Phone: [phone] Fax: [phone]      Potential assistance Purchasing Medications: Potential Assistance Purchasing Medications: No  Does Patient want to participate in local refill/ meds to beds program?:      Meds To Beds General Rules:  1. Can ONLY be done Monday- Friday between 8:30am-5pm  2. Prescription(s) must be in pharmacy by 3pm to be filled same day  3. Copy of patient's insurance/ prescription drug card and patient face sheet must be sent along with the prescription(s)  4. Cost of Rx cannot be added to hospital bill. If financial assistance is needed, please contact unit  or ;   or  CANNOT provide pharmacy voucher for patients co-pays  5. Patients can then  the prescription on their way out of the hospital at discharge, or pharmacy can deliver to the bedside if staff is available. (payment due at time of pick-up or delivery - cash, check, or card accepted)     Able to afford home medications/ co-pay costs: Yes    ADLS  Support Systems: Friends/Neighbors    PT AM-PAC:   /24  OT AM-PAC:   /24    [place]: home alone  Steps: yes    Plans to RETURN to current housing: Yes  Barriers to RETURNING to current housing: medical clearance    Home Care Information  Currently ACTIVE with [address]: No  Home Care Agency: Not Applicable    Currently ACTIVE with [place] [facility] Aging: No  Passport/ Waiver: No  Passport/ Waiver Services: Not Applicable      Durable Medical Equipment  DME Provider: none  Equipment: none    Home Oxygen and [address] [place] prior to admission: No  [address]: Not Applicable  Other Respiratory Equipment: none    Informed of need to bring portable home O2 tank on day of DISCHARGE for nursing to connect prior to leaving: No  Verbalized agreement/Understanding: No  Person to bring portable tank at discharge: none    Dialysis  Active with HD/PD prior to admission: No  Nephrologist: none    HD Center:  Not Applicable    DISCHARGE PLAN:  Disposition: Home- No Services Needed    Transportation PLAN for discharge: family     Factors facilitating achievement of predicted outcomes: Family support, Motivated, and Cooperative    Barriers to discharge: medical clearance    Additional Case Management Notes: Patient is from home alone. She reports that she is independent at baseline. Following for possible discharge needs.     The Plan for Transition of Care is related to the following treatment goals of Nausea and vomiting [R11.2]  Nausea and vomiting, unspecified vomiting type [R11.2]    The Patient and/or patient representative Judith and her family were provided with a choice of provider and agrees with the discharge plan Yes    Freedom of choice list was provided with basic dialogue that supports the patient's individualized plan of care/goals and shares the quality data associated with the providers.  Yes    Care Transition patient: No    Sushma Chavez  Case Management Department  Ph: [phone]   Fax: [phone]

## 2023-01-16 NOTE — PROGRESS NOTES
Hospitalist Progress Note      PCP: Pcp No    Date of Admission: 1/14/2023    Chief Complaint:     Hospital Course:     Subjective:     Patient seen and examined this morning. .  Reports nausea and vomiting are better overall although had 1 episode this morning no vomiting      Medications:  Reviewed    Infusion Medications    sodium chloride 125 mL/hr at 01/16/23 0843    sodium chloride 5 mL/hr at 01/14/23 2019     Scheduled Medications    sodium chloride flush  5-40 mL IntraVENous 2 times per day    enoxaparin  40 mg SubCUTAneous Daily    levofloxacin  500 mg IntraVENous Q48H     PRN Meds: sodium chloride flush, sodium chloride, ondansetron **OR** ondansetron, polyethylene glycol, acetaminophen **OR** acetaminophen, promethazine, morphine      Intake/Output Summary (Last 24 hours) at 1/16/2023 0917  Last data filed at 1/16/2023 0854  Gross per 24 hour   Intake 2707.63 ml   Output 700 ml   Net 2007.63 ml         Physical Exam Performed:    BP (!) 154/94   Pulse 98   Temp 98.2 °F (36.8 °C) (Axillary)   Resp 18   Ht 5' 3\" (1.6 m)   Wt 204 lb 2.3 oz (92.6 kg)   SpO2 92%   BMI 36.16 kg/m²     General appearance: No apparent distress, appears stated age and cooperative. HEENT: Pupils equal, round, and reactive to light. Conjunctivae/corneas clear. Neck: Supple, with full range of motion. No jugular venous distention. Trachea midline. Respiratory:  Normal respiratory effort. Clear to auscultation, bilaterally without Rales/Wheezes/Rhonchi. Cardiovascular: Regular rate and rhythm with normal S1/S2 without murmurs, rubs or gallops. Abdomen: Soft, non-tender, non-distended with normal bowel sounds. Musculoskeletal: No clubbing, cyanosis or edema bilaterally. Full range of motion without deformity. Skin: Skin color, texture, turgor normal.  No rashes or lesions. Neurologic:  Neurovascularly intact without any focal sensory/motor deficits.  Cranial nerves: II-XII intact, grossly non-focal.  Psychiatric: Alert and oriented, thought content appropriate, normal insight  Capillary Refill: Brisk, 3 seconds, normal   Peripheral Pulses: +2 palpable, equal bilaterally       Labs:   Recent Labs     01/14/23  1142 01/15/23  0445 01/16/23  0355   WBC 17.7* 13.6* 16.2*   HGB 12.0 11.0* 10.9*   HCT 40.8 38.0 36.6   * 739* 638*       Recent Labs     01/14/23  1142 01/15/23  0445 01/16/23  0355   * 138 140   K 4.8 4.7 4.1   CL 91* 93* 99   CO2 29 30 28   BUN 61* 65* 55*   CREATININE 1.4* 1.6* 1.3*   CALCIUM 9.6 9.2 8.5       No results for input(s): AST, ALT, BILIDIR, BILITOT, ALKPHOS in the last 72 hours. No results for input(s): INR in the last 72 hours. No results for input(s): Shellia Bugler in the last 72 hours. Urinalysis:      Lab Results   Component Value Date/Time    NITRU Negative 01/14/2023 01:55 PM    WBCUA 0-2 01/14/2023 01:55 PM    BACTERIA 1+ 01/14/2023 01:55 PM    RBCUA 11-20 01/14/2023 01:55 PM    BLOODU LARGE 01/14/2023 01:55 PM    SPECGRAV >=1.030 01/14/2023 01:55 PM    GLUCOSEU Negative 01/14/2023 01:55 PM       Radiology:  XR CHEST PORTABLE   Final Result   1. Cardiomegaly with probable effusions and basilar airspace disease suggesting pulmonary edema and atelectasis. Superimposed pneumonia is not excluded. XR ABDOMEN (KUB) (SINGLE AP VIEW)   Final Result   1. Nonspecific bowel gas pattern suggestive for mild degree of ileus   2. At least moderate ostial arthritis right hip          IP CONSULT TO HOSPITALIST  IP CONSULT TO ONCOLOGY    Assessment/Plan:    Intractable nausea and vomiting, due to ovarian malignancy with peritoneal carcinomatosis, mild ileus on KUB  Continue IV fluids, antiemetics, clear liquid diet. .    Ordered CT abdomen 1/16--patient reports increased abdominal tightness       -large ovarian mass, Peritoneal carcinomatosis with ascites, small pleural effusions likely metastatic ovarian cancer. .    S/p CT-guided biopsy of ovarian mass 1/12--pathology pending  Status post recent outpatient PET/CT--no records available  . [place]  Follow-up with oncology, plan to initiate chemo outpatient     -Acute kidney injury, prerenal--creatinine elevated to 1.4-1.6, baseline creatinine 0.6-0.8-improving, down to 1.3-continue IV fluids monitor creatinine. .     -Cardiomegaly on chest x-ray--x-ray findings show small pleural effusions and atelectasis noted on prior imaging at [facility] likely due to malignancy. .. Ordered BNP and echo     -chronic abdominal pain/discomfort related to peritoneal carcinomatosis ---continue pain regimen     -Reactive leukocytosis and thrombocytosis/microcytic anemia related to malignancy--chronic without evidence of infection-continue to monitor     -Recent UTI/bacteriuria-culture positive for Klebsiella 25 K CFUS recent admission-procalcitonin is elevated-complete course of  levofloxacin. .          -Obesity with BMI 34         DVT Prophylaxis: lovenox  Diet: ADULT DIET;  Clear Liquid  Code Status: Full Code  PT/OT Eval Status:     Dispo -           Jeff Lund MD

## 2023-01-17 NOTE — PLAN OF CARE
Problem: Safety - Adult  Goal: Free from fall injury  Outcome: Progressing  Flowsheets (Taken 1/17/2023 0256)  Free From Fall Injury: Instruct family/caregiver on patient safety   Problem: Gastrointestinal - Adult  Goal: Minimal or absence of nausea and vomiting  Outcome: Not Progressing  Flowsheets (Taken 1/17/2023 0256)  Minimal or absence of nausea and vomiting:   Administer IV fluids as ordered to ensure adequate hydration   Administer ordered antiemetic medications as needed  Goal: Maintains or returns to baseline bowel function  Outcome: Not Progressing  Flowsheets (Taken 1/17/2023 0256)  Maintains or returns to baseline bowel function:   Administer IV fluids as ordered to ensure adequate hydration   Administer ordered medications as needed   Encourage mobilization and activity  Goal: Maintains adequate nutritional intake  Outcome: Not Progressing  Flowsheets (Taken 1/17/2023 0256)  Maintains adequate nutritional intake: Monitor intake and output, weight and lab values  Note: Pt states she has not been able to eat for 2 weeks     Problem: Pain  Goal: Verbalizes/displays adequate comfort level or baseline comfort level  Outcome: Not Progressing  Flowsheets (Taken 1/17/2023 0256)  Verbalizes/displays adequate comfort level or baseline comfort level:   Encourage patient to monitor pain and request assistance   Assess pain using appropriate pain scale   Administer analgesics based on type and severity of pain and evaluate response   Implement non-pharmacological measures as appropriate and evaluate response

## 2023-01-17 NOTE — PLAN OF CARE
Problem: Safety - Adult  Goal: Free from fall injury  1/17/2023 1409 by Felipa Felix RN  Note: Pt is a high fall risk (see Jorge Green Fall Risk assessment). Oriented pt to room and call light. Instructed pt to call for help when needed. Call light and personal items within reach. Bed in lowest position, brakes on, and 2/4 side rails up. Non-skid footwear on. Falls risk stop sign on door; hourly visual checks implemented. Falls risk arm band on pt. Bed alarm is on. Pt using call light appropriately. Will continue to monitor. Problem: Pain  Goal: Verbalizes/displays adequate comfort level or baseline comfort level  1/17/2023 1409 by Felipa Felix RN  Note: Pt with complaints of 8/10 ABD pain during shift. Medicated with  morphine PRN  per [facility] with fair response per pt. Pt denies further needs at this time. Will continue to monitor and implement plan of care.        Problem: Gastrointestinal - Adult  Goal: Minimal or absence of nausea and vomiting  1/17/2023 0256 by Edwin Borjas RN  Outcome: Not Progressing  Flowsheets (Taken 1/17/2023 0256)  Minimal or absence of nausea and vomiting:   Administer IV fluids as ordered to ensure adequate hydration   Administer ordered antiemetic medications as needed  Goal: Maintains or returns to baseline bowel function  1/17/2023 0256 by Edwin Borjas RN  Outcome: Not Progressing  Flowsheets (Taken 1/17/2023 0256)  Maintains or returns to baseline bowel function:   Administer IV fluids as ordered to ensure adequate hydration   Administer ordered medications as needed   Encourage mobilization and activity  Goal: Maintains adequate nutritional intake  1/17/2023 0256 by Edwin Borjas RN  Outcome: Not Progressing  Flowsheets (Taken 1/17/2023 0256)  Maintains adequate nutritional intake: Monitor intake and output, weight and lab values  Note: Pt states she has not been able to eat for 2 weeks     Problem: Pain  Goal: Verbalizes/displays adequate comfort level or baseline comfort level  1/17/2023 1409 by Nadir Cotter, RN  Note: Pt with complaints of 8/10 ABD pain during shift. Medicated with  morphine PRN  per [facility] with fair response per pt. Pt denies further needs at this time. Will continue to monitor and implement plan of care.     1/17/2023 0256 by Braydon Ramey RN  Outcome: Not Progressing  Flowsheets (Taken 1/17/2023 0256)  Verbalizes/displays adequate comfort level or baseline comfort level:   Encourage patient to monitor pain and request assistance   Assess pain using appropriate pain scale   Administer analgesics based on type and severity of pain and evaluate response   Implement non-pharmacological measures as appropriate and evaluate response

## 2023-01-17 NOTE — PROGRESS NOTES
ONCOLOGY HEMATOLOGY CARE PROGRESS NOTE      SUBJECTIVE:    Judith is seen today following completion of ECHO. She reports a dry mouth. States she is unable to eat or drink much due to early satiety. Has not had a BM. She reports acid reflux today. She states that she cannot go on much longer feeling the way she is feeling. Requesting alternative nutrition. ABD is tight. ROS:     Constitutional:   No fever, No chills, No night sweats.     Eyes:  No impairment or change in vision  ENT / Mouth:  No pain, abnormal ulceration, bleeding, nasal drip or change in voice or hearing  Cardiovascular:  No chest pain, palpitations, new edema, or calf discomfort  Respiratory:  No pain, hemoptysis, change to breathing  Breast:  No pain, discharge, change in appearance or texture  Gastrointestinal:  + pain, cramping, jaundice, +change to eating and bowel habits  Urinary:  No pain, bleeding or change in continence  Genitalia: No pain, bleeding or discharge  Musculoskeletal:  No redness, pain, edema or weakness  Skin:  No pruritus, rash, change to nodules or lesions  Neurologic:  No discomfort, change in mental status, speech, sensory or motor activity  Psychiatric:  No change in concentration or change to affect or mood  Endocrine:  No hot flashes, increased thirst, or change to urine production  Hematologic: No petechiae, ecchymosis or bleeding  Lymphatic:  No lymphadenopathy or lymphedema  Allergy / Immunologic:  No eczema, hives, frequent or recurrent infections    OBJECTIVE        Physical    VITALS:  Patient Vitals for the past 24 hrs:   BP Temp Temp src Pulse Resp SpO2 Weight   01/17/23 0752 (!) 143/102 97.7 °F (36.5 °C) Oral (!) 109 20 95 % 209 lb 7 oz (95 kg)   01/17/23 0354 (!) 139/94 98.2 °F (36.8 °C) Oral 100 18 93 % --   01/17/23 0001 (!) 143/90 98.2 °F (36.8 °C) Oral (!) 105 16 92 % --   01/16/23 2002 -- -- -- -- -- 92 % --   01/16/23 2000 (!) 145/98 98.3 °F (36.8 °C) Oral (!) 105 20 (!) 83 % --   01/16/23 1734 -- -- -- -- 14 -- --   01/16/23 1530 (!) 160/101 98.6 °F (37 °C) Axillary (!) 108 16 92 % --   01/16/23 1308 -- -- -- -- 18 -- --   01/16/23 1238 -- -- -- -- 18 -- --   01/16/23 1157 134/76 98.1 °F (36.7 °C) Axillary (!) 103 14 93 % --   01/16/23 0909 -- -- -- -- 16 -- --   01/16/23 0830 (!) 154/94 98.2 °F (36.8 °C) Axillary 98 18 92 % --       24HR INTAKE/OUTPUT:    Intake/Output Summary (Last 24 hours) at 1/17/2023 5934  Last data filed at 1/17/2023 8886  Gross per 24 hour   Intake 4717.25 ml   Output 1500 ml   Net 3217.25 ml       CONSTITUTIONAL: awake, alert, cooperative, ill appearing   EYES: pupils equal, round and reactive to light, sclera clear and conjunctiva normal  ENT: Normocephalic, without obvious abnormality, atraumatic  NECK: supple, symmetrical, no jugular venous distension and no carotid bruits   HEMATOLOGIC/LYMPHATIC: no cervical, supraclavicular or axillary lymphadenopathy   LUNGS: no increased work of breathing and clear to auscultation   CARDIOVASCULAR: regular rate and rhythm, normal S1 and S2, no murmur noted  ABDOMEN: distended and firm. Tender to palpation  MUSCULOSKELETAL: full range of motion noted, tone is normal  NEUROLOGIC: awake, alert, oriented to name, place and time. Motor skills grossly intact. SKIN: Pale skin color, texture, turgor and no jaundice. appears intact   EXTREMITIES: no LE edema     DATA:  CBC:    Recent Labs     01/17/23  0401 01/16/23  0355 01/15/23  0445 01/14/23  1142   WBC 19.0* 16.2* 13.6* 17.7*   NEUTROABS 15.0* 13.8* 11.1* 14.9*   LYMPHOPCT 5.0 7.3 9.1 12.0   RBC 4.96 5.14 5.27* 5.68*   HGB 10.7* 10.9* 11.0* 12.0   HCT 36.1 36.6 38.0 40.8   MCV 72.8* 71.2* 72.2* 71.8*   MCH 21.7* 21.2* 20.8* 21.2*   MCHC 29.8* 29.8* 28.9* 29.5*   RDW 30.3* 30.6* 30.3* 31.3*   * 638* 739* 919*       PT/INR:    Recent Labs     01/12/23  0945 01/10/23  0387   PROT  --  6.6   INR 1.60*  --      PTT:  No results for input(s):  APTT in the last 720 hours. CMP:    Recent Labs     01/17/23  0401 01/16/23  0355 01/15/23  0445 01/14/23  1142 01/10/23  2337    140 138 135* 140   K 4.3 4.1 4.7 4.8 4.7   CL 99 99 93* 91* 100   CO2 27 28 30 29 27   GLUCOSE 104* 104* 100* 129* 130*   BUN 44* 55* 65* 61* 31*   CREATININE 1.4* 1.3* 1.6* 1.4* 1.2   LABGLOM 42* 46* 36* 42* 50*   CALCIUM 8.7 8.5 9.2 9.6 9.2   PROT  --   --   --   --  6.6   LABALBU  --   --   --   --  3.0*   AGRATIO  --   --   --   --  0.8*   BILITOT  --   --   --   --  <0.2   ALKPHOS  --   --   --   --  153*   ALT  --   --   --   --  6*   AST  --   --   --   --  12*       Lab Results   Component Value Date    CALCIUM 8.7 01/17/2023       LDH:No results for input(s): LDH in the last 720 hours. Radiology Review:  CT ABDOMEN PELVIS WO CONTRAST Additional Contrast? Oral  Narrative: EXAM: CT Abdomen and Pelvis without Contrast    INDICATION: Ovarian mass with peritoneal carcinomatosis--has increased abdominal distention concerning for ileus    COMPARISON: 1/13/2023 and 12/29/2022    TECHNIQUE: Multiplanar reformatted images of the abdomen and pelvis are provided for review. Up-to-date CT equipment and radiation dose reduction techniques were employed. IV Contrast: None. Oral Contrast: No.    FINDINGS:    Lung Bases: Moderate bilateral pleural effusions with associated bibasilar atelectasis. Liver: Normal.    Gallbladder and Biliary Tree: No calcified gallstones. Normal wall thickness. No intra- or extrahepatic biliary dilatation. Pancreas: Normal.    Spleen: Normal.    Adrenal Glands: Normal.    Kidneys and Ureters: Normal.    Urinary Bladder: Normal.    Bowel: There is diffuse distention, fluid and gas, of small bowel loops, throughout the abdomen and pelvis. Small bowel is distended to the level of the distal ileum. The colon is decompressed. Reproductive Organs: The large pelvic mass is again noted.     Lymph Nodes: Right external iliac chain node is not well-visualized with lack of intravenous contrast.    Peritoneum/Retroperitoneum: Small to moderate volume ascites is again noted. Vessels: Aorta and IVC without significant abnormality. Abdominal Wall: Normal.    Bones: There is grade 1-2 anterolisthesis of L4 on L5, which is unchanged from the prior study. No acute osseous abnormality. Other Findings: None. Impression: 1. Diffuse small bowel distention essentially to the ileocecal valve most indicative of ileus. 2. Enlarged pelvic mass again noted. 3. Small to moderate volume ascites. Moderate bilateral pleural effusions. Problem List  Patient Active Problem List   Diagnosis    Failure to thrive in adult    Abdominal pain    Ascites    Microcytic anemia    Nausea and vomiting       ASSESSMENT AND PLAN:    1. Peritoneal carcinomatosis, ovarian malignancy.     -Biopsy of the right iliac lymph node returned positive for poorly differentiated adenocarcinoma. IHC studies suggestive of GYN origin, GATA3 focally positive. - initial imaging studies  revealed multiple omental lesions, right pelvic lymphadenopathy. The largest iliac lymph node noted was approximately 3 x 1.8 cm.  - CT scan of the chest was positive for moderate left-sided pleural effusion and small right-sided pleural effusion with no evidence of intrapulmonary or hilar/mediastinal lymphadenopathy. -CT A/P 1/16/23  with small to moderate ascites, ileus, enlarged pelvic mass   -Serum  on 1/6/2023 was elevated to  -1141.2  -Patient will need neoadjuvant chemotherapy--we discussed Carboplatin/Taxol regimen and plan to initiate treatment while inpatient--she is agreeable   - discussed with Pharmacy   - reviewed basic side effects of treatment but will plan to return tomorrow to discuss further   - plan to start chemotherapy tomorrow      2.   Nausea/vomiting.  -Symptomatic management for now  - added Protonix for GERD   - hospitalist considering TPN      3.  Abdominal distention.  - will order paracentesis     4.  Microcytic anemia  Iron studies mixed   Check soluable transferrin       ONCOLOGIC DISPOSITION:  After chemotherapy and when her symptoms are better controlled     EMILI Johnson - CNP  Please contact through [address]

## 2023-01-17 NOTE — PROGRESS NOTES
Hospitalist Progress Note      PCP: Pcp No    Date of Admission: 1/14/2023    Chief Complaint:     Hospital Course:     Subjective:     Reports persistent abdominal pain and nausea. .  No vomiting today. No bowel movements    Medications:  Reviewed    Infusion Medications    sodium chloride 125 mL/hr at 01/17/23 0808    sodium chloride 5 mL/hr at 01/14/23 2019     Scheduled Medications    sodium chloride flush  5-40 mL IntraVENous 2 times per day    enoxaparin  40 mg SubCUTAneous Daily    levofloxacin  500 mg IntraVENous Q48H     PRN Meds: oxyCODONE, sodium chloride flush, sodium chloride, ondansetron **OR** ondansetron, polyethylene glycol, acetaminophen **OR** acetaminophen, promethazine, morphine      Intake/Output Summary (Last 24 hours) at 1/17/2023 0929  Last data filed at 1/17/2023 1474  Gross per 24 hour   Intake 4577.25 ml   Output 1100 ml   Net 3477.25 ml         Physical Exam Performed:    BP (!) 143/102   Pulse (!) 109   Temp 97.7 °F (36.5 °C) (Oral)   Resp 20   Ht 5' 3\" (1.6 m)   Wt 209 lb 7 oz (95 kg)   SpO2 95%   BMI 37.10 kg/m²     General appearance: No apparent distress, appears stated age and cooperative. HEENT: Pupils equal, round, and reactive to light. Conjunctivae/corneas clear. Neck: Supple, with full range of motion. No jugular venous distention. Trachea midline. Respiratory:  Normal respiratory effort. Clear to auscultation, bilaterally without Rales/Wheezes/Rhonchi. Cardiovascular: Regular rate and rhythm with normal S1/S2 without murmurs, rubs or gallops. Abdomen: Soft, non-tender, firm, distended with decreased bowel sounds. Musculoskeletal: No clubbing, cyanosis or edema bilaterally. Full range of motion without deformity. Skin: Skin color, texture, turgor normal.  No rashes or lesions. Neurologic:  Neurovascularly intact without any focal sensory/motor deficits.  Cranial nerves: II-XII intact, grossly non-focal.  Psychiatric: Alert and oriented, thought content appropriate, normal insight  Capillary Refill: Brisk, 3 seconds, normal   Peripheral Pulses: +2 palpable, equal bilaterally       Labs:   Recent Labs     01/15/23  0445 01/16/23  0355 01/17/23  0401   WBC 13.6* 16.2* 19.0*   HGB 11.0* 10.9* 10.7*   HCT 38.0 36.6 36.1   * 638* 704*       Recent Labs     01/15/23  0445 01/16/23  0355 01/17/23  0401    140 139   K 4.7 4.1 4.3   CL 93* 99 99   CO2 30 28 27   BUN 65* 55* 44*   CREATININE 1.6* 1.3* 1.4*   CALCIUM 9.2 8.5 8.7       No results for input(s): AST, ALT, BILIDIR, BILITOT, ALKPHOS in the last 72 hours. No results for input(s): INR in the last 72 hours. No results for input(s): Brayden Beery in the last 72 hours. Urinalysis:      Lab Results   Component Value Date/Time    NITRU Negative 01/14/2023 01:55 PM    [address] 0-2 01/14/2023 01:55 PM    BACTERIA 1+ 01/14/2023 01:55 PM    RBCUA 11-20 01/14/2023 01:55 PM    BLOODU LARGE 01/14/2023 01:55 PM    SPECGRAV >=1.030 01/14/2023 01:55 PM    GLUCOSEU Negative 01/14/2023 01:55 PM       Radiology:  CT ABDOMEN PELVIS WO CONTRAST Additional Contrast? Oral   Final Result   1. Diffuse small bowel distention essentially to the ileocecal valve most indicative of ileus. 2. Enlarged pelvic mass again noted. 3. Small to moderate volume ascites. Moderate bilateral pleural effusions. XR CHEST PORTABLE   Final Result   1. Cardiomegaly with probable effusions and basilar airspace disease suggesting pulmonary edema and atelectasis. Superimposed pneumonia is not excluded. XR ABDOMEN (KUB) (SINGLE AP VIEW)   Final Result   1. Nonspecific bowel gas pattern suggestive for mild degree of ileus   2.  At least moderate ostial arthritis right hip          IP CONSULT TO HOSPITALIST  IP CONSULT TO ONCOLOGY    Assessment/Plan:    Intractable nausea and vomiting, abdominal pain, due to ovarian malignancy with peritoneal carcinomatosis with ascites, small bowel ileus   Continue IV fluids, antiemetics, clear liquid diet. .    CT abdomen/pelvis 1/17 reviewed--shows small bowel ileus, small to moderate moderate ascites, moderate pleural effusions  Bowel regimen  Ultrasound-guided thoracentesis ordered  Pain control  Palliative care consult     -Metastatic ovarian cancer -large ovarian mass, Peritoneal carcinomatosis with ascites, small pleural effusions likely metastatic ovarian cancer. .    S/p CT-guided biopsy of right iliac lymph node 1/12--  Path positive for poorly differentiated adenocarcinoma. IHC studies suggestive of GYN origin  Discussed with oncology. .  Plan to initiate chemotherapy carboplatin/Taxol tomorrow         -Acute kidney injury, prerenal--creatinine elevated to 1.4-1.6, baseline creatinine 0.6-0.8-no hydronephrosis on imaging-continue IV fluids monitor creatinine. .     -Moderate bilateral pleural effusions and compressive likely atelectasis due to metastatic ovarian malignancy  Noted similar findings on prior imaging at [facility]  BNP is not significantly elevated. .  Echo is pending     -chronic abdominal pain/discomfort related to peritoneal carcinomatosis ---continue pain regimen     -Reactive leukocytosis and thrombocytosis/microcytic anemia related to malignancy--chronic without evidence of infection-continue to monitor     -Recent UTI/bacteriuria-culture positive for Klebsiella 25 K CFUS recent admission-procalcitonin is elevated-complete course of  levofloxacin. .          -Obesity with BMI 34     -Anorexia/poor p.o. intake related to chronic nausea, pain and abdominal malignancy  Consulted dietitian to initiate TPN    DVT Prophylaxis: lovenox  Diet: ADULT DIET;  Clear Liquid  Code Status: Full Code  PT/OT Eval Status:     Dispo -           Julienne Lara MD

## 2023-01-18 NOTE — PROGRESS NOTES
ONCOLOGY HEMATOLOGY CARE PROGRESS NOTE      SUBJECTIVE:    She is feeling a little bit better today. GERD improved with Protonix. ABD distention and pain improved post paracentesis. She is agreeable to starting chemotherapy today. Has not had BM in 1 week. ROS:     Constitutional:  Positive weight loss, No fever, No chills, No night sweats.     Eyes:  No impairment or change in vision  ENT / Mouth:  No pain, abnormal ulceration, bleeding, nasal drip or change in voice or hearing  Cardiovascular:  No chest pain, palpitations, new edema, or calf discomfort  Respiratory:  No pain, hemoptysis, change to breathing  Breast:  No pain, discharge, change in appearance or texture  Gastrointestinal:  + pain, cramping, jaundice, +change to eating and bowel habits  Urinary:  No pain, bleeding or change in continence  Musculoskeletal:  No redness, pain, edema or weakness  Skin:  No pruritus, rash, change to nodules or lesions  Neurologic:  No discomfort, change in mental status, speech, sensory or motor activity  Psychiatric:  No change in concentration or change to affect or mood  Endocrine:  No hot flashes, increased thirst, or change to urine production  Hematologic: No petechiae, ecchymosis or bleeding  Lymphatic:  No lymphadenopathy or lymphedema  Allergy / Immunologic:  No eczema, hives, frequent or recurrent infections    OBJECTIVE        Physical    VITALS:  Patient Vitals for the past 24 hrs:   BP Temp Temp src Pulse Resp SpO2   01/18/23 0846 123/85 99.7 °F (37.6 °C) Axillary (!) 122 20 94 %   01/18/23 0720 -- -- -- -- 16 --   01/18/23 0339 134/81 98.7 °F (37.1 °C) Axillary (!) 114 18 93 %   01/17/23 2350 (!) 126/96 97 °F (36.1 °C) Oral (!) 118 18 95 %   01/17/23 2138 (!) 137/94 97 °F (36.1 °C) Oral (!) 115 18 --   01/17/23 1848 -- -- -- -- 16 --   01/17/23 1555 (!) 138/96 97.6 °F (36.4 °C) Axillary (!) 102 14 95 %   01/17/23 1350 -- -- -- -- 16 --   01/17/23 1103 (!) 152/105 97.5 °F (36.4 °C) Oral (!) 107 18 96 %       24HR INTAKE/OUTPUT:    Intake/Output Summary (Last 24 hours) at 1/18/2023 0919  Last data filed at 1/18/2023 0700  Gross per 24 hour   Intake 1656 ml   Output 725 ml   Net 931 ml       CONSTITUTIONAL: awake, alert, cooperative, no apparent distress, ill appearing   EYES: pupils equal, round and reactive to light, sclera clear and conjunctiva normal  ENT: Normocephalic, without obvious abnormality, atraumatic  NECK: supple, symmetrical, no jugular venous distension and no carotid bruits   HEMATOLOGIC/LYMPHATIC: no cervical, supraclavicular or axillary lymphadenopathy   LUNGS: no increased work of breathing and clear to auscultation   CARDIOVASCULAR: regular rate and rhythm, normal S1 and S2, no murmur noted  ABDOMEN: hypoactive bowel sounds x 4, soft, distended, non-tender, no masses palpated  MUSCULOSKELETAL: full range of motion noted, tone is normal  NEUROLOGIC: awake, alert, oriented to name, place and time. Motor skills grossly intact. SKIN: Pale skin color, texture, turgor and no jaundice. appears intact   EXTREMITIES: trace bilat LE edema     DATA:  CBC:    Recent Labs     01/18/23  0336 01/17/23  0401 01/16/23  0355 01/15/23  0445   WBC 19.7* 19.0* 16.2* 13.6*   NEUTROABS 17.2* 15.0* 13.8* 11.1*   LYMPHOPCT 5.5 5.0 7.3 9.1   RBC 5.20 4.96 5.14 5.27*   HGB 11.1* 10.7* 10.9* 11.0*   HCT 38.2 36.1 36.6 38.0   MCV 73.5* 72.8* 71.2* 72.2*   MCH 21.4* 21.7* 21.2* 20.8*   MCHC 29.1* 29.8* 29.8* 28.9*   RDW 30.4* 30.3* 30.6* 30.3*   * 704* 638* 739*       PT/INR:    Recent Labs     01/17/23  1226 01/17/23  1042 01/12/23  0945 01/10/23  2337   PROT  --  5.8*  --  6.6   INR 1.20*  --  1.60*  --      PTT:  No results for input(s): APTT in the last 720 hours.     CMP:    Recent Labs     01/18/23  0336 01/17/23  1042 01/17/23  0401 01/16/23  0355 01/15/23  0445 01/14/23  1142 01/10/23  2337     --  139 140 138   < > 140   K 4.6  --  4.3 4.1 4.7   < > 4.7     -- 99 99 93*   < > 100   CO2 24  --  27 28 30   < > 27   GLUCOSE 127*  --  104* 104* 100*   < > 130*   BUN 45*  --  44* 55* 65*   < > 31*   CREATININE 1.4*  --  1.4* 1.3* 1.6*   < > 1.2   LABGLOM 42*  --  42* 46* 36*   < > 50*   CALCIUM 8.4  --  8.7 8.5 9.2   < > 9.2   PROT  --  5.8*  --   --   --   --  6.6   LABALBU  --  2.6*  --   --   --   --  3.0*   AGRATIO  --   --   --   --   --   --  0.8*   BILITOT  --  <0.2  --   --   --   --  <0.2   ALKPHOS  --  111  --   --   --   --  153*   ALT  --  9*  --   --   --   --  6*   AST  --  15  --   --   --   --  12*    < > = values in this interval not displayed. Lab Results   Component Value Date    CALCIUM 8.4 01/18/2023       LDH:No results for input(s): LDH in the last 720 hours. Radiology Review:  US GUIDED PARACENTESIS  Narrative: HISTORY: Ascites    EXAM: ULTRASOUND GUIDED PARACENTESIS    TECHNIQUE: After discussing the risks, benefits, and alternatives to the procedure, oral and written consent was obtained. The patient was prepped draped in usual sterile fashion. Local anesthesia was provided with buffered lidocaine. Under ultrasound   guidance, a 5 [place] one-step catheter was introduced into the right peritoneal space by trocar technique. Approximately 800 mL of straw-colored fluid was obtained. No acute complication. Impression: 1. Successful ultrasound-guided paracentesis without complication. Problem List  Patient Active Problem List   Diagnosis    Failure to thrive in adult    Abdominal pain    Ascites    Microcytic anemia    Nausea and vomiting       ASSESSMENT AND PLAN:    1. Peritoneal carcinomatosis, ovarian malignancy.     -Biopsy of the right iliac lymph node returned positive for poorly differentiated adenocarcinoma. IHC studies suggestive of GYN origin, GATA3 focally positive. - initial imaging studies  revealed multiple omental lesions, right pelvic lymphadenopathy. The largest iliac lymph node noted was approximately 3 x 1.8 cm.  - CT scan of the chest was positive for moderate left-sided pleural effusion and small right-sided pleural effusion with no evidence of intrapulmonary or hilar/mediastinal lymphadenopathy. -CT A/P 1/16/23  with small to moderate ascites, ileus, enlarged pelvic mass   -Serum  on 1/6/2023 was elevated to  -1141.2  -Patient will need neoadjuvant chemotherapy--we discussed Carboplatin/Taxol regimen and plan to initiate treatment while inpatient. Side effects reviewed--she is agreeable   - discussed with Pharmacy and orders signed  - Palliative Care has been consulted   - plan to start cycle #1 day 1 Carbo/Taxol today  - Begin G-CSF tomorrow      2. Nausea/vomiting.  -Symptomatic management for now  - added Protonix for GERD   - Dietician evaluated for TPN yesterday  - Agree w/ starting TPN     3.  Abdominal distention.  - improved post paracentesis 1/17/23   - pain management switched to Dilaudid-her symptoms are better controlled    4. Microcytic anemia  Iron studies mixed   soluable transferrin pending   Hgb improved to 11.1 today       ONCOLOGIC DISPOSITION:    Following chemotherapy, when symptoms have improved     Saint Kraft, APRN - CNP  Please contact through [address]    Attending:  Pt seen & examined; data reviewed; case discussed w/ SHILPI Saint Kraft and agree with her note with my modification. We will begin chemotherapy in an effort to cytoreduce her tumor burden and improve symptoms.     Joseline Arenas MD

## 2023-01-18 NOTE — PROGRESS NOTES
Administration: Chemotherapy drug Paclitaxel independently verified with Nicole Sampson, MANPREET prior to administration. Acknowledgement of informed consent for chemotherapy administration verified. Original order, appropriateness of regimen, drug supplied, height, weight, BSA, dose calculations, expiration dates/times, drug appearance, and two patient identifiers were verified by both RNs. Drug checked for vesicant/irritant status and for risk of hypersensitivity. Most recent laboratory values and allergies, were reviewed. Positive, brisk blood return via CVC was confirmed prior to administration. Chest x-ray for correct line placement reviewed. Bettie Hinson RN and Nicole Sampson RN verified correct rate of chemotherapy and maintenance IV fluids. Patient was educated on chemotherapy regimen prior to administration including indication for treatment related to disease & side effects of chemotherapy drug. Patient verbalizes understanding of all instructions. Completion of Chemotherapy: Monitoring during infusion done per policy, see Flowsheets. Blood return verified before, during, and after infusion per policy; no signs of extravasation. Pt tolerating chemotherapy well and without incident. Chemotherapy infusion end time on the [facility]. Will continue to monitor.

## 2023-01-18 NOTE — CONSULTS
Comprehensive Nutrition Assessment    RECOMMENDATIONS:  PO Diet: Continue CLD per MD  ONS: Start Ensure Clear BID  Patient w/HIGH RISK of Refeeding. Start daily MV + 100 mg Thiamin for 7 days (start 1/17 - end 1/24). Add daily PO MV Tues/Thurs/Sat/Sun to supplement what pt will not receive via PN d/t  shortage. Day 1: Start PN Clinimix 5/20 at 41.7 ml/hr. Day 2: If no signs of refeeding, advance PN Clinimix 5/20 at 65 ml/hr. HOLD LIPIDS first 10 days d/t  shortage; if still on PN, start 250 ml of 20% lipids twice weekly on 1/28  Obtain Labs: Daily Phos,Mg,K+. Weekly TG recommended. Mg and Phos labs ordered 1/18; if low, rec'd replenishing prior to starting TPN  Pharmacy to adjust electrolytes, MVI and Trace Elements as appropriate. Nutrition Education: Education not indicated       NUTRITION ASSESSMENT:   Nutritional summary & status: +IP and Consult for TPN recs. Pt known to this RD from previous adm 1 week ago. Pt w/ recurrent ascites like 2/2 ileus. Pt unable to tolerate po diet for >2 weeks per pt report. Wt current up likely d/t ascites. Paracentesis pending. Recd smaller, more frequent meals once diet advanced, as well as avoiding carbonated beverages, and avoiding drinking w/ meals. Currently on CLD and agreeable to ONS. Will order. TPN recs above. Admission/PMH: ovarian neoplasm with peritoneal, omental metastases with ascites presenting with intractable nausea and vomiting    MALNUTRITION ASSESSMENT  Context of Malnutrition: Chronic Illness   Malnutrition Status:  At risk for malnutrition (Comment)  Findings of the 6 clinical characteristics of malnutrition (Minimum of 2 out of 6 clinical characteristics is required to make the diagnosis of moderate or severe Protein Calorie Malnutrition based on AND/ASPEN Guidelines):  Energy Intake:  75% or less estimated energy requirements for 1 month or longer  Weight Loss:  Unable to assess (wt flucuation d/t ascites)     Body Fat Loss:  No significant body fat loss     Muscle Mass Loss:  No significant muscle mass loss        NUTRITION DIAGNOSIS   Inadequate oral intake related to altered GI structure as evidenced by nutrition support - parenteral nutrition    Nutrition Monitoring and Evaluation:   Food/Nutrient Intake Outcomes:  Food and Nutrient Intake, Supplement Intake  Physical Signs/Symptoms Outcomes:  Biochemical Data, GI Status, Nutrition Focused Physical Findings, Weight     OBJECTIVE DATA: Significant to nutrition assessment  Nutrition Related Findings: No Mg or Phos ordered; ascites (paracentesis pending); trace BLE edema  Wounds: None  Nutrition Goals: Meet at least 75% of estimated needs, by next RD assessment     CURRENT NUTRITION THERAPIES  Current Parenteral Nutrition Orders:  Goal PN Orders Provides: Clinimix 5/20 @ 65ml/h to provide 1560ml TV, 1373kcal, and 78g pro. GIR= 2.28mg/kg/min  PO Intake: Unable to assess   PO Supplement Intake:Unable to assess  Additional Sources of Calories/IVF:NS @ 125ml/h     ANTHROPOMETRICS  Current Height: 5' 3\" (160 cm)  Current Weight: 209 lb 7 oz (95 kg)    Admission weight: 195 lb (88.5 kg)  Ideal Body Weight (IBW): 115 lbs  (52 kg)    BMI: 37.2    COMPARATIVE STANDARDS  Energy (kcal):  3666-6581     Protein (g):  62-72       Fluid (mL/day):  1ml/kcal    The patient will be monitored per nutrition standards of care. Consult dietitian if additional nutrition interventions are needed prior to RD reassessment.      Chaz Lui [address], [address] [place] [address]  Anjmu:  [phone]  Office:  [phone]

## 2023-01-18 NOTE — PROGRESS NOTES
Pt rested well since switched to Dilaudid for pain. Pt has been getting PRN nausea meds throughout shift. At 4am pt only had 200ml urine out. Bladder scanned pt and showed 230ml. Notified Dr Kvng Duggan, and she said to recheck in a couple hours to see if pt can continue to void on her own.

## 2023-01-18 NOTE — PROGRESS NOTES
Hospitalist Progress Note      PCP: Pcp No    Date of Admission: 1/14/2023    Chief Complaint:     Hospital Course:     Subjective:     Patient states she feels better today. No vomiting,, no BMs for several days    Medications:  Reviewed    Infusion Medications    sodium chloride 125 mL/hr at 01/17/23 2350    sodium chloride 5 mL/hr at 01/14/23 2019     Scheduled Medications    thiamine  100 mg IntraVENous Daily    pantoprazole  40 mg IntraVENous Daily    polyethylene glycol  17 g Oral Daily    sodium chloride flush  5-40 mL IntraVENous 2 times per day    enoxaparin  40 mg SubCUTAneous Daily    levofloxacin  500 mg IntraVENous Q48H     PRN Meds: [START ON 1/19/2023] ondansetron **OR** [START ON 1/19/2023] ondansetron, HYDROmorphone, oxyCODONE, sodium chloride flush, sodium chloride, polyethylene glycol, acetaminophen **OR** acetaminophen, promethazine      Intake/Output Summary (Last 24 hours) at 1/18/2023 0934  Last data filed at 1/18/2023 0700  Gross per 24 hour   Intake 1656 ml   Output 725 ml   Net 931 ml         Physical Exam Performed:    /85   Pulse (!) 122   Temp 99.7 °F (37.6 °C) (Axillary)   Resp 20   Ht 5' 3\" (1.6 m)   Wt 209 lb 7 oz (95 kg)   SpO2 94%   BMI 37.10 kg/m²     General appearance: No apparent distress, appears stated age and cooperative. HEENT: Pupils equal, round, and reactive to light. Conjunctivae/corneas clear. Neck: Supple, with full range of motion. No jugular venous distention. Trachea midline. Respiratory:  Normal respiratory effort. Clear to auscultation, bilaterally without Rales/Wheezes/Rhonchi. Cardiovascular: Regular rate and rhythm with normal S1/S2 without murmurs, rubs or gallops. Abdomen: Soft, non-tender, firm, distended with decreased bowel sounds. Musculoskeletal: No clubbing, cyanosis or edema bilaterally. Full range of motion without deformity. Skin: Skin color, texture, turgor normal.  No rashes or lesions.   Neurologic:  Neurovascularly intact without any focal sensory/motor deficits. Cranial nerves: II-XII intact, grossly non-focal.  Psychiatric: Alert and oriented, thought content appropriate, normal insight  Capillary Refill: Brisk, 3 seconds, normal   Peripheral Pulses: +2 palpable, equal bilaterally       Labs:   Recent Labs     01/16/23  0355 01/17/23  0401 01/18/23  0336   WBC 16.2* 19.0* 19.7*   HGB 10.9* 10.7* 11.1*   HCT 36.6 36.1 38.2   * 704* 666*       Recent Labs     01/16/23  0355 01/17/23  0401 01/18/23  0336    139 144   K 4.1 4.3 4.6   CL 99 99 108   CO2 28 27 24   BUN 55* 44* 45*   CREATININE 1.3* 1.4* 1.4*   CALCIUM 8.5 8.7 8.4       Recent Labs     01/17/23  1042   AST 15   ALT 9*   BILIDIR <0.2   BILITOT <0.2   ALKPHOS 111     Recent Labs     01/17/23  1226   INR 1.20*     No results for input(s): CKTOTAL, TROPONINI in the last 72 hours. Urinalysis:      Lab Results   Component Value Date/Time    NITRU Negative 01/14/2023 01:55 PM    [address] 0-2 01/14/2023 01:55 PM    BACTERIA 1+ 01/14/2023 01:55 PM    RBCUA 11-20 01/14/2023 01:55 PM    BLOODU LARGE 01/14/2023 01:55 PM    SPECGRAV >=1.030 01/14/2023 01:55 PM    GLUCOSEU Negative 01/14/2023 01:55 PM       Radiology:  US GUIDED PARACENTESIS   Final Result   1. Successful ultrasound-guided paracentesis without complication. CT ABDOMEN PELVIS WO CONTRAST Additional Contrast? Oral   Final Result   1. Diffuse small bowel distention essentially to the ileocecal valve most indicative of ileus. 2. Enlarged pelvic mass again noted. 3. Small to moderate volume ascites. Moderate bilateral pleural effusions. XR CHEST PORTABLE   Final Result   1. Cardiomegaly with probable effusions and basilar airspace disease suggesting pulmonary edema and atelectasis. Superimposed pneumonia is not excluded. XR ABDOMEN (KUB) (SINGLE AP VIEW)   Final Result   1. Nonspecific bowel gas pattern suggestive for mild degree of ileus   2.  At least moderate ostial arthritis right hip          IP CONSULT TO HOSPITALIST  IP CONSULT TO ONCOLOGY  IP CONSULT TO DIETITIAN  IP CONSULT TO PALLIATIVE CARE  IP CONSULT TO PHARMACY    Assessment/Plan:    Intractable nausea and vomiting, abdominal pain, due to ovarian malignancy with peritoneal carcinomatosis with ascites,mild small bowel ileus   Continue IV fluids, antiemetics, clear liquid diet. .    CT abdomen/pelvis 1/17 reviewed--shows small bowel ileus, small to moderate moderate ascites, moderate pleural effusions  Continue bowel regimen, advance diet to full liquid  S/p Ultrasound-guided thoracentesis -800ml  C/w Pain control  Follow-up with oncology  Palliative care consult appreciated     -Metastatic ovarian cancer -large ovarian mass, Peritoneal carcinomatosis with ascites, small pleural effusions likely metastatic ovarian cancer. .    S/p CT-guided biopsy of right iliac lymph node 1/12--  Path positive for poorly differentiated adenocarcinoma. IHC studies suggestive of GYN origin  Discussed with oncology. .  Plan to initiate chemotherapy carboplatin/Taxol today         -Acute kidney injury, prerenal--creatinine elevated to 1.4-1.6, baseline creatinine 0.6-0.8-no hydronephrosis on imaging-continue IV fluids monitor creatinine. .  Berger inserted given low urine output for close monitoring    -Moderate bilateral pleural effusions and compressive likely atelectasis due to metastatic ovarian malignancy  Noted similar findings on prior [facility] at [facility]  BNP is not significantly elevated. .  Echo is pending     -chronic abdominal pain/discomfort related to peritoneal carcinomatosis ---continue pain regimen     -Reactive leukocytosis and thrombocytosis/microcytic anemia related to malignancy--chronic without evidence of infection-continue to monitor     -Recent UTI/bacteriuria-culture positive for Klebsiella 25 K CFUS recent admission-procalcitonin is elevated-complete course of  levofloxacin 1/18/23. .          -Obesity with BMI 34     -Anorexia/poor p.o. intake related to chronic nausea, pain and abdominal malignancy  TPN to start today    DVT Prophylaxis: lovenox  Diet: ADULT DIET; Clear Liquid  ADULT ORAL NUTRITION SUPPLEMENT; Breakfast, Dinner; Clear Liquid Oral Supplement  Code Status: Full Code  PT/OT Eval Status:     Dispo -patient will likely need placement. Rickey Pierson PT and OT          Caryn Salmon MD

## 2023-01-18 NOTE — CONSULTS
[facility]  Palliative Medicine Consultation Note      Date Of Admission:1/14/2023  Date of consult: 01/18/23  Seen by [facility] in the past:  No    Recommendations:        Met with pt and introduced palliative care. She c/o pain, dry mouth, and urinary retention. She says she is absolutely miserable. She reports her friend Bela Greene is her HCPOA and directed me to call her 's office to obtain a copy. With pt's permission, I called Bela Greene and introduced palliative care. Bela Greene asked questions about the stage of pt's cancer and her overall prognosis. D/w APRN Lynn Infante and obtained a copy of pt's HCPOA and placed it on her chart. Friend Nicholas Kussmaul is pt's agent and friend Jaky Benson is alternate agent. 1. Goals of Care/Advanced Care planning/Code status: Full Code, did not discuss today. Pt hopes to start feeling better and is in agreement with starting chemotherapy today. 2. Pain: c/o pain 10/10 in generalized abdomen. RN to give dilaudid. Would give prn oxycodone along with dilaudid. D/w RN. Pain is likely from ovarian cancer with peritoneal mets and ascites. 3. SOB: denies sob and is breathing comfortably on 2.5 L.   4. Nausea/vomiting: denies nausea at this time. Continue phenergan prn.  5. Disposition: TBD, may need SNF since she lives alone    Reason for Consult:         [x]  Goals of Care  [x]  Code Status Discussion/Advanced Care Planning   []  Psychosocial/Family Support  []  Symptom Management  []  Other (Specify)    Requesting Physician: Dr. Adrián Holly:  Intractable nausea and vomiting    History Obtained From:  patient, non-family caregiver - friend Bela Greene, electronic medical record    History of Present Illness:         Angely Calderon is a 57 y.o. female with PMH of recently diagnosed ovarian neoplasm with peritoneal, omental metastases with ascites who presented with intractable nausea and vomiting.  She's admitted with intractable nausea and vomiting secondary to peritoneal carcinomatosis with mild ileus on KUB. Biopsy of the right iliac lymph node returned positive for poorly differentiated adenocarcinoma. Oncology was consulted and plan on starting chemotherapy tomorrow. She was recently admitted to this hospital from 1/10-1/12 with abdominal pain following an outpatient paracentesis. She underwent a CT-guided biopsy of the ovarian mass on 1/12 and was discharged home. She had an abnormal UA and was given levofloxacin empirically at discharge. Subjective:         Past Medical History:        Diagnosis Date    Ascites     Ovarian neoplasm        Past Surgical History:        Procedure Laterality Date    CT BIOPSY LYMPH NODES SUPERFICIAL  1/12/2023    CT BIOPSY LYMPH NODES SUPERFICIAL 1/12/2023 TJ CT SCAN       Current Medications:    Medications Prior to Admission: oxyCODONE-acetaminophen (PERCOCET) 5-325 MG per tablet, Take 1 tablet by mouth every 6 hours as needed for Pain for up to 7 days. Intended supply: 3 days. Take lowest dose possible to manage pain Max Daily Amount: 4 tablets  docusate sodium (COLACE) 100 MG capsule, Take 1 capsule by mouth 2 times daily as needed for Constipation  ondansetron (ZOFRAN-ODT) 4 MG disintegrating tablet, Take 1 tablet by mouth 3 times daily as needed for Nausea or Vomiting  levoFLOXacin (LEVAQUIN) 500 MG tablet, Take 1 tablet by mouth daily for 7 days    Allergies:  Azithromycin and Amoxicillin    Social History:    TOBACCO: reports that she has never smoked. She has never used smokeless tobacco.  ETOH:   reports current alcohol use. Patient currently lives alone    Review of Systems -   Review of Systems   Constitutional:  Positive for fatigue. HENT: Negative. Respiratory: Negative. Cardiovascular: Negative. Gastrointestinal:  Positive for abdominal pain and nausea. Genitourinary: Negative. Musculoskeletal: Negative. Neurological:  Positive for weakness. Psychiatric/Behavioral: Negative. :     Objective:        Physical Exam  Constitutional:       General: She is not in acute distress. HENT:      Head: Normocephalic and atraumatic. Cardiovascular:      Rate and Rhythm: Regular rhythm. Tachycardia present. Pulmonary:      Effort: Pulmonary effort is normal.      Breath sounds: Normal breath sounds. Abdominal:      General: There is distension. Palpations: Abdomen is soft. Musculoskeletal:         General: No swelling. Skin:     General: Skin is warm and dry. Neurological:      Mental Status: She is alert and oriented to person, place, and time. Palliative Performance Scale:  [] 60% Ambulation reduced; Significant disease; Can't do hobbies/housework; intake normal or reduced; occasional assist; LOC full/confusion  [] 50% Mainly sit/lie; Extensive disease; Can't do any work; Considerable assist; intake normal  Or reduced; LOC full/confusion  [x] 40% Mainly in bed; Extensive disease; Mainly assist; intake normal or reduced; occasional assist; LOC full/confusion  [] 30% Bed Bound; Extensive disease; Total care; intake reduced; LOC full/confusion  [] 20% Bed Bound; Extensive disease; Total care; intake minimal; Drowsy/coma  [] 10% Bed Bound; Extensive disease;  Total care; Mouth care only; Drowsy/coma  [] 0% Death      Vitals:    /81   Pulse (!) 114   Temp 98.7 °F (37.1 °C) (Axillary)   Resp 16   Ht 5' 3\" (1.6 m)   Wt 209 lb 7 oz (95 kg)   SpO2 93%   BMI 37.10 kg/m²     Labs:    BMP:   Recent Labs     01/16/23  0355 01/17/23  0401 01/18/23  0336    139 144   K 4.1 4.3 4.6   CL 99 99 108   CO2 28 27 24   BUN 55* 44* 45*   CREATININE 1.3* 1.4* 1.4*   GLUCOSE 104* 104* 127*     CBC:   Recent Labs     01/16/23  0355 01/17/23  0401 01/18/23  0336   WBC 16.2* 19.0* 19.7*   HGB 10.9* 10.7* 11.1*   HCT 36.6 36.1 38.2   * 704* 666*       LFT's:   Recent Labs     01/17/23  1042   AST 15   ALT 9*   BILITOT <0.2   ALKPHOS 111     Troponin: No results for input(s): TROPONINI in the last 72 hours. BNP: No results for input(s): BNP in the last 72 hours. ABGs: No results for input(s): PHART, REZ9BEL, PO2ART in the last 72 hours. INR:   Recent Labs     01/17/23  1226   INR 1.20*       U/A:No results for input(s): NITRITE, COLORU, PHUR, LABCAST, WBCUA, RBCUA, MUCUS, TRICHOMONAS, YEAST, BACTERIA, CLARITYU, SPECGRAV, LEUKOCYTESUR, UROBILINOGEN, BILIRUBINUR, BLOODU, GLUCOSEU, AMORPHOUS in the last 72 hours. Invalid input(s): KETONESU    US GUIDED PARACENTESIS   Final Result   1. Successful ultrasound-guided paracentesis without complication. CT ABDOMEN PELVIS WO CONTRAST Additional Contrast? Oral   Final Result   1. Diffuse small bowel distention essentially to the ileocecal valve most indicative of ileus. 2. Enlarged pelvic mass again noted. 3. Small to moderate volume ascites. Moderate bilateral pleural effusions. XR CHEST PORTABLE   Final Result   1. Cardiomegaly with probable effusions and basilar airspace disease suggesting pulmonary edema and atelectasis. Superimposed pneumonia is not excluded. XR ABDOMEN (KUB) (SINGLE AP VIEW)   Final Result   1. Nonspecific bowel gas pattern suggestive for mild degree of ileus   2. At least moderate ostial arthritis right hip            Conclusion/Time spent:         Recommendations see above    Pt [id number], POA [id number], [id number]  Time spent with patient and/or family: 45 min  Time reviewing records: 10 min   Time communicating with staff: 10 min     A total of 65 minutes spent with the patient and family on unit greater than 50% in counseling regarding palliative care and in goals of care for the patient. Thank you to Dr. Apollo Anderson for this consultation. We will continue to follow Ms. Avendano's care as needed.     Rima Ricardo, CASEY  [id number]

## 2023-01-18 NOTE — CONSULTS
Clinical Pharmacy Progress Note    TPN - Management by Pharmacy    Consult Date(s): 1/18  Consulting Provider(s): Dr Andreea Jaimes    Assessment / Plan  Poor PO intake 2/2 ileus and intractable n/v - Parenteral Nutrition  Day of Therapy: 1  Formulation:  Clinimix E 5/20  Clinimix Rate:  41.7 mL/hr (Total volume = 1000 mL/day)  Per RD note on 1/17, will step up to goal rate 65 mL/hr if no signs of refeeding on day #2. Lipids:  Hold for 10 days, then start 1/23 Lipids 20% 250mL over 12 hours on Mon & Thurs only (due to [employer])  Appreciate Clinical Dietitian's recommendations for formulation and goal rate. Electrolytes:  Clinimix-E includes standard electrolyte formulation. Recommend further repletion with supplemental IVPBs as needed. Glucose control:  No hx of DM. BG currently < 150 on daily labs. Will monitor as TPN is initiated. Add MVI 10 mL/day in PN on Mon-Wed-Fri only (due to national shortage) & Trace Elements 1 mL/day in PN daily. Daily renal panel, daily magnesium, and weekly triglycerides ordered per protocol. PN will be re-ordered daily. Thank you for consulting Pharmacy! Jax ValenzuelaD., BCPS   1/18/2023 12:34 PM  Wireless: [phone]          Subjective/Objective:   Tommie Shepherd is a 57 y.o. female with a PMHx significant for recently diagnosed ovarian neoplasm with peritoneal carcinomatosis, ascites, chronic nausea/vomiting, and ileus. Admitted with intractable N/V, with plans to start neoadjuvant chemotherapy on 1/18 as well as TPN. Pharmacy is consulted to dose TPN.     Ht Readings from Last 1 Encounters:   01/14/23 5' 3\" (1.6 m)     Wt Readings from Last 1 Encounters:   01/17/23 209 lb 7 oz (95 kg)     Recent Labs     01/17/23  0401 01/18/23  0336 01/18/23  0958    144  --    K 4.3 4.6  --    CL 99 108  --    CO2 27 24  --    PHOS  --   --  3.6   GLUCOSE 104* 127*  --    BUN 44* 45*  --    CREATININE 1.4* 1.4*  --    CALCIUM 8.7 8.4  --    MG  --   --  2.20 Albumin   Date Value Ref Range Status   01/17/2023 2.6 (L) 3.4 - 5.0 g/dL Final     Corrected Calcium: 9.5 g/dL    No results for input(s): POCGLU in the last 72 hours.   Sliding scale insulin used since PN bag hung yesterday: N/A (no insulin ordered)    Recent Labs     01/17/23  0401 01/18/23  0336   WBC 19.0* 19.7*   HGB 10.7* 11.1*   * 666*     No results found for: TRIG

## 2023-01-18 NOTE — PROGRESS NOTES
Original chemotherapy orders reviewed and acknowledged. Appropriateness of chemotherapy treatment regimen carboplatin/taxol for diagnosis of Peritoneal carcinomatosis, ovarian malignancy was verified. Patient educated on chemotherapy regimen. Consent for chemotherapy obtained. Estimated body surface area is 2.05 meters squared as calculated from the following:    Height as of this encounter: 5' 3\" (1.6 m). Weight as of this encounter: 209 lb 7 oz (95 kg). verified. Appropriate dosing calculations of chemotherapy based on above height, weight, and BSA verified.       Jerry Lara RN1/18/2023  1:30 PM

## 2023-01-19 PROBLEM — R09.02 HYPOXEMIA: Status: ACTIVE | Noted: 2023-01-01

## 2023-01-19 PROBLEM — N17.9 AKI (ACUTE KIDNEY INJURY) (HCC): Status: ACTIVE | Noted: 2023-01-01

## 2023-01-19 PROBLEM — K56.600 PARTIAL SMALL BOWEL OBSTRUCTION (HCC): Status: ACTIVE | Noted: 2023-01-01

## 2023-01-19 PROBLEM — E87.5 HYPERKALEMIA: Status: ACTIVE | Noted: 2023-01-01

## 2023-01-19 PROBLEM — C56.9 MALIGNANT NEOPLASM OF OVARY (HCC): Status: ACTIVE | Noted: 2023-01-01

## 2023-01-19 PROBLEM — E87.20 ACIDOSIS: Status: ACTIVE | Noted: 2023-01-01

## 2023-01-19 PROBLEM — C76.2 ABDOMINAL CARCINOMATOSIS (HCC): Status: ACTIVE | Noted: 2023-01-01

## 2023-01-19 NOTE — CONSULTS
[address] [place] [address]  GI Consultation      Patient: Francisco Hancock  : 1960       Date:  2023    Subjective:       History of Present Illness  Patient is a 57 y.o.  female admitted with Nausea and vomiting [R11.2]  Nausea and vomiting, unspecified vomiting type [R11.2] who is seen in consult for hematemesis, gastric ulcers. Ms. Mikael Masters is a 57year old female with PMH significant for ovarian neoplasm with peritoneal, omental metastases with ascites who presented to the emergency department on  with complaints of intractable nausea and vomiting. Patient had recently been admitted 1/10- with complaints of abdominal pain following an outpatient paracentesis. She underwent a CT-guided biopsy of the ovarian mass on  and was discharged home. .  She had an abnormal UA and was given levofloxacin empirically at discharge, cultures grew Klebsiella 25K CFUS. Patient had been relatively stable during her admission until last night when she spiked a fever to 103 with an episode of hematemesis. KUB was ordered which showed a small bowel obstruction unchanged from . CTA abdomen pelvis ordered and currently pending. Past Medical History:   Diagnosis Date    Ascites     Ovarian neoplasm       Past Surgical History:   Procedure Laterality Date    CT BIOPSY LYMPH NODES SUPERFICIAL  2023    CT BIOPSY LYMPH NODES SUPERFICIAL 2023 [facility] CT SCAN      Past Endoscopic History does not appear to have had an EGD or colonoscopy in the past.    Admission Meds  No current facility-administered medications on file prior to encounter. Current Outpatient Medications on File Prior to Encounter   Medication Sig Dispense Refill    oxyCODONE-acetaminophen (PERCOCET) 5-325 MG per tablet Take 1 tablet by mouth every 6 hours as needed for Pain for up to 7 days. Intended supply: 3 days.  Take lowest dose possible to manage pain Max Daily Amount: 4 tablets 28 tablet 0    docusate sodium (COLACE) 100 MG capsule Take 1 capsule by mouth 2 times daily as needed for Constipation 60 capsule 0    ondansetron (ZOFRAN-ODT) 4 MG disintegrating tablet Take 1 tablet by mouth 3 times daily as needed for Nausea or Vomiting 21 tablet 0    levoFLOXacin (LEVAQUIN) 500 MG tablet Take 1 tablet by mouth daily for 7 days 7 tablet 0       Patient denies ASA, NSAID use. Allergies  Allergies   Allergen Reactions    Azithromycin     Amoxicillin Rash      Social   Social History     Tobacco Use    Smoking status: Never    Smokeless tobacco: Never   Substance Use Topics    Alcohol use: Yes     Comment: rarely        Family History   Adopted: Yes   Family history unknown: Yes      No family history of colon cancer, Crohn's disease, or ulcerative colitis. Review of Systems  Pertinent items are noted in HPI.        Physical Exam    /75   Pulse (!) 106   Temp (!) 100.6 °F (38.1 °C) (Core)   Resp 19   Ht 5' 3\" (1.6 m)   Wt 213 lb 10 oz (96.9 kg)   SpO2 96%   BMI 37.84 kg/m²   General appearance: alert, cooperative, no distress, appears stated age  Anicteric, No Jaundice  Head: Normocephalic, without obvious abnormality  Lungs: clear to auscultation bilaterally, Normal Effort  Heart: regular rate and rhythm, normal S1 and S2, no murmurs or rubs  Abdomen: abnormal findings:  tenderness moderate in the lower abdomen  Extremities: atraumatic, no cyanosis or edema  Skin: warm and dry  Neuro: intact  AAOX3      Data Review:    Recent Labs     01/18/23  0336 01/18/23  2313 01/19/23  0204 01/19/23  0350   WBC 19.7*  --  25.4* 22.9*   HGB 11.1* 10.6* 10.2* 9.9*   HCT 38.2  --  35.7* 35.4*   MCV 73.5*  --  76.3* 76.7*   *  --  452* 448     Recent Labs     01/17/23  0401 01/18/23  0336 01/18/23  0958 01/19/23  0350    144  --  144   K 4.3 4.6  --  6.1*   CL 99 108  --  114*   CO2 27 24  --  17*   PHOS  --   --  3.6 4.5   BUN 44* 45*  --  62*   CREATININE 1.4* 1.4*  --  2.2*     Recent Labs     01/17/23  1042   AST 15 ALT 9*   BILIDIR <0.2   BILITOT <0.2   ALKPHOS 111     No results for input(s): LIPASE, AMYLASE in the last 72 hours. Recent Labs     01/17/23  1226   PROTIME 15.2*   INR 1.20*     No results for input(s): PTT in the last 72 hours. No results for input(s): OCCULTBLD in the last 72 hours. Imaging Studies:                            Ultrasound: Has had ultrasound-guided paracentesis may benefit              CT-scan of abdomen and pelvis: January 16, 2023    1. Diffuse small bowel distention essentially to the ileocecal valve most indicative of ileus. 2. Enlarged pelvic mass again noted. 3. Small to moderate volume ascites. Moderate bilateral pleural effusions. Assessment:     Principal Problem:    Nausea and vomiting  Resolved Problems:    * No resolved hospital problems. *      Hematemesis    Recommendations:     -Hemoglobin is currently stable  -No active bleeding at this time  -CTA abdomen pelvis pending    -Plan for EGD tomorrow 1/20  -Discussed plan with patient's VINCENZO Kendall  -N.p.o. at midnight    Will discuss with attending physician Dr. Jared Marin      Thank you for the opportunity to participate in Judith Avendano's care.     \

## 2023-01-19 NOTE — PROGRESS NOTES
Informed by the RN that patient is running a fever of 101.4 °F with sinus tachycardia ranging from 120-147 tachypnea with respiratory rate up to 25 and is on 2.5 L of oxygen saturation at 96% . Patient has just received the first cycle of Taxol/carbo this evening for ovarian malignancy with peritoneal carcinomatosis    Discussed with RN over the phone and ordered Tylenol 1 g for one-time. Callback from RN when she went to give the Tylenol patient has thrown up blood x1    Patient seen at the bedside  Awake and alert, weak but able to converse  States that she has had vomiting blood in the past due to stomach ulcer    Will keep patient n.p.o. Will discuss with pharmacy regarding IV paracetamol x1 (discussed with the pharmacist and no IV paracetamol available in our formulary)  Will use Tylenol suppositories for symptomatic therapy  Monitor CBC  Hold Lovenox  Protonix infusion  GI consult in a.m.   Discussed with RN and the patient

## 2023-01-19 NOTE — CONSULTS
Clinical Pharmacy Progress Note    TPN - Management by Pharmacy    Consult Date(s): 1/18  Consulting Provider(s): Dr Sotero Cassidy / Plan  Poor PO intake 2/2 ileus and intractable n/v - Parenteral Nutrition  PN bag ordered for 1/18 PM was never started; patient had new onset fever. Discussed with Dr Kylah Tse; Pharmacy will sign off consult today. Please re-consult Pharmacy if further assistance with TPN desired. Thank you for consulting Pharmacy! Bety Arthur PharmD., BCPS   1/19/2023 12:01 PM  Wireless: 2-1818        Interval update:  Patient initiated on chemo on 1/18. Shortly after, patient was febrile to 101.4 F, with tachycardia, new O2 requirement, and hematemesis dark in color. EGD planned for today. TPN bag never hung on 1/18. Patient hyperkalemic this AM.     Subjective/Objective:   Tommy Ojeda is a 57 y.o. female with a PMHx significant for recently diagnosed ovarian neoplasm with peritoneal carcinomatosis, ascites, chronic nausea/vomiting, and ileus. Admitted with intractable N/V, with plans to start neoadjuvant chemotherapy on 1/18 as well as TPN. Pharmacy is consulted to dose TPN.     Ht Readings from Last 1 Encounters:   01/14/23 5' 3\" (1.6 m)     Wt Readings from Last 1 Encounters:   01/19/23 213 lb 10 oz (96.9 kg)     Recent Labs     01/18/23  0958 01/19/23  0350 01/19/23  1105   NA  --  144 144   K  --  6.1* 4.5   CL  --  114* 112*   CO2  --  17* 16*   PHOS 3.6 4.5 4.0   GLUCOSE  --  122* 157*   BUN  --  62* 73*   CREATININE  --  2.2* 2.2*   CALCIUM  --  7.3* 6.9*   MG 2.20 2.20  --        Albumin   Date Value Ref Range Status   01/19/2023 2.2 (L) 3.4 - 5.0 g/dL Final     Corrected Calcium: 9.5 g/dL    Recent Labs     01/18/23  1721 01/19/23  1015 01/19/23  1040 01/19/23  1108   POCGLU 111* 112* 212* 142*     Sliding scale insulin used since PN bag hung yesterday: N/A (no insulin ordered)    Recent Labs     01/19/23  0204 01/19/23  0350   WBC 25.4* 22.9*   HGB 10.2* 9.9* * 448       No results found for: TRIG

## 2023-01-19 NOTE — PROGRESS NOTES
01/19/23 0956   Encounter Summary   Encounter Overview/Reason  Initial Encounter   Service Provided For: Patient   Referral/Consult From: [address] Family members   Last Encounter    (es 1/19)   Complexity of Encounter Moderate   Begin Time 0928   End Time  0938   Total Time Calculated 10 min   Assessment/Intervention/Outcome   Assessment Calm;Coping   Intervention Active listening;Prayer (assurance of)/[place]   Outcome Engaged in conversation;Receptive   Plan and Referrals   Plan/Referrals Other (Comment)  (as  needed)

## 2023-01-19 NOTE — PROGRESS NOTES
Physician Progress Note      Estrella SALDAÑA #:                  [id number]  :                       1960  ADMIT DATE:       2023 11:12 AM  [address] [place] DATE:  RESPONDING  PROVIDER #:        Cheli Hernandez MD          QUERY TEXT:    Pt admitted with emesis and abdominal pain 2/2 malignancy. Pt noted to have   possible sepsis per 23 IM progress note. If possible, please document in   progress notes and discharge summary the present on admission status of   sepsis:    The medical record reflects the following:  Risk Factors: 57year old female w/ ovarian malignancy with peritoneal   carcinomatosis  Clinical Indicators: On admission-  WBC 17.7, procal 1.10, .    T 101.4, RR 33, , WBC 19.7. Per  IM progress note- Fevers/ possible   early sepsis: pt started empirically on levaquin overnight. Treatment: Labs, imaging, IVF, Tylenol, IV Levaquin  Options provided:  -- Yes, sepsis was present at the time of the order to admit to the hospital  -- No, sepsis was not present on admission and developed during the inpatient   stay  -- Other - I will add my own diagnosis  -- Disagree - Not applicable / Not valid  -- Disagree - Clinically unable to determine / Unknown  -- Refer to Clinical Documentation Reviewer    PROVIDER RESPONSE TEXT:    No, sepsis was not present on admission and developed during the inpatient   stay. Query created by:  Nanci Crawford on 2023 1:57 PM      Electronically signed by:  Cheli Hernandez MD 2023 2:02 PM all other ROS negative except as per HPI

## 2023-01-19 NOTE — PROGRESS NOTES
Got a call regarding  patient spiking fever, 103F with an episode of hematemesis. ON exam, the patient had extremely tender abdomen, she reported she had not had a BM since 7 days but reports passing gas. Bowel sounds on exam were reduced. Vitals , sbp 90s-100s, SpO2 90% on 3L. I ordered tylenol and cold compresses for her fever. Ordered a stat HB which was 10.6, unchanged from the prior. KUB reveled small bowel obstruction unchanged from from 1/14. WBC 25. LA 1.2, decreased UO in past 25hr approx 500 ml, Cr 2.2  I wanted to order CTA abd pelvis for concern for small bowel obstruction, but held at this time as patient has NICO.     Latoya Camejo MD

## 2023-01-19 NOTE — CARE COORDINATION
9:06 AM  Notified by palliative care yesterday that patient may require SNF placement. Will assist with discharge planning once patient has been able to work with therapy and appropriate recommendations are in.

## 2023-01-19 NOTE — PROGRESS NOTES
Notified Dr Vincent Banks about patient O2 demand increasing. Pt is now on 4.5L and sats are between 88-90%. Also let her know that pts temp is now 104 core temp after receiving suppository at 00:10 and keeping down 500mg after her vomiting around 22:00. Pt also has 5 ice packs scattered on her.

## 2023-01-19 NOTE — PROGRESS NOTES
Pt had become more lethargic, pt is able to open her eyes when you say her name, but she is no longer following commands. Fever is still 104 with ice packs. Pt is also up to 5L sats are 93%. Notified Dr Robert Dave of changes, and she came bedside for eval. Pt is transferring to ICU room.

## 2023-01-19 NOTE — PROGRESS NOTES
Palliative Care Chart Review  and Check in Note:     NAME:  Judith Suárez  Admit Date: 1/14/2023  Hospital Day:  Hospital Day: 6   Current Code status: DNR-CCA    Palliative care is continuing to following Ms. Estelle Suárez for symptom management,  and goals of care discussion as needed. Patient's chart reviewed today 1/19/23. Met with pt's Kelsy Melton to answer questions and discuss code status. Anusha Fisher reports pt has been very clear she would not want resuscitation if her heart stops, however is unsure about short term intubation. In the mean time will make pt a DNRCCA at her request. We discussed plans for NGT per surgery, which would be reasonable as a comfort measure. Anusha Fisher is OK with EGD tomorrow. Anusha Fisher does not want to do anything that would cause pt a significant amount of pain or impair her quality of life. The following are the currently established goals/code status, and Symptom management. Goals of care: Continue with current management, pt started on chemo this admission. Pt's HCPOA has a low threshold to consider comfort measures if pt declines significantly.      Code status: [facility]    Discharge plan: TBD pending hospital course      EMILI Schreiber CNP  01/19/23  1:37 PM

## 2023-01-19 NOTE — PROGRESS NOTES
Hospitalist Progress Note      PCP: Pcp No    Date of Admission: 1/14/2023    Chief Complaint:     Hospital Course:     Subjective:       Called to see pt due to concerns with hematemesis overnight, fevers, possible SBP. On arrival to room, Dr. Dina Holman was at bedside, seeing pt and discussing with friend/ POA. Per RN pt was evaluated by ICU team overnight but not clear if intention is to transfer status to ICU at this time. Pt is ill appearing, denies pain when asked. Medications:  Reviewed    Infusion Medications    PN-Adult Premix 5/20 - Standard Electrolytes - Central Line      pantoprozole (PROTONIX) infusion 8 mg/hr (01/18/23 9108)    sodium chloride 125 mL/hr at 01/19/23 0521    sodium chloride 5 mL/hr at 01/14/23 2019     Scheduled Medications    thiamine  100 mg IntraVENous Daily    bisacodyl  5 mg Oral Daily    acetaminophen  1,000 mg Oral Once    polyethylene glycol  17 g Oral Daily    sodium chloride flush  5-40 mL IntraVENous 2 times per day    [Held by provider] enoxaparin  40 mg SubCUTAneous Daily     PRN Meds: prochlorperazine, HYDROmorphone, oxyCODONE, sodium chloride flush, sodium chloride, polyethylene glycol, acetaminophen **OR** acetaminophen      Intake/Output Summary (Last 24 hours) at 1/19/2023 0858  Last data filed at 1/19/2023 0600  Gross per 24 hour   Intake 3686 ml   Output 590 ml   Net 3096 ml         Physical Exam Performed:    /75   Pulse (!) 106   Temp (!) 100.6 °F (38.1 °C) (Core)   Resp 19   Ht 5' 3\" (1.6 m)   Wt 213 lb 10 oz (96.9 kg)   SpO2 96%   BMI 37.84 kg/m²     General appearance: No apparent distress, appears stated age and cooperative. HEENT: Pupils equal, round, and reactive to light. Conjunctivae/corneas clear. Neck: Supple, with full range of motion. No jugular venous distention. Trachea midline. Respiratory:  Normal respiratory effort. Clear to auscultation, bilaterally without Rales/Wheezes/Rhonchi.   Cardiovascular: Regular rate and rhythm with normal S1/S2 without murmurs, rubs or gallops. Abdomen: Soft, non-tender, firm, distended with decreased bowel sounds. Musculoskeletal: No clubbing, cyanosis or edema bilaterally. Full range of motion without deformity. Skin: Skin color, texture, turgor normal.  No rashes or lesions. Neurologic:  Neurovascularly intact without any focal sensory/motor deficits. Cranial nerves: II-XII intact, grossly non-focal.  Psychiatric: Alert and oriented, thought content appropriate, normal insight  Capillary Refill: Brisk, 3 seconds, normal   Peripheral Pulses: +2 palpable, equal bilaterally       Labs:   Recent Labs     01/18/23  0336 01/18/23  2313 01/19/23  0204 01/19/23  0350   WBC 19.7*  --  25.4* 22.9*   HGB 11.1* 10.6* 10.2* 9.9*   HCT 38.2  --  35.7* 35.4*   *  --  452* 448       Recent Labs     01/17/23  0401 01/18/23  0336 01/18/23  0958 01/19/23  0350    144  --  144   K 4.3 4.6  --  6.1*   CL 99 108  --  114*   CO2 27 24  --  17*   BUN 44* 45*  --  62*   CREATININE 1.4* 1.4*  --  2.2*   CALCIUM 8.7 8.4  --  7.3*   PHOS  --   --  3.6 4.5       Recent Labs     01/17/23  1042   AST 15   ALT 9*   BILIDIR <0.2   BILITOT <0.2   ALKPHOS 111       Recent Labs     01/17/23  1226   INR 1.20*       No results for input(s): CKTOTAL, TROPONINI in the last 72 hours. Urinalysis:      Lab Results   Component Value Date/Time    NITRU Negative 01/14/2023 01:55 PM    WBCUA 0-2 01/14/2023 01:55 PM    BACTERIA 1+ 01/14/2023 01:55 PM    RBCUA 11-20 01/14/2023 01:55 PM    BLOODU LARGE 01/14/2023 01:55 PM    SPECGRAV >=1.030 01/14/2023 01:55 PM    GLUCOSEU Negative 01/14/2023 01:55 PM       Radiology:  XR ABDOMEN (KUB) (SINGLE AP VIEW)   Final Result   1. Persistent small bowel distention compatible with partial small bowel obstruction. XR CHEST PORTABLE   Final Result      Increased pulmonary edema and increased moderate right greater than left pleural effusions.       XR ABDOMEN (KUB) (SINGLE AP VIEW) Final Result   Impression:   Suspected small bowel obstruction, similar in appearance compared to prior. US GUIDED PARACENTESIS   Final Result   1. Successful ultrasound-guided paracentesis without complication. CT ABDOMEN PELVIS WO CONTRAST Additional Contrast? Oral   Final Result   1. Diffuse small bowel distention essentially to the ileocecal valve most indicative of ileus. 2. Enlarged pelvic mass again noted. 3. Small to moderate volume ascites. Moderate bilateral pleural effusions. XR CHEST PORTABLE   Final Result   1. Cardiomegaly with probable effusions and basilar airspace disease suggesting pulmonary edema and atelectasis. Superimposed pneumonia is not excluded. XR ABDOMEN (KUB) (SINGLE AP VIEW)   Final Result   1. Nonspecific bowel gas pattern suggestive for mild degree of ileus   2. At least moderate ostial arthritis right hip      PET INTERPRETATION OF OUTSIDE IMAGES    (Results Pending)   CTA ABDOMEN PELVIS W CONTRAST    (Results Pending)       IP CONSULT TO HOSPITALIST  IP CONSULT TO ONCOLOGY  IP CONSULT TO DIETITIAN  IP CONSULT TO PALLIATIVE CARE  IP CONSULT TO PHARMACY  IP CONSULT TO GI    Assessment/Plan:    Fevers/ possible early sepsis: pt started empirically on levaquin overnight. Per oncology recs to broaden coverage, will start meropenem and flagyl. Intractable nausea and vomiting, abdominal pain, due to ovarian malignancy with peritoneal carcinomatosis with ascites,mild small bowel ileus   Continue IV fluids, antiemetics, clear liquid diet. .    CT abdomen/pelvis 1/17 reviewed--shows small bowel ileus, small to moderate moderate ascites, moderate pleural effusions  KUB early am shows PSBO. Continue bowel regimen, monitor. S/p Ultrasound-guided thoracentesis -800ml  C/w Pain control     -Metastatic ovarian cancer -large ovarian mass, Peritoneal carcinomatosis with ascites, small pleural effusions likely metastatic ovarian cancer. .    S/p CT-guided biopsy of right iliac lymph node 1/12--  Path positive for poorly differentiated adenocarcinoma. IHC studies suggestive of GYN origin  Discussed with oncology. carboplatin/Taxol started yesterday.          -Acute kidney injury, prerenal--creatinine elevated to 1.4-1.6, 2.2 today. Dr. Brad Bennett has been consulted baseline creatinine      Berger inserted given low urine output for close monitoring    -Moderate bilateral pleural effusions and compressive likely atelectasis due to metastatic ovarian malignancy  Noted similar findings on prior [facility] at [facility]  BNP is not significantly elevated. .  Echo is pending     -chronic abdominal pain/discomfort related to peritoneal carcinomatosis ---continue pain regimen, likely worsened recently due to PSBO/ ileus     -Reactive leukocytosis and thrombocytosis/microcytic anemia related to malignancy-- likely related to malignancy, cont to follow. -Recent UTI/bacteriuria-culture positive for Klebsiella 25 K CFUS recent admission-procalcitonin is elevated-complete course of  levofloxacin 1/18/23.        -Obesity with BMI 34     -Anorexia/poor p.o. intake related to chronic nausea, pain and abdominal malignancy  TPN started yesterday. DVT Prophylaxis: lovenox  Diet: PN-Adult Premix 5/20 - Standard Electrolytes - Central Line  Diet NPO  Code Status: Full Code  PT/OT Eval Status:     Dispo - dispo not clear at this time, pending clinical improvement. Pt is critically ill at this time.          Estee Rose MD

## 2023-01-19 NOTE — PROGRESS NOTES
Latest Reference Range & Units 1/19/23 03:50   Creatinine 0.6 - 1.2 mg/dL 2.2 (H)   Spoke to resident, Dr. Nunu Boyd, re; lab value.  Stated to consult nephrology and hold off on CT w/ contrast and to speak with them about potential CT w/o contrast.

## 2023-01-19 NOTE — CONSULTS
General Surgery   Resident Consult Note    Reason for Consult: SBO    History of Present Illness:   Zee Mayorga is a 57 y.o. female with Hx of newly diagnosed ovarian neoplasm with peritoneal, omental metastases (12/29) and ascites who presented to the emergency department on 1/14 with complaints of intractable nausea and vomiting. Patient had been relatively stable during her current admission until last night when she spiked a fever up to 104 F with an episode of hematemesis for which GI was consulted. KUB was also ordered which showed a small bowel obstruction unchanged from 1/14. Patient had recently been admitted 1/10-1/12 with complaints of abdominal pain following an outpatient paracentesis. She underwent a CT-guided biopsy of the ovarian mass on 1/12 which showed poorly differentiated adenocarcinoma. She was discharged home with improvement of her abdominal pain/discomfort. She has been having persistent nausea, vomiting and anorexia since she was discharged. Upon interview, patient reports that she has not had a bowel movement in over a week, but does currently endorse in passing gas. She states that she feels nauseous but has not vomited since last night. She denies any use of blood thinners. She has not had any surgical history. Past Medical History:        Diagnosis Date    Ascites     Ovarian neoplasm        Past Surgical History:        Procedure Laterality Date    CT BIOPSY LYMPH NODES SUPERFICIAL  1/12/2023    CT BIOPSY LYMPH NODES SUPERFICIAL 1/12/2023 [facility] CT SCAN       Allergies:  Azithromycin and Amoxicillin    Medications:   Home Meds  No current facility-administered medications on file prior to encounter. Current Outpatient Medications on File Prior to Encounter   Medication Sig Dispense Refill    oxyCODONE-acetaminophen (PERCOCET) 5-325 MG per tablet Take 1 tablet by mouth every 6 hours as needed for Pain for up to 7 days. Intended supply: 3 days.  Take lowest dose possible to manage pain Max Daily Amount: 4 tablets 28 tablet 0    docusate sodium (COLACE) 100 MG capsule Take 1 capsule by mouth 2 times daily as needed for Constipation 60 capsule 0    ondansetron (ZOFRAN-ODT) 4 MG disintegrating tablet Take 1 tablet by mouth 3 times daily as needed for Nausea or Vomiting 21 tablet 0    levoFLOXacin (LEVAQUIN) 500 MG tablet Take 1 tablet by mouth daily for 7 days 7 tablet 0       Current Meds  [START ON 1/20/2023] filgrastim-aafi (NIVESTYM) injection 480 mcg, Once  glucose chewable tablet 16 g, PRN  dextrose bolus 10% 125 mL, PRN   Or  dextrose bolus 10% 250 mL, PRN  glucagon injection 1 mg, PRN  dextrose 10 % infusion, Continuous PRN  lactated ringers infusion, Continuous  sodium bicarbonate 75 mEq in sodium chloride 0.45 % 1,000 mL infusion, Continuous  thiamine (B-1) injection 100 mg, Daily  bisacodyl (DULCOLAX) EC tablet 5 mg, Daily  prochlorperazine (COMPAZINE) injection 10 mg, Q6H PRN  acetaminophen (TYLENOL) tablet 1,000 mg, Once  pantoprazole (PROTONIX) 80 mg in sodium chloride 0.9 % 100 mL infusion, Continuous  HYDROmorphone (DILAUDID) injection 1 mg, Q3H PRN  polyethylene glycol (GLYCOLAX) packet 17 g, Daily  oxyCODONE (ROXICODONE) immediate release tablet 5 mg, Q4H PRN  sodium chloride flush 0.9 % injection 5-40 mL, 2 times per day  sodium chloride flush 0.9 % injection 5-40 mL, PRN  0.9 % sodium chloride infusion, PRN  [Held by provider] enoxaparin (LOVENOX) injection 40 mg, Daily  polyethylene glycol (GLYCOLAX) packet 17 g, Daily PRN  acetaminophen (TYLENOL) tablet 650 mg, Q6H PRN   Or  acetaminophen (TYLENOL) suppository 650 mg, Q6H PRN        Family History:   Family History   Adopted: Yes   Family history unknown: Yes       Social History:   TOBACCO:   reports that she has never smoked. She has never used smokeless tobacco.  ETOH:   reports current alcohol use. DRUGS:   reports no history of drug use.     Review of Systems:   A 14 point review of systems was conducted, significant findings as noted in HPI. All other systems negative. Physical exam:    Vitals:    01/19/23 0800 01/19/23 0900 01/19/23 1000 01/19/23 1100   BP: 102/75 94/60 96/61 108/68   Pulse: (!) 106 (!) 103 100 (!) 106   Resp: 19 20 18 19   Temp: (!) 100.6 °F (38.1 °C)      TempSrc: Core      SpO2: 96% 94% 97% 97%   Weight: 213 lb 10 oz (96.9 kg)      Height:           General appearance: Alert, no acute distress, grooming appropriate  Eyes: No scleral icterus, EOM grossly intact  Neck: Trachea midline, no JVD, no lymphadenopathy, neck supple  Chest/Lungs: Normal effort with no accessory muscle use on 5L NC  Cardiovascular: Tachycardic, well-perfused  Abdomen: Soft, mildly distended, obese, diffusely tender, no rebound, guarding, or rigidity; non-peritoneal  Skin: Warm and dry, no rashes  Extremities: No edema, no cyanosis  Genitourinary: Berger in place with clear yellow urine  Neuro: A&Ox3, no focal deficits, sensation intact    Labs:    CBC:   Recent Labs     01/18/23  0336 01/18/23  2313 01/19/23  0204 01/19/23  0350   WBC 19.7*  --  25.4* 22.9*   HGB 11.1* 10.6* 10.2* 9.9*   HCT 38.2  --  35.7* 35.4*   MCV 73.5*  --  76.3* 76.7*   *  --  452* 448     BMP:   Recent Labs     01/17/23  0401 01/18/23  0336 01/18/23  0958 01/19/23  0350    144  --  144   K 4.3 4.6  --  6.1*   CL 99 108  --  114*   CO2 27 24  --  17*   PHOS  --   --  3.6 4.5   BUN 44* 45*  --  62*   CREATININE 1.4* 1.4*  --  2.2*     PT/INR:   Recent Labs     01/17/23  1226   PROTIME 15.2*   INR 1.20*     APTT: No results for input(s): APTT in the last 72 hours.   Liver Profile:   Lab Results   Component Value Date/Time    AST 15 01/17/2023 10:42 AM    ALT 9 01/17/2023 10:42 AM    BILIDIR <0.2 01/17/2023 10:42 AM    BILITOT <0.2 01/17/2023 10:42 AM    ALKPHOS 111 01/17/2023 10:42 AM   No results found for: CHOL, HDL, TRIG  UA:   Lab Results   Component Value Date/Time    COLORU Yellow 01/14/2023 01:55 PM    PHUR 5.5 01/14/2023 01:55 PM    WBCUA 0-2 01/14/2023 01:55 PM    RBCUA 11-20 01/14/2023 01:55 PM    MUCUS Rare 01/12/2023 03:28 PM    BACTERIA 1+ 01/14/2023 01:55 PM    CLARITYU Clear 01/14/2023 01:55 PM    SPECGRAV >=1.030 01/14/2023 01:55 PM    LEUKOCYTESUR TRACE 01/14/2023 01:55 PM    UROBILINOGEN 0.2 01/14/2023 01:55 PM    BILIRUBINUR MODERATE 01/14/2023 01:55 PM    BLOODU LARGE 01/14/2023 01:55 PM    GLUCOSEU Negative 01/14/2023 01:55 PM       Imaging:   XR ABDOMEN (KUB) (SINGLE AP VIEW)   Final Result   1. Persistent small bowel distention compatible with partial small bowel obstruction. XR CHEST PORTABLE   Final Result      Increased pulmonary edema and increased moderate right greater than left pleural effusions. XR ABDOMEN (KUB) (SINGLE AP VIEW)   Final Result   Impression:   Suspected small bowel obstruction, similar in appearance compared to prior. US GUIDED PARACENTESIS   Final Result   1. Successful ultrasound-guided paracentesis without complication. CT ABDOMEN PELVIS WO CONTRAST Additional Contrast? Oral   Final Result   1. Diffuse small bowel distention essentially to the ileocecal valve most indicative of ileus. 2. Enlarged pelvic mass again noted. 3. Small to moderate volume ascites. Moderate bilateral pleural effusions. XR CHEST PORTABLE   Final Result   1. Cardiomegaly with probable effusions and basilar airspace disease suggesting pulmonary edema and atelectasis. Superimposed pneumonia is not excluded. XR ABDOMEN (KUB) (SINGLE AP VIEW)   Final Result   1. Nonspecific bowel gas pattern suggestive for mild degree of ileus   2. At least moderate ostial arthritis right hip      PET INTERPRETATION OF OUTSIDE IMAGES    (Results Pending)   XR CHEST PORTABLE    (Results Pending)     Assessment/Plan:   This is a 57 y.o. female with Hx of newly diagnosed ovarian neoplasm with peritoneal, omental metastases (12/29) and ascites who presented to the emergency department on 1/14 with complaints of intractable nausea and vomiting. Imaging from 01/16 showing diffuse small bowel distention to ileocecal valve most indicative of ileus with decompressed colon. KUB today shows small bowel obstruction unchanged from 01/14. Patient is hemodynamically stable but did have an episode of hematemesis and a fever up to 104 F overnight.  Therefore, general surgery was consulted for evaluation.    - Emergent surgical intervention is not indicated  - Continue NPO, IVF  - NGT placement if patient has more episodes of vomiting  - Follow-up EGD per GI  - Possible gastrograffin challenge tomorrow pending EGD results  - Rest of care per primary team      [employer], DO  01/19/23  11:50 AM

## 2023-01-19 NOTE — PROGRESS NOTES
Hooked pt up to core temp on mae and it read 103.6 degrees. Gave Tylenol suppository for fever and notified both Dr Angel Steven and Dr Robert Dave about fever and results of chest x-ray and KUB. Dr Robert Dave wants to monitor and do repeat KUB at 4am. Ice pack placed on pt x5 to help with fever. Not starting TPN tonight per Dr Angel Steven.

## 2023-01-19 NOTE — PROGRESS NOTES
Pt has been tachy 120-140 since day shift, has had increased RR of 25-35 on 2.5L or O2 96%, pt states she hasnt had a bowel movement in over a week, no bowel sounds heard on exam-second RN verified-, pts temp 101.4 axillary. Dr Danyelle Rivera notified to all. 2 sets of blood cultures ordered,and tylenol given. When pt was taking tylenol, she started having hematemesis dark in color. Md notified again, C resident Dr Amira Tee came bedside-KUB and xray ordered along with labs. Protonix drip ordered and hung.

## 2023-01-19 NOTE — PROGRESS NOTES
ONCOLOGY HEMATOLOGY CARE PROGRESS NOTE      SUBJECTIVE:    Had an episode of hematemesis. Developed an increased 02 requirement overnight and needed 4.5 lpm via NC. Spiked fevers overnight to 104. She was pan-cultured. Patient's friend and POA at bedside today with many questions. ROS:     Constitutional:  +fever, No chills, No night sweats.     Eyes:  No impairment or change in vision  ENT / Mouth:  No pain, abnormal ulceration, bleeding, nasal drip or change in voice or hearing  Cardiovascular:  No chest pain, palpitations, new edema, or calf discomfort  Respiratory:  No pain, hemoptysis, +change to breathing  Gastrointestinal:  No pain, cramping, jaundice, +change to eating and bowel habits  Urinary:  No pain, bleeding or change in continence  Genitalia: No pain, bleeding or discharge  Musculoskeletal:  No redness, pain, edema or +weakness  Skin:  No pruritus, rash, change to nodules or lesions  Neurologic:  No discomfort, change in mental status, speech, sensory or motor activity  Hematologic: No petechiae, ecchymosis or bleeding  Lymphatic:  No lymphadenopathy or lymphedema    OBJECTIVE        Physical    VITALS:  Patient Vitals for the past 24 hrs:   BP Temp Temp src Pulse Resp SpO2 Weight   01/19/23 0700 105/72 (!) 101.3 °F (38.5 °C) CORE (!) 107 20 96 % --   01/19/23 0600 99/72 (!) 102.2 °F (39 °C) CORE (!) 110 25 97 % --   01/19/23 0500 98/73 (!) 102.9 °F (39.4 °C) CORE (!) 115 27 95 % --   01/19/23 0400 96/72 (!) 103.6 °F (39.8 °C) CORE (!) 118 28 94 % --   01/19/23 0300 102/68 (!) 104 °F (40 °C) CORE (!) 118 25 93 % --   01/19/23 0200 96/67 -- -- (!) 118 24 90 % --   01/19/23 0030 102/68 (!) 103.3 °F (39.6 °C) CORE (!) 119 21 90 % --   01/18/23 2112 103/78 (!) 101.4 °F (38.6 °C) Oral (!) 123 (!) 33 95 % --   01/18/23 2045 101/73 -- Axillary (!) 125 29 94 % --   01/18/23 1844 (!) 132/90 98.7 °F (37.1 °C) Oral (!) 129 28 94 % --   01/18/23 1745 (!) 120/97 -- -- -- -- -- --   01/18/23 1715 (!) 130/90 -- -- -- -- -- --   01/18/23 1649 (!) 140/97 98.1 °F (36.7 °C) Oral (!) 121 20 95 % --   01/18/23 1616 115/80 97.9 °F (36.6 °C) Oral (!) 119 18 96 % --   01/18/23 1515 114/73 99.1 °F (37.3 °C) Oral (!) 122 20 95 % --   01/18/23 1421 -- -- -- -- -- -- 211 lb 3.2 oz (95.8 kg)   01/18/23 1416 -- -- -- -- 16 -- --   01/18/23 1346 -- -- -- -- 18 -- --   01/18/23 1158 -- -- -- -- 16 -- --   01/18/23 1157 -- 97.9 °F (36.6 °C) Oral -- -- -- --   01/18/23 1135 118/76 100.2 °F (37.9 °C) Axillary (!) 124 16 95 % --   01/18/23 1128 -- -- -- -- 16 -- --   01/18/23 1025 -- -- -- -- 16 -- --   01/18/23 0955 -- -- -- -- 16 -- --   01/18/23 0846 123/85 99.7 °F (37.6 °C) Axillary (!) 122 20 94 % --       24HR INTAKE/OUTPUT:    Intake/Output Summary (Last 24 hours) at 1/19/2023 0701  Last data filed at 1/19/2023 0600  Gross per 24 hour   Intake 3686 ml   Output 590 ml   Net 3096 ml       CONSTITUTIONAL: alert, cooperative, ill appearing   EYES: pupils equal, round and reactive to light, sclera clear and conjunctiva normal  NECK: supple, symmetrical, no jugular venous distension and no carotid bruits   HEMATOLOGIC/LYMPHATIC: no cervical, supraclavicular or axillary lymphadenopathy   LUNGS: no increased work of breathing and clear to auscultation   CARDIOVASCULAR: tachycardic,  normal S1 and S2, no murmur noted  ABDOMEN: soft, distended, non-tender, no masses palpated  NEUROLOGIC: awake, alert, oriented to name, place and time. Motor skills grossly intact. SKIN: Pale skin color, texture, turgor and no jaundice.  appears intact   EXTREMITIES: B LE edema     DATA:  CBC:    Recent Labs     01/19/23  0350 01/19/23  0204 01/18/23  2313 01/18/23  0336 01/17/23  0401   WBC 22.9* 25.4*  --  19.7* 19.0*   NEUTROABS 21.7* 23.9*  --  17.2* 15.0*   LYMPHOPCT 2.8 4.0  --  5.5 5.0   RBC 4.61 4.68  --  5.20 4.96   HGB 9.9* 10.2* 10.6* 11.1* 10.7*   HCT 35.4* 35.7*  --  38.2 36.1   MCV 76.7* 76.3*  --  73.5* 72.8* MCH 21.5* 21.8*  --  21.4* 21.7*   MCHC 28.1* 28.6*  --  29.1* 29.8*   RDW 28.8* 29.3*  --  30.4* 30.3*    452*  --  666* 704*       PT/INR:    Recent Labs     01/17/23  1226 01/17/23  1042 01/12/23  0945 01/10/23  2337   PROT  --  5.8*  --  6.6   INR 1.20*  --  1.60*  --      PTT:  No results for input(s): APTT in the last 720 hours. CMP:    Recent Labs     01/19/23  0350 01/18/23  0958 01/18/23  0336 01/17/23  1042 01/17/23  0401 01/16/23  0355 01/14/23  1142 01/10/23  2337     --  144  --  139 140   < > 140   K 6.1*  --  4.6  --  4.3 4.1   < > 4.7   *  --  108  --  99 99   < > 100   CO2 17*  --  24  --  27 28   < > 27   GLUCOSE 122*  --  127*  --  104* 104*   < > 130*   BUN 62*  --  45*  --  44* 55*   < > 31*   CREATININE 2.2*  --  1.4*  --  1.4* 1.3*   < > 1.2   LABGLOM 24*  --  42*  --  42* 46*   < > 50*   CALCIUM 7.3*  --  8.4  --  8.7 8.5   < > 9.2   PROT  --   --   --  5.8*  --   --   --  6.6   LABALBU  --   --   --  2.6*  --   --   --  3.0*   AGRATIO  --   --   --   --   --   --   --  0.8*   BILITOT  --   --   --  <0.2  --   --   --  <0.2   ALKPHOS  --   --   --  111  --   --   --  153*   ALT  --   --   --  9*  --   --   --  6*   AST  --   --   --  15  --   --   --  12*   MG 2.20 2.20  --   --   --   --   --   --     < > = values in this interval not displayed. Lab Results   Component Value Date    CALCIUM 7.3 (L) 01/19/2023    PHOS 4.5 01/19/2023       LDH:No results for input(s): LDH in the last 720 hours. Radiology Review:  XR ABDOMEN (KUB) (SINGLE AP VIEW)  Narrative: History: Abdominal ileus. KUB    COMPARISON: 1/18/2023. FINDINGS: Diffuse small bowel distention compatible with partial small bowel obstruction. No significant change from prior. No free air. No acute osseous abnormality. Impression: 1. Persistent small bowel distention compatible with partial small bowel obstruction.       Problem List  Patient Active Problem List   Diagnosis    Failure to thrive in adult    Abdominal pain    Ascites    Microcytic anemia    Nausea and vomiting       ASSESSMENT AND PLAN:        Peritoneal carcinomatosis, ovarian malignancy.     -Biopsy of the right iliac lymph node returned positive for poorly differentiated adenocarcinoma. IHC studies suggestive of GYN origin, GATA3 focally positive. - initial imaging studies  revealed multiple omental lesions, right pelvic lymphadenopathy. The largest iliac lymph node noted was approximately 3 x 1.8 cm.  - CT scan of the chest was positive for moderate left-sided pleural effusion and small right-sided pleural effusion with no evidence of intrapulmonary or hilar/mediastinal lymphadenopathy. -CT A/P 1/16/23  with small to moderate ascites, ileus, enlarged pelvic mass   -Serum  on 1/6/2023 was elevated to  -1141.2  -Patient will need neoadjuvant chemotherapy--we discussed Carboplatin/Taxol regimen and plan to initiate treatment while inpatient. Side effects reviewed--she is agreeable   - discussed with Pharmacy and orders signed  - Palliative Care following   - Cycle 1 day 2 Carbo/Taxol  - plan to start GCSF tomorrow as we expect a drop in her neutrophil counts in approx 1 week     Fevers/possible early sepsis  Tmax 104 overnight   Started on levaquin  ICU team evaluated overnight   Discussed antibiotics with Primary team --would prefer more broad coverage and recommended change to zosyn & flagyl however patient is allergic       Nausea/vomiting. -CT abdomen/pelvis 1/17 reviewed--shows small bowel ileus, small to moderate moderate ascites, moderate pleural effusions  -KUB shows PSBO.    -Continue IV fluids, antiemetics, clear liquid diet  - TPN started 1/18/23      Abdominal distention.  - improved post paracentesis 1/17/23   - pain management switched to Dilaudid-her symptoms are better controlled    NICO  Cr elevated from 1.4 to 2.2  Nephrology consulted      Microcytic anemia  Iron studies mixed   soluable transferrin pending       Long discussion with her friend Rahul Goins today regarding her disease, prognosis, treatment plan, goals of care, and current status. The patient is a DNR/DNI and she has brought the paperwork in today. ONCOLOGIC DISPOSITION:      EMILI Huff CNP  Please contact through [address] [place] [address]    Attending:    Patient seen and examined; data reviewed; case discussed with SHILPIAparna and agree with her note with my modifcation. Rafa Hudson MD, PhD  Director of [address] [place] [address]  [phone]  [employer]    [employer] Clinical Trial Award Araceli Ellis (Twice!)  \"The best management for any cancer patient is in a clinical trial.\"  ---NCCN

## 2023-01-19 NOTE — PROGRESS NOTES
4 Eyes Admission Assessment     I agree as the admission nurse that 2 RN's have performed a thorough Head to Toe Skin Assessment on the patient. ALL assessment sites listed below have been assessed on admission. Areas assessed by both nurses:   [x]   Head, Face, and Ears   [x]   Shoulders, Back, and Chest  [x]   Arms, Elbows, and Hands   [x]   Coccyx, Sacrum, and Ischium  [x]   Legs, Feet, and Heels        Does the Patient have Skin Breakdown?   No         Jl Prevention initiated:  Yes   Wound Care Orders initiated:  NA      [facility] nurse consulted for Pressure Injury (Stage 3,4, Unstageable, DTI, NWPT, and Complex wounds) or Jl score 18 or lower:  NA      Nurse 1 eSignature: Electronically signed by Rachael Hicks RN on 1/18/23 at 7:03 PM EST    **SHARE this note so that the co-signing nurse is able to place an eSignature**    Nurse 2 eSignature: Electronically signed by Mariya Pablo RN on 1/19/23 at 6:35 AM EST

## 2023-01-19 NOTE — CONSULTS
Nephrology Consult Note                                                                                                                                                                                                                                                                                                                                                               Office : [phone]     Fax :[phone]              Patient's Name: Pratik Aburto  9:11 AM  1/19/2023    Reason for Consult: NICO  Requesting Physician:  Pcp No      Chief Complaint:  Intractable nausea/vomiting     History of Present Ilness:    Pratik Aburto is a 57 y.o. female with prior history of metastatic ovarian carcinoma, ascities who presents with abdominoperitoneal carcinomatosis. She is seen by [facility], Dr. Crys Watkins. She was first admitted 12/29/2022 showed a large cystic mass in the pelvis with abdominoperitoneal carcinomatosis and moderate ascites. CT-guided biopsy of left pelvic lymph node 1/12/2023 with results of poorly differentiated adenocarcinoma. Patient was unable to keep food or liquids down. As a result she was started on TPN. During her hospital course, her kidney function steadily declined. Nephrology was consulted for evaluation. Past Medical History:   Diagnosis Date    Ascites     Ovarian neoplasm        Past Surgical History:   Procedure Laterality Date    CT BIOPSY LYMPH NODES SUPERFICIAL  1/12/2023    CT BIOPSY LYMPH NODES SUPERFICIAL 1/12/2023 [facility] CT SCAN       Family History   Adopted: Yes   Family history unknown: Yes        reports that she has never smoked. She has never used smokeless tobacco. She reports current alcohol use. She reports that she does not use drugs.     Allergies:  Azithromycin and Amoxicillin    Current Medications:    thiamine (B-1) injection 100 mg, Daily  bisacodyl (DULCOLAX) EC tablet 5 mg, Daily  PN-Adult Premix 5/20 - Standard Electrolytes - Central Line, Continuous TPN  prochlorperazine (COMPAZINE) injection 10 mg, Q6H PRN  acetaminophen (TYLENOL) tablet 1,000 mg, Once  pantoprazole (PROTONIX) 80 mg in sodium chloride 0.9 % 100 mL infusion, Continuous  HYDROmorphone (DILAUDID) injection 1 mg, Q3H PRN  polyethylene glycol (GLYCOLAX) packet 17 g, Daily  oxyCODONE (ROXICODONE) immediate release tablet 5 mg, Q4H PRN  0.9 % sodium chloride infusion, Continuous  sodium chloride flush 0.9 % injection 5-40 mL, 2 times per day  sodium chloride flush 0.9 % injection 5-40 mL, PRN  0.9 % sodium chloride infusion, PRN  [Held by provider] enoxaparin (LOVENOX) injection 40 mg, Daily  polyethylene glycol (GLYCOLAX) packet 17 g, Daily PRN  acetaminophen (TYLENOL) tablet 650 mg, Q6H PRN   Or  acetaminophen (TYLENOL) suppository 650 mg, Q6H PRN        Review of Systems:   14 point ROS obtained but were negative except mentioned in HPI      Physical exam:     Vitals:  /75   Pulse (!) 106   Temp (!) 100.6 °F (38.1 °C) (Core)   Resp 19   Ht 5' 3\" (1.6 m)   Wt 213 lb 10 oz (96.9 kg)   SpO2 96%   BMI 37.84 kg/m²   Constitutional:  OAA X3 NAD  Skin: no rash, turgor wnl  Heent:  eomi, mmm  Neck: no bruits or jvd noted  Cardiovascular:  S1, S2 without m/r/g  Respiratory: CTA B without w/r/r  Abdomen:  +bs, soft, nt, nd  Ext: 1+ bilateral lower extremity edema  Psychiatric: mood and affect appropriate  Musculoskeletal:  Rom, muscular strength intact    Data:   Labs:  CBC:   Recent Labs     01/18/23  0336 01/18/23  2313 01/19/23  0204 01/19/23  0350   WBC 19.7*  --  25.4* 22.9*   HGB 11.1* 10.6* 10.2* 9.9*   *  --  452* 448     BMP:    Recent Labs     01/17/23  0401 01/18/23  0336 01/19/23  0350    144 144   K 4.3 4.6 6.1*   CL 99 108 114*   CO2 27 24 17*   BUN 44* 45* 62*   CREATININE 1.4* 1.4* 2.2*   GLUCOSE 104* 127* 122*     Ca/Mg/Phos:   Recent Labs     01/17/23  0401 01/18/23  0336 01/18/23  0958 01/19/23  0350   CALCIUM 8.7 8.4  --  7.3*   MG  --   --  2.20 2.20   PHOS  -- --  3.6 4.5     Hepatic:   Recent Labs     01/17/23  1042   AST 15   ALT 9*   BILITOT <0.2   ALKPHOS 111     Troponin: No results for input(s): TROPONINI in the last 72 hours. BNP: No results for input(s): BNP in the last 72 hours. Lipids: No results for input(s): CHOL, TRIG, HDL, LDLCALC, LABVLDL in the last 72 hours. ABGs: No results for input(s): PHART, PO2ART, WNL1VES in the last 72 hours. INR:   Recent Labs     01/17/23  1226   INR 1.20*     UA:No results for input(s): [place] Domin[place], GLUCOSEU, BILIRUBINUR, KETUA, SPECGRAV, BLOODU, PHUR, PROTEINU, UROBILINOGEN, NITRU, LEUKOCYTESUR, Neelima Pier in the last 72 hours. Urine Microscopic: No results for input(s): LABCAST, BACTERIA, COMU, HYALCAST, WBCUA, RBCUA, EPIU in the last 72 hours. Urine Culture: No results for input(s): LABURIN in the last 72 hours. Urine Chemistry: No results for input(s): [place], PROTEINUR, NAUR in the last 72 hours. IMAGING:  XR ABDOMEN (KUB) (SINGLE AP VIEW)   Final Result   1. Persistent small bowel distention compatible with partial small bowel obstruction. XR CHEST PORTABLE   Final Result      Increased pulmonary edema and increased moderate right greater than left pleural effusions. XR ABDOMEN (KUB) (SINGLE AP VIEW)   Final Result   Impression:   Suspected small bowel obstruction, similar in appearance compared to prior. US GUIDED PARACENTESIS   Final Result   1. Successful ultrasound-guided paracentesis without complication. CT ABDOMEN PELVIS WO CONTRAST Additional Contrast? Oral   Final Result   1. Diffuse small bowel distention essentially to the ileocecal valve most indicative of ileus. 2. Enlarged pelvic mass again noted. 3. Small to moderate volume ascites. Moderate bilateral pleural effusions. XR CHEST PORTABLE   Final Result   1. Cardiomegaly with probable effusions and basilar airspace disease suggesting pulmonary edema and atelectasis.  Superimposed pneumonia is not excluded. XR ABDOMEN (KUB) (SINGLE AP VIEW)   Final Result   1. Nonspecific bowel gas pattern suggestive for mild degree of ileus   2. At least moderate ostial arthritis right hip      PET INTERPRETATION OF OUTSIDE IMAGES    (Results Pending)   CTA ABDOMEN PELVIS W CONTRAST    (Results Pending)       Assessment/Plan   1. NICO 2/2 Prerenal     2. HTN    3. Anemia    4. Acid- base/ Electrolyte imbalance     5. Abdominoperitoneal carcinomatosis    6. Metastatic Ovarian Cancer     7. Fever/Early Sepsis? - Baseline creatinine ~0.6-0.7  - NICO likely from volume depletion in setting of nausea/vomiting  - Stop NS and start 1/2 NS + Bicarb  - Monitor RFP & UOP  - Continue Berger   - Strict I&Os  - Avoid nephrotoxins, NSAIDs & agents  - Sepsis work up  - Abx per primary  - Check LDH for concerns of TLS  - Cycle #1 D#2 Carbo/Taxol per oncology  - Hold off TPN due to hyperkalemia  - Recheck K   - Will need CRRT if K worsens      Thank you for allowing us to participate in care of Felicita Garcia MD    Discussed with attending, Dr. Mark Villaseñor of   [facility] [address]  Office : [phone]  Fax :[phone]      I have seen the patient independently from the resident . I discussed the care with the resident. I personally reviewed the HPI, PH, FH, SH, ROS and medications. I repeated pertinent portions of the examination and reviewed the relevant imaging and laboratory data.  I agree with the findings, assessment and plan as documented, with the following addendum:      Mark Villaseñor MD

## 2023-01-19 NOTE — PLAN OF CARE
Problem: Gastrointestinal - Adult  Goal: Minimal or absence of nausea and vomiting  1/19/2023 0611 by Andra Velasquez RN  Note: Pt vomited blood. KUB and CXR completed. NPO; hold off on TPN. CT of abdomen on hold until creat. Level comes back     Problem: Safety - Adult  Goal: Free from fall injury  Note: Pt remained free from fall during shift. Safety measures in place. Problem: Nutrition Deficit:  Goal: Optimize nutritional status  1/19/2023 0611 by Andra Velasquez RN  Note: NPO, holding off on TPN. Problem: Pain  Goal: Verbalizes/displays adequate comfort level or baseline comfort level  1/18/2023 1955 by Isabella Bhakta RN  Outcome: Not Progressing     Problem: Nutrition Deficit:  Goal: Optimize nutritional status  1/19/2023 0611 by Andra Velasquez RN  Note: NPO, holding off on TPN.   1/18/2023 1955 by Isabella Bhakta RN  Outcome: Not Progressing

## 2023-01-19 NOTE — CONSULTS
Pulmonology Consult Note  PGY-3    PCP: Pcp No    Code:Full Code  Admit Date: 1/14/2023  Diet: PN-Adult Premix 5/20 - Standard Electrolytes - Central Line  Diet NPO      History of present illness:      CC: Intractable nausea and vomiting    Patient is a 57 y.o. female with a PMHx of ovarian neoplasm with peritoneal, omental metastasis with ascites presenting with chief complaint of intractable nausea and vomiting. Patient was recently admitted to the hospital from January 10 to January 12 with abdominal pain following an outpatient paracentesis. She underwent CT-guided biopsy of ovarian mass on January 12 and was discharged home. She had an abnormal UA and was given levofloxacin empirically at discharge. At that time, urine culture grew Klebsiella 25K CFUS. Patient has had chronic abdominal pain with nausea. Since the discharge, she continues to have persistent nausea and vomiting and not able to eat much or take her medications. She has had bowel movement, no diarrhea, fever, chills. She had an outpatient CT/PET skin the day before her presentation to the facility. In the ED, blood pressure was 141/97, heart rate 120, respiratory rate 18, temperature 97.5 °F, oxygen saturation 99% on room air. KUB showed evidence of mild ileus. ROS: Review of Systems -   All other systems reviewed and are negative. Past Medical / Surgical History:    Past Medical History:   Diagnosis Date    Ascites     Ovarian neoplasm      Past Surgical History:   Procedure Laterality Date    CT BIOPSY LYMPH NODES SUPERFICIAL  1/12/2023    CT BIOPSY LYMPH NODES SUPERFICIAL 1/12/2023 [facility] CT SCAN       Medications Prior to Admission:    No current facility-administered medications on file prior to encounter.      Current Outpatient Medications on File Prior to Encounter   Medication Sig Dispense Refill    oxyCODONE-acetaminophen (PERCOCET) 5-325 MG per tablet Take 1 tablet by mouth every 6 hours as needed for Pain for up to 7 days. Intended supply: 3 days. Take lowest dose possible to manage pain Max Daily Amount: 4 tablets 28 tablet 0    docusate sodium (COLACE) 100 MG capsule Take 1 capsule by mouth 2 times daily as needed for Constipation 60 capsule 0    ondansetron (ZOFRAN-ODT) 4 MG disintegrating tablet Take 1 tablet by mouth 3 times daily as needed for Nausea or Vomiting 21 tablet 0    levoFLOXacin (LEVAQUIN) 500 MG tablet Take 1 tablet by mouth daily for 7 days 7 tablet 0       Allergies:  Azithromycin and Amoxicillin    Social History:   TOBACCO:   reports that she has never smoked. She has never used smokeless tobacco.       ETOH:   reports current alcohol use. Patient currently lives     Family History:       Adopted: Yes   Family history unknown: Yes       Vital/I&O/Physical examination:   VS:  /75   Pulse (!) 106   Temp (!) 100.6 °F (38.1 °C) (Core)   Resp 19   Ht 5' 3\" (1.6 m)   Wt 213 lb 10 oz (96.9 kg)   SpO2 96%   BMI 37.84 kg/m²     I/O:    Intake/Output Summary (Last 24 hours) at 1/19/2023 1021  Last data filed at 1/19/2023 0800  Gross per 24 hour   Intake 3686 ml   Output 490 ml   Net 3196 ml       PE:  Physical Exam  Vitals reviewed. HENT:      Nose: Nose normal.      Mouth/Throat:      Mouth: Mucous membranes are moist.   Eyes:      Extraocular Movements: Extraocular movements intact. Conjunctiva/sclera: Conjunctivae normal.      Pupils: Pupils are equal, round, and reactive to light. Cardiovascular:      Rate and Rhythm: Normal rate and regular rhythm. Pulmonary:      Effort: Pulmonary effort is normal.      Comments: On 5 L of nasal cannula. Abdominal:      General: There is distension. Tenderness: There is abdominal tenderness. Musculoskeletal:         General: Normal range of motion. Neurological:      General: No focal deficit present. Mental Status: She is alert and oriented to person, place, and time.          Labs & Imaging:   LABS:  CBC:   Recent Labs 01/18/23  0336 01/18/23  2313 01/19/23  0204 01/19/23  0350   WBC 19.7*  --  25.4* 22.9*   HGB 11.1* 10.6* 10.2* 9.9*   HCT 38.2  --  35.7* 35.4*   *  --  452* 448   MCV 73.5*  --  76.3* 76.7*                            Renal:   Recent Labs     01/17/23  0401 01/18/23  0336 01/19/23  0350    144 144   K 4.3 4.6 6.1*   CL 99 108 114*   CO2 27 24 17*   BUN 44* 45* 62*   CREATININE 1.4* 1.4* 2.2*   GLUCOSE 104* 127* 122*   ANIONGAP 13 12 13     Hepatic:   Recent Labs     01/17/23  1042   AST 15   ALT 9*   BILITOT <0.2   ALKPHOS 111     Troponin: No results for input(s): TROPONINI in the last 72 hours. BNP: No results for input(s): BNP in the last 72 hours. Lipids: No results for input(s): CHOL, HDL in the last 72 hours. Invalid input(s): LDLCALCU, TRIGLYCERIDE  INR:   Recent Labs     01/17/23  1226   INR 1.20*     Lactate: No results for input(s): LACTATE in the last 72 hours. ABGs:No results for input(s): PHART, THA5RBI, PO2ART, CHC3IWY, BEART, THGBART, L3RCFZUR, MWJ9QCJ in the last 72 hours. UA:No results for input(s): NITRITE, COLORU, PHUR, LABCAST, WBCUA, RBCUA, MUCUS, TRICHOMONAS, YEAST, BACTERIA, CLARITYU, SPECGRAV, LEUKOCYTESUR, UROBILINOGEN, BILIRUBINUR, BLOODU, GLUCOSEU, AMORPHOUS in the last 72 hours. Invalid input(s): KETONESU     IMAGING:  XR ABDOMEN (KUB) (SINGLE AP VIEW)   Final Result   1. Persistent small bowel distention compatible with partial small bowel obstruction. XR CHEST PORTABLE   Final Result      Increased pulmonary edema and increased moderate right greater than left pleural effusions. XR ABDOMEN (KUB) (SINGLE AP VIEW)   Final Result   Impression:   Suspected small bowel obstruction, similar in appearance compared to prior. US GUIDED PARACENTESIS   Final Result   1. Successful ultrasound-guided paracentesis without complication. CT ABDOMEN PELVIS WO CONTRAST Additional Contrast? Oral   Final Result   1.   Diffuse small bowel distention essentially to the ileocecal valve most indicative of ileus. 2. Enlarged pelvic mass again noted. 3. Small to moderate volume ascites. Moderate bilateral pleural effusions. XR CHEST PORTABLE   Final Result   1. Cardiomegaly with probable effusions and basilar airspace disease suggesting pulmonary edema and atelectasis. Superimposed pneumonia is not excluded. XR ABDOMEN (KUB) (SINGLE AP VIEW)   Final Result   1. Nonspecific bowel gas pattern suggestive for mild degree of ileus   2. At least moderate ostial arthritis right hip      PET INTERPRETATION OF OUTSIDE IMAGES    (Results Pending)   CTA ABDOMEN PELVIS W CONTRAST    (Results Pending)       Assessment & Plan:    Keren Parker is a 57 y.o. female with PMHx significant for ovarian neoplasm with peritoneal, omental metastasis with ascites presenting with chief complaint of intractable nausea and vomiting. Bilateral pleural effusion:  Hypoxemia:  - Patient presented with nausea and vomiting. She has history of metastatic ovarian cancer. She had outpatient paracentesis. - Currently, she is on 4L of NC.  - Chest X ray was done on admission pulmonary edema and increased moderate right greater than left pleural effusions. Repeat X ray this afternoon showed Improving but persistent bibasilar pleural and parenchymal opacities. No consolidation on chest X ray. - We will continue to monitor for now. Small bowel obstruction:  - Abdominal X Ray showed Persistent small bowel distention compatible with partial small bowel obstruction. - GI and surgery are consulted. NICO:  - Cr worse this morning comparing to yesterday. Patient had decrease urine output.  - Nephrology was consulted. - Aviod contrast and nephrotoxic meds.       Code Status: Full Code  PN-Adult Premix 5/20 - Standard Electrolytes - Central Line  Diet NPO      This patient will be discussed with attending, Dr. Keara Fernando MD.    Starr Byrd MD MD, PGY- 3  Contact via Ricki  1/19/2023,  10:21 AM   Patient examined, findings as discussed with Dr. Ned Suero. Agree with assessment and plan. Her primary acute problem appears to be partial small bowel obstruction. She has some worsening renal failure, and with this nonanion gap metabolic acidosis. She is able to maintain adequate BP. It is not clear that she has sepsis. She is on pre-existing antibiotic therapy. There are no new pulmonary infiltrates. Bibasilar pleural effusions unchanged, no pulmonary edema. Oxygenation is maintained with low flow O2.   Advanced directives have been updated, CODE STATUS now [facility]

## 2023-01-19 NOTE — PLAN OF CARE
Problem: Pain  Goal: Verbalizes/displays adequate comfort level or baseline comfort level  Outcome: Not Progressing- PRN oxycodone and dilaudid given as needed. Patient rested most of day. Will continue to monitor. Problem: Nutrition Deficit:  Goal: Optimize nutritional status  Outcome: Not Progressing- Only drank cranberry juice and water, no other PO intake. TPN to start this evening after chemotherapy     Problem: Gastrointestinal - Adult  Goal: Minimal or absence of nausea and vomiting  Outcome: Progressing- no complaints of nausea vomiting this shift. Will continue to monitor.

## 2023-01-20 PROBLEM — Z71.89 GOALS OF CARE, COUNSELING/DISCUSSION: Status: ACTIVE | Noted: 2023-01-01

## 2023-01-20 PROBLEM — C80.1 CANCER (HCC): Status: ACTIVE | Noted: 2023-01-01

## 2023-01-20 PROBLEM — Z51.5 ENCOUNTER FOR PALLIATIVE CARE: Status: ACTIVE | Noted: 2023-01-01

## 2023-01-20 NOTE — PLAN OF CARE
Problem: Gastrointestinal - Adult  Goal: Maintains or returns to baseline bowel function  1/20/2023 0642 by Dario Gomez, RN  Note: Bowel sounds have not returned, pt not passing gas. Pt remains NPO     Problem: Pain  Goal: Verbalizes/displays adequate comfort level or baseline comfort level  1/20/2023 0642 by Dario Gomez RN  Note: Pt with complaints of abdominal pain during shift. Medicated with dilaudid per [facility] with fair response per pt. Pt denies further needs at this time. Will continue to monitor and implement plan of care. 1/19/2023 2036 by Steve Birmingham  Outcome: Progressing  Flowsheets (Taken 1/17/2023 0256 by Arlette Arzate RN)  Verbalizes/displays adequate comfort level or baseline comfort level:   Encourage patient to monitor pain and request assistance   Assess pain using appropriate pain scale   Administer analgesics based on type and severity of pain and evaluate response   Implement non-pharmacological measures as appropriate and evaluate response  Note: Patient c/o abdominal pain. PRN dilaudid given per MAR. Patient resting with eyes closed on reassessment. Problem: Cardiovascular - Adult  Goal: Maintains optimal cardiac output and hemodynamic stability  Note: Pt's hemodynamically unstable for a period of time.  Levo started; 1LR bolus given

## 2023-01-20 NOTE — FLOWSHEET NOTE
01/20/23 0400   Vitals   Temp 99.3 °F (37.4 °C)   Temp Source CORE   Heart Rate (!) 137   Heart Rate Source Telemetry   Resp 16   BP (!) 71/32   MAP (Calculated) 45   MAP (mmHg) (!) 45   BP Location Left upper arm   BP Upper/Lower Upper   BP Method Automatic   [facility]   Dr Mena Chaparro notified of VS. Pt c/o midstern heartburn. Troponin level drawn and EKG completed; inferior infarct and possible anterior infarct, age undetermined. Awaiting dr to be at bedside. Will continue to monitor. 0430: Pt's MAP continues to stay in the 40s-50s, irregular HR sustaining 140s-150s. Dr. Mena Chaparro notified, other resident responded/ at bedside. Rapid response called d/t pt's pressure continuing to drop and no new orders. Dr. Mena Chaparro and team at bedside, x2 STAT EKGs ordered. 1L LR bolus given; pt mildly responded to bolus. Pt started on Levophed; keep MAP >65.

## 2023-01-20 NOTE — PROGRESS NOTES
Hospitalist Progress Note      PCP: Pcp No    Date of Admission: 1/14/2023      Subjective: Patient is lethargic. This morning she had a rapid response at 4:30 AM for hypotension with a blood pressures in the 60s and a heart rate of 140. She was given 2 g of calcium gluconate. Currently she is on 5 L of oxygen via nasal cannula. She has anasarca. She is afebrile but hypotensive and tachycardiac and currently on Levophed. Had a discussion with oncology and palliative care and her POA who is a friend they have decided with DNR status. Medications:  Reviewed    Infusion Medications    norepinephrine 9 mcg/min (01/20/23 1006)    dextrose      IV infusion builder 125 mL/hr at 01/20/23 0654    sodium chloride 5 mL/hr at 01/14/23 2019     Scheduled Medications    calcium carbonate  500 mg Oral Once    metroNIDAZOLE  500 mg IntraVENous Q8H    meropenem  1,000 mg IntraVENous Q12H    pantoprazole  40 mg IntraVENous Daily    thiamine  100 mg IntraVENous Daily    bisacodyl  5 mg Oral Daily    acetaminophen  1,000 mg Oral Once    polyethylene glycol  17 g Oral Daily    sodium chloride flush  5-40 mL IntraVENous 2 times per day    [Held by provider] enoxaparin  40 mg SubCUTAneous Daily     PRN Meds: glucose, dextrose bolus **OR** dextrose bolus, glucagon (rDNA), dextrose, prochlorperazine, HYDROmorphone, oxyCODONE, sodium chloride flush, sodium chloride, polyethylene glycol, acetaminophen **OR** acetaminophen      Intake/Output Summary (Last 24 hours) at 1/20/2023 1351  Last data filed at 1/20/2023 1000  Gross per 24 hour   Intake 2428.69 ml   Output 680 ml   Net 1748.69 ml       Physical Exam Performed:    BP (!) 70/39   Pulse (!) 152   Temp 98 °F (36.7 °C) (Axillary)   Resp (!) 32   Ht 5' 3\" (1.6 m)   Wt 233 lb 11 oz (106 kg)   SpO2 96%   BMI 41.40 kg/m²     General appearance: Sick looking, lethargic  HEENT: Pupils equal, round, and reactive to light. Conjunctivae/corneas clear.   Neck: Distended right neck when  Respiratory: Tachypneic with decreased air entry at the bases  Cardiovascular: Tachycardiac  Abdomen: Soft, non-tender, non-distended with normal bowel sounds. Musculoskeletal: Moves all 4 extremities  Skin: Pale looking. Neurologic: Opens eyes to verbal command but lethargic      Labs:   Recent Labs     01/18/23  0336 01/18/23  2313 01/19/23  0204 01/19/23  0350   WBC 19.7*  --  25.4* 22.9*   HGB 11.1* 10.6* 10.2* 9.9*   HCT 38.2  --  35.7* 35.4*   *  --  452* 448     Recent Labs     01/19/23  0350 01/19/23  1105 01/20/23  0347    144 145   K 6.1* 4.5 4.8   * 112* 113*   CO2 17* 16* 19*   BUN 62* 73* 81*   CREATININE 2.2* 2.2* 2.1*   CALCIUM 7.3* 6.9* 6.4*   PHOS 4.5 4.0 4.8     No results for input(s): AST, ALT, BILIDIR, BILITOT, ALKPHOS in the last 72 hours. No results for input(s): INR in the last 72 hours. Recent Labs     01/20/23  0347   TROPONINI <0.01       Urinalysis:      Lab Results   Component Value Date/Time    NITRU Negative 01/14/2023 01:55 PM    WBCUA 0-2 01/14/2023 01:55 PM    BACTERIA 1+ 01/14/2023 01:55 PM    RBCUA 11-20 01/14/2023 01:55 PM    BLOODU LARGE 01/14/2023 01:55 PM    SPECGRAV >=1.030 01/14/2023 01:55 PM    GLUCOSEU Negative 01/14/2023 01:55 PM       Radiology:  XR CHEST PORTABLE   Final Result   Impression: Improving but persistent bibasilar pleural and parenchymal opacities. XR ABDOMEN (KUB) (SINGLE AP VIEW)   Final Result   1. Persistent small bowel distention compatible with partial small bowel obstruction. XR CHEST PORTABLE   Final Result      Increased pulmonary edema and increased moderate right greater than left pleural effusions. XR ABDOMEN (KUB) (SINGLE AP VIEW)   Final Result   Impression:   Suspected small bowel obstruction, similar in appearance compared to prior. US GUIDED PARACENTESIS   Final Result   1. Successful ultrasound-guided paracentesis without complication.       CT ABDOMEN PELVIS WO CONTRAST Additional Contrast? Oral   Final Result   1. Diffuse small bowel distention essentially to the ileocecal valve most indicative of ileus. 2. Enlarged pelvic mass again noted. 3. Small to moderate volume ascites. Moderate bilateral pleural effusions. XR CHEST PORTABLE   Final Result   1. Cardiomegaly with probable effusions and basilar airspace disease suggesting pulmonary edema and atelectasis. Superimposed pneumonia is not excluded. XR ABDOMEN (KUB) (SINGLE AP VIEW)   Final Result   1. Nonspecific bowel gas pattern suggestive for mild degree of ileus   2. At least moderate ostial arthritis right hip      PET INTERPRETATION OF OUTSIDE IMAGES    (Results Pending)       IP CONSULT TO HOSPITALIST  IP CONSULT TO ONCOLOGY  IP CONSULT TO DIETITIAN  IP CONSULT TO PALLIATIVE CARE  IP CONSULT TO PHARMACY  IP CONSULT TO GI  IP CONSULT TO CRITICAL CARE  IP CONSULT TO GENERAL SURGERY  IP CONSULT TO HOSPICE    Assessment/Plan:    Active Hospital Problems    Diagnosis     Encounter for palliative care [Z51.5]      Priority: Medium    Goals of care, counseling/discussion [Z71.89]      Priority: Medium    Abdominal carcinomatosis ([address] [C76.2]      Priority: Medium    NICO (acute kidney injury) ([address] [N17.9]      Priority: Medium    Hypoxemia [R09.02]      Priority: Medium    Partial small bowel obstruction ([address] [K56.600]      Priority: Medium    Malignant neoplasm of ovary ([address] [C56.9]      Priority: Medium    Hyperkalemia [E87.5]      Priority: Medium    Acidosis [E87.20]      Priority: Medium    Nausea and vomiting [R11.2]      Priority: Medium     1. Possible sepsis-source is unclear. Her urine culture from 1/12/2023 before admission grew Klebsiella pneumonia and was treated with Levaquin that she completed on 1/18/2023. Her chest x-ray did show bilateral atelectasis versus consolidation versus effusion. Blood cultures are negative thus far.   Currently she is on broad-spectrum IV antibiotic with meropenem and Flagyl. Started on Levophed this morning for hypotension. She is also on IV fluid but she is developing edema in the legs likely due to hypoproteinemia. 2.Intractable nausea and vomiting, abdominal pain, due to ovarian malignancy with peritoneal carcinomatosis with ascites,mild small bowel ileus   Continue IV fluids, antiemetics, clear liquid diet. .    CT abdomen/pelvis 1/17 reviewed--shows small bowel ileus, small to moderate moderate ascites, moderate pleural effusions  KUB early am shows PSBO. Continue bowel regimen, monitor. S/p Ultrasound-guided thoracentesis -800ml    3. Acute kidney injury  Nephrology is following appreciate input. 4.Acute hypoxic respiratory failure. Currently on 5 L oxygen. Chest x-ray with bilateral pleural effusions and compressive atelectasis likely due to metastatic ovarian malignancy. Pulmonology are following and appreciate input. 5.Metastatic ovarian cancer -large ovarian mass, Peritoneal carcinomatosis with ascites, small pleural effusions likely metastatic ovarian cancer. .    S/p CT-guided biopsy of right iliac lymph node 1/12--  Path positive for poorly differentiated adenocarcinoma. IHC studies suggestive of GYN origin  Discussed with oncology. carboplatin/Taxol started couple days ago. I think her prognosis is poor. Palliative care are assisting in goals of care discussion and her POA has decided on DNR status. 6.  Anemia-questionable GI bleed. Seen by  GI and plan was to do endoscopy today but she is unstable. DVT Prophylaxis: Lovenox  Diet: Diet NPO  Code Status: DNR-CC      Dispo -prognosis is poor.         Sia Gambino MD

## 2023-01-20 NOTE — FLOWSHEET NOTE
01/20/23 0905   Vitals   Heart Rate (!) 156     ICU resident notified and bedside to evaluate, no new orders at this time, will continue to monitor.

## 2023-01-20 NOTE — SIGNIFICANT EVENT
Rapid response was called at 430 hours due to shock. Pt was found to be tachycardic in the 140s and hypotensive with SBP in the 60s. 1L NS bolus was promptly started and EKG was ordered STAT. Chart was reviewed, labs showed low calcium [corrected ~7.8] with no other electrolyte abnormalities. 2g of calcium gluconate was ordered. EKG showed sinus tach with PACs. Repeat EKG was done to rule out SVT. Pt eventually responded with the HR sustaining in 110s and SBP improvement to 96.

## 2023-01-20 NOTE — PROGRESS NOTES
Occupational Therapy/ Physical Therapy  Hold  Attempted to see pt for OT/PT eval and treat. Per RN pt tachycardic and has planned EGD this afternoon. Requested to hold therapy this am, follow up later this pm as schedule allows/ as medically appropriate. Clarice Sue.  Bro, OTR/L [id number] [facility]

## 2023-01-20 NOTE — PROGRESS NOTES
Overnight events noted. Shock not related to GI bleeding. Will hold off on the planned EGD for now given the clinical situation.     Vicky Wong MD

## 2023-01-20 NOTE — PROGRESS NOTES
Office : [phone]     Fax :[phone]         Renal Progress Note  Subjective:   Admit Date: 1/14/2023        Interval History:   Patient was tachycardic to 120s and hypotensive to low SBP 90s. Started on levophed. Blood pressure controlled this AM.  Decreased UOP. Creatinine slightly improving.       DIET Diet NPO  Medications:   Scheduled Meds:   calcium carbonate  500 mg Oral Once    iron sucrose  300 mg IntraVENous Once    metroNIDAZOLE  500 mg IntraVENous Q8H    meropenem  1,000 mg IntraVENous Q12H    pantoprazole  40 mg IntraVENous Daily    thiamine  100 mg IntraVENous Daily    bisacodyl  5 mg Oral Daily    acetaminophen  1,000 mg Oral Once    polyethylene glycol  17 g Oral Daily    sodium chloride flush  5-40 mL IntraVENous 2 times per day    [Held by provider] enoxaparin  40 mg SubCUTAneous Daily     Continuous Infusions:   norepinephrine 5 mcg/min (01/20/23 0628)    dextrose      IV infusion builder 125 mL/hr at 01/20/23 0654    sodium chloride 5 mL/hr at 01/14/23 2019       Labs:  CBC:   Recent Labs     01/18/23  0336 01/18/23  2313 01/19/23  0204 01/19/23  0350   WBC 19.7*  --  25.4* 22.9*   HGB 11.1* 10.6* 10.2* 9.9*   *  --  452* 448     BMP:    Recent Labs     01/19/23  0350 01/19/23  1105 01/20/23  0347    144 145   K 6.1* 4.5 4.8   * 112* 113*   CO2 17* 16* 19*   BUN 62* 73* 81*   CREATININE 2.2* 2.2* 2.1*   GLUCOSE 122* 157* 93     Ca/Mg/Phos:   Recent Labs     01/18/23  0958 01/19/23  0350 01/19/23  1105 01/20/23  0347   CALCIUM  --  7.3* 6.9* 6.4*   MG 2.20 2.20  --  1.90   PHOS 3.6 4.5 4.0 4.8     Hepatic:   Recent Labs 01/17/23  1042   AST 15   ALT 9*   BILITOT <0.2   ALKPHOS 111     Troponin:   Recent Labs     01/20/23  0347   TROPONINI <0.01     BNP: No results for input(s): BNP in the last 72 hours. Lipids:   Recent Labs     01/19/23  0350   TRIG 294*     ABGs: No results for input(s): PHART, PO2ART, GEW3JAK in the last 72 hours. INR:   Recent Labs     01/17/23  1226   INR 1.20*     UA:No results for input(s): [place], GLUCOSEU, BILIRUBINUR, KETUA, SPECGRAV, BLOODU, PHUR, PROTEINU, UROBILINOGEN, NITRU, LEUKOCYTESUR, Amanda Savers in the last 72 hours. Urine Microscopic: No results for input(s): LABCAST, BACTERIA, COMU, HYALCAST, WBCUA, RBCUA, EPIU in the last 72 hours. Urine Culture: No results for input(s): LABURIN in the last 72 hours. Urine Chemistry: No results for input(s): Alexx Factor, PROTEINUR, NAUR in the last 72 hours. Objective:   Vitals: BP (!) 74/52   Pulse (!) 156   Temp 98.4 °F (36.9 °C) (Oral)   Resp 18   Ht 5' 3\" (1.6 m)   Wt 233 lb 11 oz (106 kg)   SpO2 95%   BMI 41.40 kg/m²    Wt Readings from Last 3 Encounters:   01/20/23 233 lb 11 oz (106 kg)   01/11/23 196 lb (88.9 kg)      24HR INTAKE/OUTPUT:    Intake/Output Summary (Last 24 hours) at 1/20/2023 3906  Last data filed at 1/20/2023 0417  Gross per 24 hour   Intake 4261.69 ml   Output 685 ml   Net 3576.69 ml     Constitutional:  Alert, awake, no apparent distress  NECK: supple, no JVD  Cardiovascular:  S1, S2 without m/r/g  Respiratory:  CTA B without w/r/r  Abdomen: +bs, soft, nt  Ext: 1+ LE edema    Assessment and Plan:       IMAGING:  XR CHEST PORTABLE   Final Result   Impression: Improving but persistent bibasilar pleural and parenchymal opacities. XR ABDOMEN (KUB) (SINGLE AP VIEW)   Final Result   1. Persistent small bowel distention compatible with partial small bowel obstruction. XR CHEST PORTABLE   Final Result      Increased pulmonary edema and increased moderate right greater than left pleural effusions. XR ABDOMEN (KUB) (SINGLE AP VIEW)   Final Result   Impression:   Suspected small bowel obstruction, similar in appearance compared to prior. US GUIDED PARACENTESIS   Final Result   1. Successful ultrasound-guided paracentesis without complication. CT ABDOMEN PELVIS WO CONTRAST Additional Contrast? Oral   Final Result   1. Diffuse small bowel distention essentially to the ileocecal valve most indicative of ileus. 2. Enlarged pelvic mass again noted. 3. Small to moderate volume ascites. Moderate bilateral pleural effusions. XR CHEST PORTABLE   Final Result   1. Cardiomegaly with probable effusions and basilar airspace disease suggesting pulmonary edema and atelectasis. Superimposed pneumonia is not excluded. XR ABDOMEN (KUB) (SINGLE AP VIEW)   Final Result   1. Nonspecific bowel gas pattern suggestive for mild degree of ileus   2. At least moderate ostial arthritis right hip      PET INTERPRETATION OF OUTSIDE IMAGES    (Results Pending)       Assessment/Plan     1. NICO 2/2 Prerenal      2. HTN     3. Anemia     4. Acid- base/ Electrolyte imbalance      5. Abdominoperitoneal carcinomatosis     6. Metastatic Ovarian Cancer      7.  Septic Shock        - Baseline creatinine ~0.6-0.7  - NICO likely from volume depletion in setting of nausea/vomiting  - Continue 1/2 NS + Bicarb  - Monitor RFP & UOP  - Continue Berger   - Strict I&Os  - Avoid nephrotoxins, NSAIDs & agents  - Sepsis work up  - Abx per primary  - Check LDH for concerns of TLS  - Cycle #1 D#3 Carbo/Taxol per oncology  - Hold off TPN due to hyperkalemia  - Will need CRRT if K worsens          Judy Alva MD     Discussed with attending, Dr. Marcellus Shoemaker of   [facility] of [address] [place] [address] -[phone]

## 2023-01-20 NOTE — PROGRESS NOTES
01/20/23 1319   Encounter Summary   Encounter Overview/Reason  Grief, Loss, and Adjustments   Service Provided For: Patient;Friend   Referral/Consult From: Palliative Care   Support System Friends/neighbors   Last Encounter    (es 1/20)   Complexity of Encounter Moderate   Begin Time 1210   End Time  1230   Total Time Calculated 20 min   Assessment/Intervention/Outcome   Assessment Sad   Intervention Active listening;Explored/Affirmed feelings, thoughts, concerns; End of Life Care;Grief Care;Prayer (assurance of)/[place]   Outcome Comfort;Engaged in conversation;Expressed feelings, needs, and concerns;Expressed Gratitude;Receptive  (When pt was told, \"We love you. You are beloved\". She said, \"I have no doubt of that\". )   Plan and Referrals   Plan/Referrals Other (Comment)  (as needed)

## 2023-01-20 NOTE — PROGRESS NOTES
ONCOLOGY HEMATOLOGY CARE PROGRESS NOTE      SUBJECTIVE:    Rapid response called this AM for tachycardia with HR 140s and hypotension with SBP 60s. She is now on Levophed. She remains arousable although she has agonal respirations on exam. She answers questions and states that she is not in any discomfort. No further episodes of vomiting. ROS:     Constitutional:  No fever, No chills, No night sweats.    Cardiovascular:  No chest pain, palpitations, new edema, or calf discomfort +rate change  Respiratory:  No pain, hemoptysis, change to breathing  Gastrointestinal:  No pain, cramping, jaundice, +change to eating and bowel habits  Urinary:  No pain, bleeding or change in continence  Musculoskeletal:  No redness, pain, edema or +weakness  Skin:  No pruritus, rash, change to nodules or lesions  Endocrine:  No hot flashes, increased thirst, or change to urine production  Hematologic: No petechiae, ecchymosis or bleeding  Lymphatic:  No lymphadenopathy or lymphedema    OBJECTIVE        Physical    VITALS:  Patient Vitals for the past 24 hrs:   BP Temp Temp src Pulse Resp SpO2 Weight   01/20/23 0800 (!) 96/58 -- -- (!) 110 16 94 % --   01/20/23 0756 -- -- -- -- -- -- 233 lb 11 oz (106 kg)   01/20/23 0606 96/61 -- -- -- -- -- --   01/20/23 0600 121/71 99.9 °F (37.7 °C) CORE (!) 104 21 96 % --   01/20/23 0500 (!) 90/58 99.5 °F (37.5 °C) CORE (!) 115 23 95 % --   01/20/23 0440 (!) 70/47 -- -- (!) 143 21 96 % --   01/20/23 0435 (!) 71/52 -- -- (!) 142 24 98 % --   01/20/23 0430 (!) 71/40 -- -- (!) 140 21 -- --   01/20/23 0400 (!) 71/32 99.3 °F (37.4 °C) CORE (!) 137 16 95 % --   01/20/23 0200 102/67 99 °F (37.2 °C) CORE (!) 104 19 96 % --   01/20/23 0000 98/71 99 °F (37.2 °C) CORE (!) 106 19 97 % --   01/19/23 2300 (!) 91/54 99 °F (37.2 °C) CORE (!) 112 15 98 % --   01/19/23 2200 91/60 98.6 °F (37 °C) CORE 100 12 97 % --   01/19/23 2100 (!) 91/56 98.4 °F (36.9 °C) CORE 99 12 96 % -- 01/19/23 2004 -- -- -- -- -- 97 % --   01/19/23 2000 102/65 98.2 °F (36.8 °C) CORE 96 22 96 % --   01/19/23 1900 93/64 -- -- 96 15 97 % --   01/19/23 1800 (!) 95/56 -- -- 95 15 96 % --   01/19/23 1700 97/65 -- -- 97 14 96 % --   01/19/23 1600 98/63 98.2 °F (36.8 °C) CORE 96 14 96 % --   01/19/23 1500 100/67 -- -- 96 13 96 % --   01/19/23 1400 98/64 -- -- 95 13 97 % --   01/19/23 1313 -- -- -- -- 18 -- --   01/19/23 1300 103/67 -- -- 96 13 97 % --   01/19/23 1200 112/71 98.8 °F (37.1 °C) CORE 97 19 98 % --   01/19/23 1100 108/68 -- -- (!) 106 19 97 % --   01/19/23 1000 96/61 -- -- 100 18 97 % --   01/19/23 0900 94/60 -- -- (!) 103 20 94 % --       24HR INTAKE/OUTPUT:    Intake/Output Summary (Last 24 hours) at 1/20/2023 0848  Last data filed at 1/20/2023 7754  Gross per 24 hour   Intake 4261.69 ml   Output 685 ml   Net 3576.69 ml       CONSTITUTIONAL: awake to stimuli, answers simple questions, ill appearing   EYES: pupils equal, round and reactive to light, sclera clear and conjunctiva normal  ENT: Normocephalic, without obvious abnormality, atraumatic  NECK: supple, symmetrical, no jugular venous distension and no carotid bruits   HEMATOLOGIC/LYMPHATIC: no cervical, supraclavicular or axillary lymphadenopathy   LUNGS: she is working to breathe   CARDIOVASCULAR:  tachycardic  ABDOMEN:soft, non-distended, non-tender, no masses palpated, no hepatosplenomgaly   SKIN: Pale skin color, texture, turgor and no jaundice.  appears intact   EXTREMITIES: +LE edema     DATA:  CBC:    Recent Labs     01/19/23  0350 01/19/23  0204 01/18/23  2313 01/18/23  0336 01/17/23  0401   WBC 22.9* 25.4*  --  19.7* 19.0*   NEUTROABS 21.7* 23.9*  --  17.2* 15.0*   LYMPHOPCT 2.8 4.0  --  5.5 5.0   RBC 4.61 4.68  --  5.20 4.96   HGB 9.9* 10.2* 10.6* 11.1* 10.7*   HCT 35.4* 35.7*  --  38.2 36.1   MCV 76.7* 76.3*  --  73.5* 72.8*   MCH 21.5* 21.8*  --  21.4* 21.7*   MCHC 28.1* 28.6*  --  29.1* 29.8*   RDW 28.8* 29.3*  --  30.4* 30.3*    452*  --  666* 704*       PT/INR:    Recent Labs     01/17/23  1226 01/17/23  1042 01/12/23  0945 01/10/23  2337   PROT  --  5.8*  --  6.6   INR 1.20*  --  1.60*  --      PTT:  No results for input(s): APTT in the last 720 hours. CMP:    Recent Labs     01/20/23  0347 01/19/23  1105 01/19/23  0350 01/18/23  0958 01/18/23  0336 01/17/23  1042 01/14/23  1142 01/10/23  2337    144 144  --  144  --    < > 140   K 4.8 4.5 6.1*  --  4.6  --    < > 4.7   * 112* 114*  --  108  --    < > 100   CO2 19* 16* 17*  --  24  --    < > 27   GLUCOSE 93 157* 122*  --  127*  --    < > 130*   BUN 81* 73* 62*  --  45*  --    < > 31*   CREATININE 2.1* 2.2* 2.2*  --  1.4*  --    < > 1.2   LABGLOM 26* 24* 24*  --  42*  --    < > 50*   CALCIUM 6.4* 6.9* 7.3*  --  8.4  --    < > 9.2   PROT  --   --   --   --   --  5.8*  --  6.6   LABALBU  --  2.2*  --   --   --  2.6*  --  3.0*   AGRATIO  --   --   --   --   --   --   --  0.8*   BILITOT  --   --   --   --   --  <0.2  --  <0.2   ALKPHOS  --   --   --   --   --  111  --  153*   ALT  --   --   --   --   --  9*  --  6*   AST  --   --   --   --   --  15  --  12*   MG 1.90  --  2.20 2.20  --   --   --   --     < > = values in this interval not displayed. Lab Results   Component Value Date    CALCIUM 6.4 (L) 01/20/2023    PHOS 4.8 01/20/2023       LDH:  Recent Labs     01/19/23  0350   *       Radiology Review:  EGD  No dictation       Problem List  Patient Active Problem List   Diagnosis    Failure to thrive in adult    Abdominal pain    Ascites    Microcytic anemia    Nausea and vomiting    Abdominal carcinomatosis (HCC)    NICO (acute kidney injury) ([facility] [address]    Hypoxemia    Partial small bowel obstruction (HCC)    Malignant neoplasm of ovary (HCC)    Hyperkalemia    Acidosis       ASSESSMENT AND PLAN:     Peritoneal carcinomatosis, ovarian malignancy.     -Biopsy of the right iliac lymph node returned positive for poorly differentiated adenocarcinoma.   IHC studies suggestive of GYN origin, GATA3 focally positive. - initial imaging studies  revealed multiple omental lesions, right pelvic lymphadenopathy. The largest iliac lymph node noted was approximately 3 x 1.8 cm.  - CT scan of the chest was positive for moderate left-sided pleural effusion and small right-sided pleural effusion with no evidence of intrapulmonary or hilar/mediastinal lymphadenopathy. -CT A/P 1/16/23  with small to moderate ascites, ileus, enlarged pelvic mass   -Serum  on 1/6/2023 was elevated to  -1141.2  -Patient will need neoadjuvant chemotherapy--we discussed Carboplatin/Taxol regimen and plan to initiate treatment while inpatient. Side effects reviewed--she is agreeable   - discussed with Pharmacy and orders signed  - Palliative Care following   - Cycle 1 day 3 Carbo/Taxol  - GCSF as we expect a drop in her neutrophil counts in approx 1 week      Fevers  Tmax 104 1/19/23  Started on levaquin  ICU team evaluated   Discussed antibiotics with Primary team --would prefer more broad coverage and recommended change to zosyn & flagyl however patient is allergic   Pulmonology consulted 1/19/23  Rapid response called 1/20/23 for tachycardia and hypotension  She is now requiring pressor support and remains critically ill     NICO:  - Cr worsened  -  Patient had decrease urine output.  - Nephrology was consulted. - Aviod contrast and nephrotoxic meds. Microcytic anemia  Iron studies mixed   soluable transferrin normal  Will give IV Venofer today      Case discussed with patient's EMILI Perez - CNP  Please contact through [address]    Attending:    Patient seen and examined; data reviewed; case discussed with SHILPI, Lawyer Tenorio and agree with her note with my modifcation. Anita Cobb MD, PhD  Director of [address] [place] [address] Nichole Pierce  [phone]  [employer]    [employer] Clinical Trial Award Mary Cotter (Twice!)  \"The best management for any cancer patient is in a clinical trial.\"  ---NCCN

## 2023-01-20 NOTE — PROGRESS NOTES
Office : [phone]     Fax :[phone]         Renal Progress Note  Subjective:   Admit Date: 1/20/2023        Interval History:   Patient was tachycardic to 120s and hypotensive to low SBP 90s. Started on levophed. Blood pressure controlled this AM.  Decreased UOP. Creatinine slightly improving. DIET No diet orders on file  Medications:   Scheduled Meds:   sodium chloride flush  5-40 mL IntraVENous 2 times per day     Continuous Infusions:   sodium chloride         Labs:  CBC:   Recent Labs     01/18/23  0336 01/18/23  2313 01/19/23  0204 01/19/23  0350   WBC 19.7*  --  25.4* 22.9*   HGB 11.1* 10.6* 10.2* 9.9*   *  --  452* 448       BMP:    Recent Labs     01/19/23  0350 01/19/23  1105 01/20/23  0347    144 145   K 6.1* 4.5 4.8   * 112* 113*   CO2 17* 16* 19*   BUN 62* 73* 81*   CREATININE 2.2* 2.2* 2.1*   GLUCOSE 122* 157* 93       Ca/Mg/Phos:   Recent Labs     01/18/23  0958 01/19/23  0350 01/19/23  1105 01/20/23  0347   CALCIUM  --  7.3* 6.9* 6.4*   MG 2.20 2.20  --  1.90   PHOS 3.6 4.5 4.0 4.8       Hepatic:   No results for input(s): AST, ALT, ALB, BILITOT, ALKPHOS in the last 72 hours. Troponin:   Recent Labs     01/20/23  0347   TROPONINI <0.01       BNP: No results for input(s): BNP in the last 72 hours. Lipids:   Recent Labs     01/19/23  0350   TRIG 294*       ABGs: No results for input(s): PHART, PO2ART, MWU2HIZ in the last 72 hours. INR:   No results for input(s): INR in the last 72 hours.     UA:No results for input(s): Dillon Costa, GLUCOSEU, [facility], [address] [facility], [address], BLOODU, [address] [facility], PROTEINU, UROBILINOGEN, [place] Rosalinda Garcia in the last 72 hours. Urine Microscopic: No results for input(s): LABCAST, BACTERIA, COMU, HYALCAST, WBCUA, RBCUA, EPIU in the last 72 hours. Urine Culture: No results for input(s): LABURIN in the last 72 hours. Urine Chemistry: No results for input(s): Sallyanne Ly, PROTEINUR, NAUR in the last 72 hours. Objective:   Vitals: BP (!) 64/37   Pulse (!) 109   Resp 27   SpO2 (!) 87%    Wt Readings from Last 3 Encounters:   01/20/23 233 lb 11 oz (106 kg)   01/11/23 196 lb (88.9 kg)      24HR INTAKE/OUTPUT:  No intake or output data in the 24 hours ending 01/20/23 1844    Constitutional:  Alert, awake, no apparent distress  NECK: supple, no JVD  Cardiovascular:  S1, S2 without m/r/g  Respiratory:  CTA B without w/r/r  Abdomen: +bs, soft, nt  Ext: 1+ LE edema    Assessment and Plan:       IMAGING:  No orders to display       Assessment/Plan     1. NICO 2/2 Prerenal      2. HTN     3. Anemia     4. Acid- base/ Electrolyte imbalance      5. Abdominoperitoneal carcinomatosis     6. Metastatic Ovarian Cancer      7. Septic Shock        - Baseline creatinine ~0.6-0.7  - NICO likely from volume depletion in setting of nausea/vomiting  - Continue 1/2 NS + Bicarb  - Monitor RFP & UOP  - Continue Berger   - Strict I&Os  - Avoid nephrotoxins, NSAIDs & agents  - Sepsis work up  - Abx per primary  - Check LDH for concerns of TLS  - Cycle #1 D#3 Carbo/Taxol per oncology  - Hold off TPN due to hyperkalemia          Sancho Anthony MD     Discussed with attending, Dr. Sancho Anthony of   [facility] of [address] [place], [place] [id number] 18 07    I have seen the patient independently from the resident . I discussed the care with the resident. I personally reviewed the HPI, PH, FH, SH, ROS and medications. I repeated pertinent portions of the examination and reviewed the relevant imaging and laboratory data.  I agree with the findings, assessment and plan as documented, with the following addendum:      Becca Terrell MD

## 2023-01-20 NOTE — H&P
Hospital Medicine History & Physical      PCP: Pcp No    Date of Admission: 1/20/2023    Date of Service: Patient seen/examined on 1/20/2023 and admitted to inpatient hospice for comfort care. Chief Complaint:  Comfort care       History Of Present Illness:    57 y.o. female who presented to [facility] with metastatic ovarian cancer with peritoneal carcinomatosis,chronic intractable nausea,vomiting and abdominal pain due to peritoneal carcinomatosis  was admitted to the hospital with sepsis ,acute hypoxic respiratory ,NICO. and possible Gi bleed Her condition gradually deteriorated and became hypotensive requiring pressors, IVF . She became more lethargic . Palliative care had a long discussion with patient,ryan Cortes who is a close friend and decided on comfort care . She was discharged to hospice for inpatient care. Past Medical History:          Diagnosis Date    Ascites     Ovarian neoplasm        Past Surgical History:          Procedure Laterality Date    CT BIOPSY LYMPH NODES SUPERFICIAL  1/12/2023    CT BIOPSY LYMPH NODES SUPERFICIAL 1/12/2023 [facility] CT SCAN       Medications Prior to Admission:      Prior to Admission medications    Medication Sig Start Date End Date Taking? Authorizing Provider   docusate sodium (COLACE) 100 MG capsule Take 1 capsule by mouth 2 times daily as needed for Constipation 1/12/23   Duy Valera MD   ondansetron (ZOFRAN-ODT) 4 MG disintegrating tablet Take 1 tablet by mouth 3 times daily as needed for Nausea or Vomiting 1/12/23   Duy Valera MD       Allergies:  Azithromycin and Amoxicillin      TOBACCO:   reports that she has never smoked. She has never used smokeless tobacco.  ETOH:   reports current alcohol use. Family History:    Reviewed in detail and negative for DM, CAD, Cancer, CVA. Positive as follows: Adopted: Yes   Family history unknown: Yes       REVIEW OF SYSTEMS:   Pertinent positives as noted in the HPI.  All other systems reviewed and negative. PHYSICAL EXAM PERFORMED:    BP (!) 64/37   Pulse (!) 109   Resp 27   SpO2 (!) 87%     General appearance:  lethargic ,on 5 L oxygen   HEENT:  pale looking. Neck: JVD +,right IJ   Respiratory:  Tachypnea with decreased air entry at the bases. Cardiovascular:  Tachycardiac   Abdomen: Distended with ascites   Musculoskeletal:  Anasarca   Skin: Skin color, texture, turgor normal.  No rashes or lesions. Neurologic:  Open eyes but do not talk       Labs:     Recent Labs     01/18/23  0336 01/18/23  2313 01/19/23  0204 01/19/23  0350   WBC 19.7*  --  25.4* 22.9*   HGB 11.1* 10.6* 10.2* 9.9*   HCT 38.2  --  35.7* 35.4*   *  --  452* 448     Recent Labs     01/19/23  0350 01/19/23  1105 01/20/23  0347    144 145   K 6.1* 4.5 4.8   * 112* 113*   CO2 17* 16* 19*   BUN 62* 73* 81*   CREATININE 2.2* 2.2* 2.1*   CALCIUM 7.3* 6.9* 6.4*   PHOS 4.5 4.0 4.8     No results for input(s): AST, ALT, BILIDIR, BILITOT, ALKPHOS in the last 72 hours. No results for input(s): INR in the last 72 hours.   Recent Labs     01/20/23  0347   TROPONINI <0.01       Urinalysis:      Lab Results   Component Value Date/Time    NITRU Negative 01/14/2023 01:55 PM    WBCUA 0-2 01/14/2023 01:55 PM    BACTERIA 1+ 01/14/2023 01:55 PM    RBCUA 11-20 01/14/2023 01:55 PM    BLOODU LARGE 01/14/2023 01:55 PM    SPECGRAV >=1.030 01/14/2023 01:55 PM    GLUCOSEU Negative 01/14/2023 01:55 PM           No orders to display           ASSESSMENT:      Active Hospital Problems    Diagnosis Date Noted    Cancer [facility]) [C80.1] 01/20/2023     Priority: Medium     Assessement    Terminal ovarian cancer with peritoneal metastasis  Malignant Ascites due to ovarian peritoneal carcinomatosis  Intractable abdominal pain ,nausea and vomiting   Possible septic shock -source unclear  NICO  Acute hypoxic respiratory failure   Possible GI bleed  Acute toxic encephalopathy      Plan:  Discussed goals of by me ,oncology and palliative care with patient,s POA who decided on comfort care and transition to inpatient hospice. Discontinue IVF,IV antibiotics and pressors. Continue oxygen for comfort   Will order as needed lorazepam for anxiety and liquid morphine solution for pain   Nursing care   She can eat for pleasure   No finger stick or IV blood draw  Code Status: DNR-CC    Dispo - prognosis is grim and likely in days unfortunately.         Bg Tam MD

## 2023-01-20 NOTE — PLAN OF CARE
Problem: Gastrointestinal - Adult  Goal: Minimal or absence of nausea and vomiting  Outcome: Progressing  Flowsheets (Taken 1/19/2023 0800)  Minimal or absence of nausea and vomiting:   Administer IV fluids as ordered to ensure adequate hydration   Maintain NPO status until nausea and vomiting are resolved   Administer ordered antiemetic medications as needed   Provide nonpharmacologic comfort measures as appropriate  Note: Patient c/o intermittent nausea. PRN compazine administered per [facility], patient reports improvement in nausea after medicine     Problem: Safety - Adult  Goal: Free from fall injury  Outcome: Progressing  Flowsheets (Taken 1/19/2023 0800)  Free From Fall Injury: Instruct family/caregiver on patient safety  Note: Pt is a High fall risk. See Jolan Robe Fall Score and ABCDS Injury Risk assessments. Explained fall risk precautions to pt and family and rationale behind their use to keep the patient safe. Pt bed is in low position, side rails up, call light and belongings are in reach. Fall wristband applied and present on pts wrist.  Bed alarm on. Pt encouraged to call for assistance. Will continue with hourly rounds for PO intake, pain needs, toileting and repositioning as needed. Problem: Pain  Goal: Verbalizes/displays adequate comfort level or baseline comfort level  Outcome: Progressing  Flowsheets (Taken 1/17/2023 0256 by Francie Barros RN)  Verbalizes/displays adequate comfort level or baseline comfort level:   Encourage patient to monitor pain and request assistance   Assess pain using appropriate pain scale   Administer analgesics based on type and severity of pain and evaluate response   Implement non-pharmacological measures as appropriate and evaluate response  Note: Patient c/o abdominal pain. PRN dilaudid given per MAR. Patient resting with eyes closed on reassessment. Problem: Skin/Tissue Integrity  Goal: Absence of new skin breakdown  Description: 1.   Monitor for areas of redness and/or skin breakdown  2. Assess vascular access sites hourly  3. Every 4-6 hours minimum:  Change oxygen saturation probe site  4. Every 4-6 hours:  If on nasal continuous positive airway pressure, respiratory therapy assess nares and determine need for appliance change or resting period. Outcome: Progressing  Note: Patient with blanchable erythema on coccyx. Sacral heart placed for prevention of skin breakdown. Patient educated on reasoning for offloading and repositioning while in bed.

## 2023-01-20 NOTE — PROGRESS NOTES
Palliative Medicine Progress Note    Admit Date: 1/14/2023  Hospital Day:  Hospital Day: 7     CC: N/V  HPI: [facility] of recently diagnosed ovarian neoplasm with peritoneal, omental metastases with ascites who presented with intractable nausea and vomiting. She's admitted with intractable nausea and vomiting secondary to peritoneal carcinomatosis with mild ileus on KUB. She was started on carboplatin/taxol 1/18. Last night, she developed tachycardia to 140s and hypotension to SBP 60s, and was started on levophed. Recommendations:   [facility] Arnaud Joel and Ike MOCK. We gave Khris Iqbal an update on pt's condition and answered her questions. Khris Rasheed says the pt has always told her she doesn't want to go to extraordinary lengths to stay alive, and that maintaining an independent lifestyle is extremely important to Judith. Khris Iqbal feels confident that the pt would not want to be intubated. At the end of the meeting, the pt's blood pressure dropped and HR increased, and there was discussion about whether to increase pressor support. Khris Iqbal says we should not escalate care further, based on Judith's previously expressed wishes. Khris Iqbal would like to transition to comfort care, but keep her pressor at the current rate for now, at least until other friends have arrived. The pt is awake and talking, says she wants to defer all decision making conversations to Khris Iqbal. She seems to understand that she is getting close to the end of life in her conversation with Khris Iqbal. She requests water and Sprite, which I provided. Offered a  visit, to which the pt agreed. D/w pulm, nephro, oncology, RN Radha Luevano, spiritual care, and AMINAH Bernstein. Addendum: Friends and a cousin are taking turns visiting with the pt, who remains awake, talking, and comfortable, yet hypotensive 70s/30s. Spoke with Galina Uriostegui in the hallway and offered a hospice referral for [facility] since the pt is not stable for transport.  Khris Iqbal prefers [facility] [address] [place] [address]. We spoke about stopping the levophed at some point. Michelle Jones would like to continue the levo for now since she is still visiting with family/friends. D/w AMINAH Asher. 1. Goals of Care/Advanced Care planning/Code status: changed to [facility] per pt's wishes for comfort. Pt/POA want to focus on comfort. Her pressor is being continued for now, while pt's other friends come to the bedside. 2. Pain: pt is moaning softly at times but denies pain. She has had significant abdominal pain earlier this admission, likely from bowel obstruction/partial obstruction and ovarian cancer with peritoneal mets and ascites. 3. SOB: denies sob but appears mildly labored on 4 L oxygen. 4. Nausea/vomiting: denies nausea at this time. Continue phenergan prn.  5. Disposition: TBD     Subjective:     Scheduled Meds:   calcium carbonate  500 mg Oral Once    iron sucrose  300 mg IntraVENous Once    metroNIDAZOLE  500 mg IntraVENous Q8H    meropenem  1,000 mg IntraVENous Q12H    pantoprazole  40 mg IntraVENous Daily    thiamine  100 mg IntraVENous Daily    bisacodyl  5 mg Oral Daily    acetaminophen  1,000 mg Oral Once    polyethylene glycol  17 g Oral Daily    sodium chloride flush  5-40 mL IntraVENous 2 times per day    [Held by provider] enoxaparin  40 mg SubCUTAneous Daily       Continuous Infusions:   norepinephrine 9 mcg/min (01/20/23 1006)    dextrose      IV infusion builder 125 mL/hr at 01/20/23 0654    sodium chloride 5 mL/hr at 01/14/23 2019       PRN Meds:glucose, dextrose bolus **OR** dextrose bolus, glucagon (rDNA), dextrose, prochlorperazine, HYDROmorphone, oxyCODONE, sodium chloride flush, sodium chloride, polyethylene glycol, acetaminophen **OR** acetaminophen    Review of Systems -   Review of Systems   Constitutional:  Positive for fatigue. HENT: Negative. Respiratory: Negative. Cardiovascular: Negative. Gastrointestinal:  Positive for abdominal pain and nausea. Genitourinary: Negative. Musculoskeletal: Negative. Neurological:  Positive for weakness. Psychiatric/Behavioral: Negative. Performance status 20%    Objective:     Patient Vitals for the past 8 hrs:   BP Temp Temp src Pulse Resp SpO2 Weight   01/20/23 1000 (!) 78/61 -- -- (!) 147 23 96 % --   01/20/23 0925 87/66 -- -- (!) 141 18 95 % --   01/20/23 0915 (!) 82/55 -- -- (!) 137 19 95 % --   01/20/23 0905 (!) 74/52 -- -- (!) 156 18 95 % --   01/20/23 0900 (!) 97/49 -- -- (!) 120 16 94 % --   01/20/23 0800 (!) 96/58 98.4 °F (36.9 °C) Oral (!) 110 16 94 % --   01/20/23 0756 -- -- -- -- -- -- 233 lb 11 oz (106 kg)   01/20/23 0606 96/61 -- -- -- -- -- --   01/20/23 0600 121/71 99.9 °F (37.7 °C) CORE (!) 104 21 96 % --   01/20/23 0500 (!) 90/58 99.5 °F (37.5 °C) CORE (!) 115 23 95 % --   01/20/23 0440 (!) 70/47 -- -- (!) 143 21 96 % --   01/20/23 0435 (!) 71/52 -- -- (!) 142 24 98 % --   01/20/23 0430 (!) 71/40 -- -- (!) 140 21 -- --   01/20/23 0400 (!) 71/32 99.3 °F (37.4 °C) CORE (!) 137 16 95 % --     I/O last 3 completed shifts: In: 6150.7 [P.O.:50; I.V.:5900.7; IV Piggyback:200]  Out: 825 [Urine:825]  No intake/output data recorded. Physical Exam  Constitutional:       General: She is not in acute distress. HENT:      Head: Normocephalic and atraumatic. Cardiovascular:      Rate and Rhythm: Regular rhythm. Tachycardia present. Pulmonary:      Effort: Pulmonary effort is normal.      Breath sounds: Normal breath sounds. Abdominal:      General: There is distension. Palpations: Abdomen is soft. Musculoskeletal:         General: generalized swelling. Skin:     General: Skin is warm and dry. Neurological:      Mental Status: She is lethargic but awakens easily.       WBC/Hgb/Hct/Plts:  22.9/9.9/35.4/448 (01/19 0350)           Assessment:     Principal Problem:    Nausea and vomiting  Active Problems:    Abdominal carcinomatosis (HCC)    NICO (acute kidney injury) (HCC)    Hypoxemia    [facility]    Malignant neoplasm of ovary (HCC)    Hyperkalemia    Acidosis  Resolved Problems:    * No resolved hospital problems. *    Pt [phone], Family [phone]  Time spent with patient and/or family: 61 min  Time reviewing records: 5  Time communicating with providers: 10    A total of 75 minutes spent with the patient and family on unit greater than 50% face to face time in counseling regarding palliative care and goals of care for the patient.      Ed Zepeda NP  [address] [place] [address]

## 2023-01-20 NOTE — PROGRESS NOTES
Patient with transfer order to upgrade level of care to ICU. RN spoke to attending Dr. Yajaira Raza to place critical care consult so that patient has orders to reflect ICU status. Order placed, ICU resident at bedside will discuss patient with Dr. Steph Thayer. Patient's vital signs stable. Denies pain at this time. Monitoring strict I&Os. POA Mable at bedside and wanting to discuss decision making with palliative care. Enedelia PEÑA from palliative care called and will be at bedside.

## 2023-01-20 NOTE — PROGRESS NOTES
General Surgery   Daily Progress Note  Patient: Divina Gruber      CC: SBO    SUBJECTIVE:   Patient had a rapid response called on her this AM for tachycardia up to 140s and hypotension with SBP in the 60s. EKG showed sinus tachycardia with PACs and a repeat EKG to rule out SVT. 1L NC bolus and calcium gluconate was given with improvement in HR and SBP. This morning, patient reports that her abdominal pain is stable. She denies any nausea, vomiting, passing gas or bowel movements. ROS:   A 14 point review of systems was conducted, significant findings as noted above. All other systems negative. OBJECTIVE:    PHYSICAL EXAM:    Vitals:    01/20/23 0000 01/20/23 0200 01/20/23 0400 01/20/23 0500   BP: 98/71 102/67 (!) 71/32 (!) 90/58   Pulse: (!) 106 (!) 104 (!) 137 (!) 115   Resp: 19 19 16 23   Temp: 99 °F (37.2 °C) 99 °F (37.2 °C) 99.3 °F (37.4 °C) 99.5 °F (37.5 °C)   TempSrc: Core Core Core Core   SpO2: 97% 96% 95% 95%   Weight:       Height:         General appearance: Alert, no acute distress, grooming appropriate  Eyes: No scleral icterus, EOM grossly intact  Neck: Trachea midline, no JVD, no lymphadenopathy, neck supple  Chest/Lungs: Normal effort with no accessory muscle use on 3L NC  Cardiovascular: Tachycardic, well-perfused  Abdomen: Soft, mildly distended, obese, mild tenderness, no rebound, guarding, or rigidity; non-peritoneal  Skin: Warm and dry, no rashes  Extremities: No edema, no cyanosis  Genitourinary:  Berger in place with clear yellow urine  Neuro: A&Ox3, no focal deficits, sensation intact    LABS:   Recent Labs     01/19/23  0204 01/19/23  0350   WBC 25.4* 22.9*   HGB 10.2* 9.9*   HCT 35.7* 35.4*   MCV 76.3* 76.7*   * 448        Recent Labs     01/19/23  1105 01/20/23  0347    145   K 4.5 4.8   * 113*   CO2 16* 19*   PHOS 4.0 4.8   BUN 73* 81*   CREATININE 2.2* 2.1*        Recent Labs     01/17/23  1042   AST 15   ALT 9*   BILIDIR <0.2   BILITOT <0.2   ALKPHOS 111 No results for input(s): LIPASE, AMYLASE in the last 72 hours. Recent Labs     01/17/23  1042 01/17/23  1226   PROT 5.8*  --    INR  --  1.20*        Recent Labs     01/20/23  0347   TROPONINI <0.01         ASSESSMENT & PLAN:   This is a 57 y.o. female with Hx of newly diagnosed ovarian neoplasm with peritoneal, omental metastases and ascites with complaints of intractable nausea and vomiting.  General surgery was consulted for possible SBO.    - Will hold off gastrograffin challenge today  - Continue NPO, IVF  - NGT placement if patient has more episodes of vomiting  - Follow-up EGD per GI  - Rest of care per primary team      Sarah Flores DO, [facility]  PGY1, General Surgery  01/20/23  5:46 AM  [employer]  Pager: [phone]

## 2023-01-20 NOTE — PROGRESS NOTES
[facility]:  Met with the pt's VINCENZO Akins; Pt appeared to be listening to conversation but did not speak. Placitas Means states pt has become less responsive that she was earlier in the day. Aramis Means signed the hospice consent due to pt. Weakness. Pt will stay at the hospital under Hospice care as she is too unstable to be transported. BP 70/39 while on Levophed. Skin cold to the touch. Plan is to stop the Levophed this evening once other visitors see the patient. RN and SW are aware of the plan. Please call [facility] 24 hours as day with questions, concerns or when the pt passes away. [phone]. Thank you for caring for this hospice patient.      Griselda Keepers RN [facility]

## 2023-01-21 NOTE — DISCHARGE SUMMARY
Hospital Medicine Discharge Summary    Patient ID: Fabrizio Elam      Patient's PCP: Pcp No    Admit Date: 1/14/2023     Discharge Date: 1/20/2023      Admitting Provider: Geoffrey Nolen MD     Discharge Provider: Jane Costello MD     Discharge Diagnoses:    Ovarian cancer with peritoneal carcinomatosis ovarian cancer with peritoneal carcinomatosis ovarian carcinomatosis. Sepsis with unclear etiology. She was in shock requiring IV fluid, pressor and and antibiotic  Acute toxic encephalopathy  Acute hypoxic respiratory failure  Severe protein calorie malnutrition  Persistent nausea and vomiting likely due to peritoneal carcinomatosis  Possible GI bleed  Acute kidney injury       Active Hospital Problems    Diagnosis     Encounter for palliative care [Z51.5]      Priority: Medium    Goals of care, counseling/discussion [Z71.89]      Priority: Medium    Abdominal carcinomatosis ([address] [C76.2]      Priority: Medium    NICO (acute kidney injury) ([address] [N17.9]      Priority: Medium    Hypoxemia [R09.02]      Priority: Medium    Partial small bowel obstruction ([address] [K56.600]      Priority: Medium    Malignant neoplasm of ovary ([address] [C56.9]      Priority: Medium    Hyperkalemia [E87.5]      Priority: Medium    Acidosis [E87.20]      Priority: Medium    Nausea and vomiting [R11.2]      Priority: Medium       The patient was seen and examined on day of discharge and this discharge summary is in conjunction with any daily progress note from day of discharge. Hospital Course: This is 75-year-old female with medical history significant for metastatic ovarian cancer with peritoneal carcinomatosis, malignant ascites and bilateral pleural effusion who was admitted with persistent nausea vomiting and abdominal pain likely due to peritoneal carcinomatosis, sepsis with unclear etiology, possible GI bleed, acute kidney injury and respiratory failure. She was not responding to IV fluid, pressors and IV antibiotic. Palliative care were assisting in the goals of care discussion with her POA who decided on comfort care and no aggressive intervention including intubation. She was transition from acute care to inpatient hospice. Physical Exam Performed:     BP (!) 70/39   Pulse (!) 152   Temp 98 °F (36.7 °C) (Axillary)   Resp (!) 32   Ht 5' 3\" (1.6 m)   Wt 233 lb 11 oz (106 kg)   SpO2 96%   BMI 41.40 kg/m²      General appearance: Sick looking, lethargic  HEENT: Pupils equal, round, and reactive to light. Conjunctivae/corneas clear. Neck: Distended right neck when  Respiratory: Tachypneic with decreased air entry at the bases  Cardiovascular: Tachycardiac  Abdomen: Soft, non-tender, non-distended with normal bowel sounds. Musculoskeletal: Moves all 4 extremities  Skin: Pale looking. Neurologic: Opens eyes to verbal command but lethargic    Labs: For convenience and continuity at follow-up the following most recent labs are provided:      CBC:    Lab Results   Component Value Date/Time    WBC 22.9 01/19/2023 03:50 AM    HGB 9.9 01/19/2023 03:50 AM    HCT 35.4 01/19/2023 03:50 AM     01/19/2023 03:50 AM       Renal:    Lab Results   Component Value Date/Time     01/20/2023 03:47 AM    K 4.8 01/20/2023 03:47 AM    K 4.7 01/15/2023 04:45 AM     01/20/2023 03:47 AM    CO2 19 01/20/2023 03:47 AM    BUN 81 01/20/2023 03:47 AM    CREATININE 2.1 01/20/2023 03:47 AM    CALCIUM 6.4 01/20/2023 03:47 AM    PHOS 4.8 01/20/2023 03:47 AM         Significant Diagnostic Studies    Radiology:   XR CHEST PORTABLE   Final Result   Impression: Improving but persistent bibasilar pleural and parenchymal opacities. XR ABDOMEN (KUB) (SINGLE AP VIEW)   Final Result   1. Persistent small bowel distention compatible with partial small bowel obstruction. XR CHEST PORTABLE   Final Result      Increased pulmonary edema and increased moderate right greater than left pleural effusions.       XR ABDOMEN (KUB) (SINGLE AP VIEW)   Final Result   Impression:   Suspected small bowel obstruction, similar in appearance compared to prior. US GUIDED PARACENTESIS   Final Result   1. Successful ultrasound-guided paracentesis without complication. CT ABDOMEN PELVIS WO CONTRAST Additional Contrast? Oral   Final Result   1. Diffuse small bowel distention essentially to the ileocecal valve most indicative of ileus. 2. Enlarged pelvic mass again noted. 3. Small to moderate volume ascites. Moderate bilateral pleural effusions. XR CHEST PORTABLE   Final Result   1. Cardiomegaly with probable effusions and basilar airspace disease suggesting pulmonary edema and atelectasis. Superimposed pneumonia is not excluded. XR ABDOMEN (KUB) (SINGLE AP VIEW)   Final Result   1. Nonspecific bowel gas pattern suggestive for mild degree of ileus   2.  At least moderate ostial arthritis right hip      PET INTERPRETATION OF OUTSIDE IMAGES    (Results Pending)          Consults:     IP CONSULT TO HOSPITALIST  IP CONSULT TO ONCOLOGY  IP CONSULT TO DIETITIAN  IP CONSULT TO PALLIATIVE CARE  IP CONSULT TO PHARMACY  IP CONSULT TO GI  IP CONSULT TO CRITICAL CARE  IP CONSULT TO GENERAL SURGERY  IP CONSULT TO HOSPICE  IP CONSULT TO HOSPICE  IP CONSULT TO HOSPITALIST    Disposition: Discharge to inpatient hospice    Condition at Discharge: Terminal        Code Status: DNR/DNI with comfort care  Activity: activity as tolerated        Discharge Medications:     Discharge Medication List as of 1/20/2023  4:19 PM             Details   docusate sodium (COLACE) 100 MG capsule Take 1 capsule by mouth 2 times daily as needed for Constipation, Disp-60 capsule, R-0Normal      ondansetron (ZOFRAN-ODT) 4 MG disintegrating tablet Take 1 tablet by mouth 3 times daily as needed for Nausea or Vomiting, Disp-21 tablet, R-0Normal             Time Spent on discharge: 40 minutes in the examination, evaluation, counseling and review of medications and discharge plan.      Signed:    Leandro Grove MD   1/21/2023      Thank you Pcp No for the opportunity to be involved in this patient's care. If you have any questions or concerns, please feel free to contact me at [phone].

## 2023-01-21 NOTE — SIGNIFICANT EVENT
The ICU resident team was called approximately around [id number] for tachycardia. Patient is a 20-year-old female with PMHx of metastatic ovarian cancer with peritoneal carcinomatosis,chronic intractable nausea,vomiting and abdominal pain due to peritoneal carcinomatosis  was admitted to the hospital with sepsis. On arrival patient was tachycardic in the 140s with SBP in the 80s. On arrival, she was alert and oriented x3 and did not have any complaints. EKG was obtained which showed SVT. Patient later spoke with palliative care and decision was made to change code status to [facility] and not wanting any further interventions.

## 2023-01-21 NOTE — DEATH NOTES
Death Pronouncement Note  Patient's Name: Mary Kate Gomez   Patient's YOB: 1960  MRN Number: [id number]    Admitting Provider: Brent Mcclain MD  Attending Provider: Brent Mcclain MD    Patient was examined and the following were absent: Pulses, Blood Pressure, and Respiratory effort    I declared the patient dead on  1/20/2023 at 19:50.      Preliminary Cause of Death:   Cardiopulmonary arrest due to metastatic ovarian cancer    Electronically signed by Gregory Gonzalez MD on 1/20/23 at 9:16 PM EST

## 2023-01-21 NOTE — PROGRESS NOTES
Body released to [facility]. Pt accompany by  home rep and . See Post-[facility] flow sheet for more information.

## 2023-01-23 LAB
BLOOD CULTURE, ROUTINE: NORMAL
CULTURE, BLOOD 2: NORMAL